# Patient Record
Sex: FEMALE | Race: WHITE | Employment: UNEMPLOYED | URBAN - METROPOLITAN AREA
[De-identification: names, ages, dates, MRNs, and addresses within clinical notes are randomized per-mention and may not be internally consistent; named-entity substitution may affect disease eponyms.]

---

## 2018-06-28 PROBLEM — N31.9 NEUROGENIC BLADDER: Status: ACTIVE | Noted: 2018-06-28

## 2018-06-28 PROBLEM — K31.84 GASTROPARESIS: Status: ACTIVE | Noted: 2018-06-28

## 2018-06-28 PROBLEM — Z87.440 HISTORY OF UTI: Status: ACTIVE | Noted: 2018-06-28

## 2018-06-28 PROBLEM — Q05.4 SPINA BIFIDA WITH HYDROCEPHALUS (HCC): Status: ACTIVE | Noted: 2018-06-28

## 2018-07-03 PROBLEM — Z78.9 SELF-CATHETERIZES URINARY BLADDER: Status: ACTIVE | Noted: 2018-07-03

## 2018-07-03 PROBLEM — N39.41 URGE INCONTINENCE: Status: ACTIVE | Noted: 2018-07-03

## 2018-07-03 PROBLEM — N39.0 RECURRENT UTI: Status: ACTIVE | Noted: 2018-07-03

## 2021-06-04 ENCOUNTER — OFFICE VISIT (OUTPATIENT)
Dept: ORTHOPEDIC SURGERY | Age: 56
End: 2021-06-04
Payer: MEDICARE

## 2021-06-04 VITALS — BODY MASS INDEX: 21.13 KG/M2 | HEIGHT: 65 IN

## 2021-06-04 DIAGNOSIS — Z96.642 HISTORY OF TOTAL LEFT HIP REPLACEMENT: ICD-10-CM

## 2021-06-04 DIAGNOSIS — M25.551 RIGHT HIP PAIN: Primary | ICD-10-CM

## 2021-06-04 DIAGNOSIS — M81.0 AGE-RELATED OSTEOPOROSIS WITHOUT CURRENT PATHOLOGICAL FRACTURE: ICD-10-CM

## 2021-06-04 PROCEDURE — 99214 OFFICE O/P EST MOD 30 MIN: CPT | Performed by: ORTHOPAEDIC SURGERY

## 2021-06-04 PROCEDURE — 3017F COLORECTAL CA SCREEN DOC REV: CPT | Performed by: ORTHOPAEDIC SURGERY

## 2021-06-04 PROCEDURE — 73502 X-RAY EXAM HIP UNI 2-3 VIEWS: CPT | Performed by: ORTHOPAEDIC SURGERY

## 2021-06-04 PROCEDURE — G8420 CALC BMI NORM PARAMETERS: HCPCS | Performed by: ORTHOPAEDIC SURGERY

## 2021-06-04 PROCEDURE — G8432 DEP SCR NOT DOC, RNG: HCPCS | Performed by: ORTHOPAEDIC SURGERY

## 2021-06-04 PROCEDURE — G8427 DOCREV CUR MEDS BY ELIG CLIN: HCPCS | Performed by: ORTHOPAEDIC SURGERY

## 2021-06-04 NOTE — PROGRESS NOTES
Patient: Wale Bobo                MRN: [id number]       SSN: [id number]  YOB: 1965        AGE: 54 y.o. SEX: female  Body mass index is 21.13 kg/m². PCP: Yvon Acosta MD  21    Ami Vasquez presents today with right-sided low back and right hip pain they have been  many specialists including Dr. Jenifer Marie and they are frustrated because of having trouble pinpointing the location of her pain that is aching she has spina bifida and stroke on the left side does a fair bit of time sitting and has had a bone scan which really did not show much had an MRI of the hip which did not show too much in an MRI of the back was thought of is nonsurgical and they report some cracking and crepitus at from time to time no fevers or chills no weight issues and. Left hip rotates normally she is had a hip replacement the right hip she has relatively free and easy range of motion but with internal rotation she gets pain that is in the abductor area the bursal area of the trochanter is nontender skin is normal the low back is only mildly tender again at over the ileocrest and its in the area of the abductors.     And the feet are noncontributory today    We did do x-rays 2021 AP pelvis AP lateral right hip shows just minimal degenerative changes the left hip is incompletely visualized which shows that she is had a greater trochanteric fracture and heterotopic ossification and quite significant osteoporosis as well    Bone scan did show a lot the only other thing I can think of is a labral tear but with atypical pain and symptoms we can do an MRI arthrogram of the hip there was a discussion regarding surgery I do not have a good surgical indication for her I do not doubt that she is having pain and I would recommend some physical therapy for her for her and therefore there especially with the pain in the abductor area they are going to see me back in a few weeks after the MRI arthrogram but is a pleasure to share in her care and she is a little bit difficult to diagnose just from the nature of the pain in the location thank you    REVIEW OF SYSTEMS:      CON: negative  EYE: negative   ENT: negative  RESP: negative  GI:    negative   :  negative  MSK: Positive  A twelve point review of systems was completed, positives noted and all other systems were reviewed and are negative          Past Medical History:   Diagnosis Date    Arthritis     Cerebral [facility] occlusion [facility] infarction [address] [address]     Constipation     Frequent UTI     Ill-defined condition     hydrocephalus    Ill-defined condition     tremors of right hand    Pituitary abnormality (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 No pertinent family history. Current Outpatient Medications   Medication Sig Dispense Refill    trospium (SANCTURA) 20 mg tablet       nitrofurantoin, macrocrystal-monohydrate, (MACROBID) 100 mg capsule TAKE ONE CAPSULE BY MOUTH EVERY NIGHT 30 Cap 5    omeprazole (PRILOSEC) 40 mg capsule       traZODone (DESYREL) 50 mg tablet       diclofenac (VOLTAREN) 1 % gel       acetaminophen (TYLENOL) 325 mg tablet Take 500 mg by mouth every four (4) hours as needed for Pain.  lamoTRIgine (LAMICTAL) 150 mg tablet Take 150 mg by mouth two (2) times a day. Indications: DEPRESSION ASSOCIATED WITH BIPOLAR DISORDER      bromocriptine (PARLODEL) 2.5 mg tablet Take 2.5 mg by mouth daily (after dinner).  gabapentin (NEURONTIN) 300 mg capsule Take 300 mg by mouth daily.  gabapentin (NEURONTIN) 600 mg tablet Take 1,800 mg by mouth nightly.  citalopram (CELEXA) 20 mg tablet Take 20 mg by mouth daily (with dinner).  cholecalciferol (VITAMIN D3) 1,000 unit tablet Take 1,000 Units by mouth daily.       cyanocobalamin (VITAMIN B-12) 1,000 mcg tablet Take 1,000 mcg by mouth daily.  ascorbic acid, vitamin C, (VITAMIN C) 500 mg tablet Take 500 mg by mouth daily.  ALPRAZolam (XANAX) 0.5 mg tablet  (Patient not taking: Reported on 6/4/2021)         Allergies   Allergen Reactions    Bupropion Hcl Seizures and Itching    Iodinated Contrast Media Unknown (comments)     As a child    Tramadol     Vicodin [Hydrocodone-Acetaminophen] Other (comments)       Past Surgical History:   Procedure Laterality Date    HX DILATION AND CURETTAGE      HX HIP REPLACEMENT      left     HX LAP CHOLECYSTECTOMY      HX ORTHOPAEDIC Left 2007    hip replacement    HX ORTHOPAEDIC Left 2011    hip revision    VASCULAR SURGERY PROCEDURE UNLIST      multiple shut surgerys       Social History     Socioeconomic History    Marital status:      Spouse name: Not on file    Number of children: Not on file    Years of education: Not on file    Highest education level: Not on file   Occupational History    Not on file   Tobacco Use    Smoking status: Never Smoker    Smokeless tobacco: Never Used   Substance and Sexual Activity    Alcohol use: No    Drug use: No    Sexual activity: Not on file   Other Topics Concern    Not on file   Social History Narrative    Not on file     Social Determinants of Health     Financial Resource Strain:     Difficulty of Paying Living Expenses:    Food Insecurity:     Worried About Running Out of Food in the Last Year:     Ran Out of Food in the Last Year:    Transportation Needs:     Lack of Transportation (Medical):      Lack of Transportation (Non-Medical):    Physical Activity:     Days of Exercise per Week:     Minutes of Exercise per Session:    Stress:     Feeling of Stress :    Social Connections:     Frequency of Communication with Friends and Family:     Frequency of Social Gatherings with Friends and Family:     Attends Hinduism Services:     Active Member of Clubs or Organizations:     Attends Club or Organization Meetings:     Marital Status:    Intimate Partner Violence:     Fear of Current or Ex-Partner:     Emotionally Abused:     Physically Abused:     Sexually Abused:        Visit Vitals  Ht 5' 5\" (1.651 m)   BMI 21.13 kg/m²         PHYSICAL EXAMINATION:  GENERAL: Alert and oriented x3, in no acute distress, well-developed, well-nourished, afebrile. HEART: No JVD. EYES: No scleral icterus   NECK: No significant lymphadenopathy   LUNGS: No respiratory compromise or indrawing  ABDOMEN: Soft, non-tender, non-distended. Electronically signed by:  Candice Enciso MD

## 2021-07-16 ENCOUNTER — OFFICE VISIT (OUTPATIENT)
Dept: ORTHOPEDIC SURGERY | Age: 56
End: 2021-07-16
Payer: MEDICARE

## 2021-07-16 VITALS — TEMPERATURE: 97.8 F | HEART RATE: 81 BPM | OXYGEN SATURATION: 97 %

## 2021-07-16 DIAGNOSIS — M70.61 GREATER TROCHANTERIC BURSITIS OF RIGHT HIP: ICD-10-CM

## 2021-07-16 DIAGNOSIS — M25.551 RIGHT HIP PAIN: Primary | ICD-10-CM

## 2021-07-16 PROCEDURE — G8427 DOCREV CUR MEDS BY ELIG CLIN: HCPCS | Performed by: ORTHOPAEDIC SURGERY

## 2021-07-16 PROCEDURE — 20610 DRAIN/INJ JOINT/BURSA W/O US: CPT | Performed by: ORTHOPAEDIC SURGERY

## 2021-07-16 PROCEDURE — G8432 DEP SCR NOT DOC, RNG: HCPCS | Performed by: ORTHOPAEDIC SURGERY

## 2021-07-16 PROCEDURE — 99214 OFFICE O/P EST MOD 30 MIN: CPT | Performed by: ORTHOPAEDIC SURGERY

## 2021-07-16 PROCEDURE — 3017F COLORECTAL CA SCREEN DOC REV: CPT | Performed by: ORTHOPAEDIC SURGERY

## 2021-07-16 PROCEDURE — G8420 CALC BMI NORM PARAMETERS: HCPCS | Performed by: ORTHOPAEDIC SURGERY

## 2021-07-16 RX ORDER — BETAMETHASONE SODIUM PHOSPHATE AND BETAMETHASONE ACETATE 3; 3 MG/ML; MG/ML
6 INJECTION, SUSPENSION INTRA-ARTICULAR; INTRALESIONAL; INTRAMUSCULAR; SOFT TISSUE ONCE
Status: COMPLETED | OUTPATIENT
Start: 2021-07-16 | End: 2021-07-16

## 2021-07-16 RX ADMIN — BETAMETHASONE SODIUM PHOSPHATE AND BETAMETHASONE ACETATE 6 MG: 3; 3 INJECTION, SUSPENSION INTRA-ARTICULAR; INTRALESIONAL; INTRAMUSCULAR; SOFT TISSUE at 16:20

## 2021-07-16 NOTE — PROGRESS NOTES
Patient: Nisha Balbuena                MRN: [id number]       SSN: [id number]  YOB: 1965        AGE: 54 y.o. SEX: female  There is no height or weight on file to calculate BMI.     PCP: Diandra Leach MD  07/16/21    Returns in follow-up with regards to right-sided hip pain I did order an MRI arthrogram because she was having groin discomfort not diagnosed on bone scan or plain MRI she canceled it and she had some questions about it she still is having some groin discomfort and night I cannot put a needle into the groin space in the office the exam today shows that she is got quite severe bursitis of the right hip her significant other would like her to have an injection and she is agreeable she had one on the left side of remotely    Examination today flexion adduction internal rotation recreate reproduces some groin discomfort although the motion is fairly reasonable and without a lot of discomfort and she is tender over the trochanter significantly    Previous x-rays do show mild arthritis involving the hip and the MRI does not really show a lot on plain technetium bone scan did not show much either    I recommend MRI arthrogram hopefully it is negative and under aseptic additions with timeout the right trochanteric bursa injected as per protocol thank you    REVIEW OF SYSTEMS:      CON: negative  EYE: negative   ENT: negative  RESP: negative  GI:    negative   :  negative  MSK: Positive  A twelve point review of systems was completed, positives noted and all other systems were reviewed and are negative          Past Medical History:   Diagnosis Date    Arthritis     [facility]erebral [facility] occlusion [facility] infarction [facility] [address]     Constipation     Frequent UTI     Ill-defined condition     hydrocephalus    Ill-defined condition     tremors of right hand    Pituitary abnormality ([address]     Pituitary mass ([address]     benign    Psychiatric disorder     depression    Right hip pain  Self-catheterizes urinary bladder     Stroke [facility]) 1972    left side paralysis       History reviewed. No pertinent family history. Current Outpatient Medications   Medication Sig Dispense Refill    trospium (SANCTURA) 20 mg tablet       nitrofurantoin, macrocrystal-monohydrate, (MACROBID) 100 mg capsule TAKE ONE CAPSULE BY MOUTH EVERY NIGHT 30 Cap 5    omeprazole (PRILOSEC) 40 mg capsule       traZODone (DESYREL) 50 mg tablet       diclofenac (VOLTAREN) 1 % gel       acetaminophen (TYLENOL) 325 mg tablet Take 500 mg by mouth every four (4) hours as needed for Pain.  lamoTRIgine (LAMICTAL) 150 mg tablet Take 150 mg by mouth two (2) times a day. Indications: DEPRESSION ASSOCIATED WITH BIPOLAR DISORDER      bromocriptine (PARLODEL) 2.5 mg tablet Take 2.5 mg by mouth daily (after dinner).  gabapentin (NEURONTIN) 300 mg capsule Take 300 mg by mouth daily.  gabapentin (NEURONTIN) 600 mg tablet Take 1,800 mg by mouth nightly.  citalopram (CELEXA) 20 mg tablet Take 20 mg by mouth daily (with dinner).  cholecalciferol (VITAMIN D3) 1,000 unit tablet Take 1,000 Units by mouth daily.  cyanocobalamin (VITAMIN B-12) 1,000 mcg tablet Take 1,000 mcg by mouth daily.  ascorbic acid, vitamin C, (VITAMIN C) 500 mg tablet Take 500 mg by mouth daily.       ALPRAZolam (XANAX) 0.5 mg tablet  (Patient not taking: Reported on 6/4/2021)         Allergies   Allergen Reactions    Bupropion Hcl Seizures and Itching    Iodinated Contrast Media Unknown (comments)     As a child    Tramadol     Vicodin [Hydrocodone-Acetaminophen] Other (comments)       Past Surgical History:   Procedure Laterality Date    HX DILATION AND CURETTAGE      HX HIP REPLACEMENT      left     HX LAP CHOLECYSTECTOMY      HX ORTHOPAEDIC Left 2007    hip replacement    HX ORTHOPAEDIC Left 2011    hip revision    VASCULAR SURGERY PROCEDURE UNLIST      multiple shut surgerys       Social History     Socioeconomic History    Marital status:      Spouse name: Not on file    Number of children: Not on file    Years of education: Not on file    Highest education level: Not on file   Occupational History    Not on file   Tobacco Use    Smoking status: Never Smoker    Smokeless tobacco: Never Used   Substance and Sexual Activity    Alcohol use: No    Drug use: No    Sexual activity: Not on file   Other Topics Concern    Not on file   Social History Narrative    Not on file     Social Determinants of Health     Financial Resource Strain:     Difficulty of Paying Living Expenses:    Food Insecurity:     Worried About Running Out of Food in the Last Year:     [address] in the Last Year:    Transportation Needs:     Lack of Transportation (Medical):  Lack of Transportation (Non-Medical):    Physical Activity:     Days of Exercise per Week:     Minutes of Exercise per Session:    Stress:     Feeling of Stress :    Social Connections:     Frequency of Communication with Friends and Family:     Frequency of Social Gatherings with Friends and Family:     Attends Yazidism Services:     Active Member of Clubs or Organizations:     Attends Club or Organization Meetings:     Marital Status:    Intimate Partner Violence:     Fear of Current or Ex-Partner:     Emotionally Abused:     Physically Abused:     Sexually Abused:        Visit Vitals  Pulse 81   Temp 97.8 °F (36.6 °C) (Skin)   SpO2 97%         PHYSICAL EXAMINATION:  GENERAL: Alert and oriented x3, in no acute distress, well-developed, well-nourished, afebrile. HEART: No JVD. EYES: No scleral icterus   NECK: No significant lymphadenopathy   LUNGS: No respiratory compromise or indrawing  ABDOMEN: Soft, non-tender, non-distended. Electronically signed by: MD JOEL Castillo, Naomie Street M.D., have reviewed the history, physical, and have updated the allergic reactions for Lala An.     TIME OUT performed immediately prior to the start of procedure:  Akash Higgins M.D., have performed the following reviews on Kristin Dimas prior to the start of the procedure:    - Patient was identified by name and date of birth  - Agreement on procedure being performed was verified  - Risks and benefits explained to the patient  - Patient was positioned for comfort  - Consent was signed and verified  - Patient was advised regarding risks of bruising, bleeding, infection and pain    Time: 4:15 PM     Body Part: intra-bursal injection of right hip. Medication and Dose: 1mL Celestone preparation, i.e. 6 mg, and 3 mL 1% lidocaine    Date of Procedure: 07/16/21    PROCEDURE PERFORMED BY : Larry Marrero M.D., [facility])    Provider Assisted by: Faustina Goodman    Patient assisted by: self    Patient tolerated procedure well with no complications

## 2021-08-31 DIAGNOSIS — M25.551 RIGHT HIP PAIN: ICD-10-CM

## 2021-09-03 ENCOUNTER — TELEPHONE (OUTPATIENT)
Dept: ORTHOPEDIC SURGERY | Age: 56
End: 2021-09-03

## 2021-09-03 NOTE — TELEPHONE ENCOUNTER
Patient called to schedule her MRI follow up appointment - MRI ws done at [facility] 2 weeks ago. She says they did not provide her a disc and results are not showing in system. Are we able to see these results and schedule her follow up? Please advise PAC.

## 2021-09-07 DIAGNOSIS — M25.551 RIGHT HIP PAIN: ICD-10-CM

## 2021-09-07 PROCEDURE — 27093 INJECTION FOR HIP X-RAY: CPT | Performed by: ORTHOPAEDIC SURGERY

## 2021-09-10 ENCOUNTER — OFFICE VISIT (OUTPATIENT)
Dept: ORTHOPEDIC SURGERY | Age: 56
End: 2021-09-10
Payer: MEDICARE

## 2021-09-10 VITALS
TEMPERATURE: 98 F | OXYGEN SATURATION: 98 % | HEIGHT: 65 IN | BODY MASS INDEX: 23.32 KG/M2 | HEART RATE: 83 BPM | WEIGHT: 140 LBS

## 2021-09-10 DIAGNOSIS — Z96.642 HISTORY OF TOTAL LEFT HIP REPLACEMENT: Primary | ICD-10-CM

## 2021-09-10 DIAGNOSIS — M70.61 GREATER TROCHANTERIC BURSITIS OF RIGHT HIP: ICD-10-CM

## 2021-09-10 DIAGNOSIS — M25.551 RIGHT HIP PAIN: ICD-10-CM

## 2021-09-10 PROCEDURE — 99214 OFFICE O/P EST MOD 30 MIN: CPT | Performed by: PHYSICIAN ASSISTANT

## 2021-09-10 PROCEDURE — G8432 DEP SCR NOT DOC, RNG: HCPCS | Performed by: PHYSICIAN ASSISTANT

## 2021-09-10 PROCEDURE — 3017F COLORECTAL CA SCREEN DOC REV: CPT | Performed by: PHYSICIAN ASSISTANT

## 2021-09-10 PROCEDURE — G8420 CALC BMI NORM PARAMETERS: HCPCS | Performed by: PHYSICIAN ASSISTANT

## 2021-09-10 PROCEDURE — G8427 DOCREV CUR MEDS BY ELIG CLIN: HCPCS | Performed by: PHYSICIAN ASSISTANT

## 2021-09-10 NOTE — PROGRESS NOTES
[address] [place] [address]  [phone]           Patient: Vanessa Duran                MRN: [id number]       SSN: [id number]  YOB: 1965        AGE: 54 y.o. SEX: female  Body mass index is 23.3 kg/m². PCP: Yanira Ramos MD  09/10/21      This office note has been dictated. REVIEW OF SYSTEMS:  Constitutional: Negative for fever, chills, weight loss and malaise/fatigue. HENT: Negative. Eyes: Negative. Respiratory: Negative. Cardiovascular: Negative. Gastrointestinal: No bowel incontinence or constipation. Genitourinary: No bladder incontinence or saddle anesthesia. Skin: Negative. Neurological: Negative. Endo/Heme/Allergies: Negative. Psychiatric/Behavioral: Negative. Musculoskeletal: As per HPI above. Past Medical History:   Diagnosis Date    Arthritis     Cerebral artery occlusion with cerebral infarction ([address]     Constipation     Frequent UTI     Ill-defined condition     hydrocephalus    Ill-defined condition     tremors of right hand    Pituitary abnormality (HCC)     Pituitary mass (HCC)     benign    Psychiatric disorder     depression    Right hip pain     Self-catheterizes urinary bladder     Stroke ([address] 1972    left side paralysis         Current Outpatient Medications:     trospium (SANCTURA) 20 mg tablet, , Disp: , Rfl:     nitrofurantoin, macrocrystal-monohydrate, (MACROBID) 100 mg capsule, TAKE ONE CAPSULE BY MOUTH EVERY NIGHT, Disp: 30 Cap, Rfl: 5    omeprazole (PRILOSEC) 40 mg capsule, , Disp: , Rfl:     traZODone (DESYREL) 50 mg tablet, , Disp: , Rfl:     diclofenac (VOLTAREN) 1 % gel, , Disp: , Rfl:     acetaminophen (TYLENOL) 325 mg tablet, Take 500 mg by mouth every four (4) hours as needed for Pain., Disp: , Rfl:     lamoTRIgine (LAMICTAL) 150 mg tablet, Take 150 mg by mouth two (2) times a day.  Indications: DEPRESSION ASSOCIATED WITH BIPOLAR DISORDER, Disp: , Rfl:     bromocriptine (PARLODEL) 2.5 mg tablet, Take 2.5 mg by mouth daily (after dinner). , Disp: , Rfl:     gabapentin (NEURONTIN) 300 mg capsule, Take 300 mg by mouth daily. , Disp: , Rfl:     gabapentin (NEURONTIN) 600 mg tablet, Take 1,800 mg by mouth nightly., Disp: , Rfl:     citalopram (CELEXA) 20 mg tablet, Take 20 mg by mouth daily (with dinner). , Disp: , Rfl:     cholecalciferol (VITAMIN D3) 1,000 unit tablet, Take 1,000 Units by mouth daily. , Disp: , Rfl:     cyanocobalamin (VITAMIN B-12) 1,000 mcg tablet, Take 1,000 mcg by mouth daily. , Disp: , Rfl:     ascorbic acid, vitamin C, (VITAMIN C) 500 mg tablet, Take 500 mg by mouth daily. , Disp: , Rfl:     ALPRAZolam (XANAX) 0.5 mg tablet, , Disp: , Rfl:     Allergies   Allergen Reactions    Bupropion Hcl Seizures and Itching    Iodinated Contrast Media Unknown (comments)     As a child    Tramadol     Vicodin [Hydrocodone-Acetaminophen] Other (comments)       Social History     Socioeconomic History    Marital status:      Spouse name: Not on file    Number of children: Not on file    Years of education: Not on file    Highest education level: Not on file   Occupational History    Not on file   Tobacco Use    Smoking status: Never Smoker    Smokeless tobacco: Never Used   Substance and Sexual Activity    Alcohol use: No    Drug use: No    Sexual activity: Not on file   Other Topics Concern    Not on file   Social History Narrative    Not on file     Social Determinants of Health     Financial Resource Strain:     Difficulty of Paying Living Expenses:    Food Insecurity:     Worried About Running Out of Food in the Last Year:     Ran Out of Food in the Last Year:    Transportation Needs:     Lack of Transportation (Medical):      Lack of Transportation (Non-Medical):    Physical Activity:     Days of Exercise per Week:     Minutes of Exercise per Session:    Stress:     Feeling of Stress :    Social Connections:     Frequency of Communication with Friends and Family:     Frequency of Social Gatherings with Friends and Family:     Attends Hindu Services:     Active Member of Clubs or Organizations:     Attends Club or Organization Meetings:     Marital Status:    Intimate Partner Violence:     Fear of Current or Ex-Partner:     Emotionally Abused:     Physically Abused:     Sexually Abused:        Past Surgical History:   Procedure Laterality Date    HX DILATION AND CURETTAGE      HX HIP REPLACEMENT      left     HX LAP CHOLECYSTECTOMY      HX ORTHOPAEDIC Left 2007    hip replacement    HX ORTHOPAEDIC Left 2011    hip revision    VASCULAR SURGERY PROCEDURE UNLIST      multiple shut surgerys         Patient seen evaluate today for her right hip. She is seen by Dr. Shamar Garcia at which point she had a cortisone injection for bursitis which did not seem to help her much. She was sent for an arthrogram MRI of her hip to rule out any labral pathology. She has today for review. She does continue to have laterally based discomfort of her right hip. Denies any groin pain or thigh pain. She has no radiating pain down the lower extremity. Patient denies recent fevers, chills, chest pain, SOB, or injuries. No recent systemic changes noted. A 12-point review of systems is performed today. Pertinent positives are noted. All other systems reviewed and otherwise are negative. Physical exam: General: Alert and oriented x3, nad.  well-developed, well nourished. normal affect, AF. NC/AT, EOMI, neck supple, trachea midline, no JVD present. Breathing is non-labored. Examination of lower extremities reveals pretty well-maintained range of motion of the hips. Does have a little laterally based discomfort with rotation. She does have discomfort with patient the trochanter bursa. Negative straight leg raise. Negative calf tenderness. Negative Homans. No signs of DVT present.     Review of MRI right hip:  No evidence for displaced labral tear. There is fluid adjacent to the IT tract to the level of the right greater trochanteric region. Assessment: Right hip trochanter bursitis, IT band syndrome    Plan: At this point, we discussed treatment options. She declines a repeat cortisone injection today. She will continue on her Motrin over-the-counter as directed. We will get her into physical therapy for IT band stretching, iontophoresis, ultrasound. We will see her back in about 6 weeks time for evaluation and consider repeat injection for the hip at that point.             JR Jonathan DECKER, PA-C, ATC

## 2021-09-28 ENCOUNTER — HOSPITAL ENCOUNTER (OUTPATIENT)
Dept: PHYSICAL THERAPY | Age: 56
Discharge: HOME | End: 2021-09-28
Attending: PHYSICIAN ASSISTANT
Payer: MEDICARE

## 2021-09-28 DIAGNOSIS — M70.61 GREATER TROCHANTERIC BURSITIS OF RIGHT HIP: ICD-10-CM

## 2021-09-28 PROCEDURE — 97163 PT EVAL HIGH COMPLEX 45 MIN: CPT

## 2021-09-28 PROCEDURE — 97530 THERAPEUTIC ACTIVITIES: CPT

## 2021-09-28 NOTE — PROGRESS NOTES
PT DAILY TREATMENT NOTE/HIP [id number]    Patient Name: Pamela Fulton  Date:2021  : 1965  [x]  Patient  Verified  Payor: Lida Concepcion / Plan: [address]  HMO / Product Type: Managed Care Medicare /    In time:4:45  Out time:5:20  Total Treatment Time (min): 35  Visit #: 1 of 16    Medicare/BCBS Only   Total Timed Codes (min):  10 1:1 Treatment Time:  35     Treatment Area: Greater trochanteric bursitis of right hip [M70.61]    SUBJECTIVE  Pain Level (0-10 scale): 9  [x]constant []intermittent []improving []worsening []no change since onset    Any medication changes, allergies to medications, adverse drug reactions, diagnosis change, or new procedure performed?: [x] No    [] Yes (see summary sheet for update)  Subjective functional status/changes:     PLOF: walking with SPC around home with using manual wc at times and transport chair out of the house, only falling every once in a while due to LOB not everyday with right LE giving out, cross stitch, TV crossword puzzles  Limitations to PLOF: unable to walk due to pain in right hip   Mechanism of Injury: 2021 with insidious onset, sitting in chair and felt bone cracking with pain since   Current symptoms/Complaints: MRI showing bursitis in right hip. Falling daily with going transfers to and from chair versus couple times a month prior. Uses SPC during the day for walking and WC at night with use of transfer chair when out.  helping with transfers and to complete all other daily activires with mod I. Pain in right lateral hip describes as achy. Deniues radicaulr symptoms. Pain increases with standing, walking to 10/10. Pain decreases to 7/10 with rest and ibuprofen. Pt reporting right LE does give out at times. Prior to hip hurting was able to walk around home with [facility].  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 \"    OBJECTIVE/EXAMINATION      25 min [x]Eval                  []Re-Eval        10 min Therapeutic Activity:  []  See flow sheet : pt education on exam findings POC, sitting positioning with increasing WB on left side, leaning forward with STS   Rationale: increase ROM and increase strength  to improve the patients ability to perform daily activities with decreased pain and symptom levels       With   [] TE   [] TA   [] neuro   [] other: Patient Education: [x] Review HEP    [] Progressed/Changed HEP based on:   [] positioning   [] body mechanics   [] transfers   [] heat/ice application    [] other:      Other Objective/Functional Measures:  Gait:  [] Normal    [] Abnormal    [] Antalgic    [] NWB    Device:    Describe: pt arrived in clinic with transport chair with flexor tone noted in UE with wearing bilateral AFO         ROM/Strength        Strength (1-5)                    PROM    AROM  Hip Left Right Left Right Left Right   Flexion 2 3   *   Extension         Abduction Seated 3 5   NT 22*   Adduction Seated 3 5       ER     NT 10*   IR     NT 20*   Knee Left Right Left Right Left Right   Extension 5 5       Flexion Unable  4+       Ankle      Left Right    DF NT  NT       PF NT NT                                         Palpation  [] Min  [x] Mod  [] Severe    Location: right greater trochanter  [] Min  [] Mod  [] Severe    Location:  [] Min  [] Mod  [] Severe    Location:    Optional Tests  MARIA DEL ROSARIO             [] Neg    [x] Pos  FADDIR   [] Neg    [x] Pos  Vincent Test:  [] Neg    [] Pos  Scouring Test:             [] Neg    [] Pos  Trendelenberg:  [] Neg    [] Pos [] Left    [] Right  OberTest:   [] Neg    [] Pos  Ely's Test:  [] Neg    [] Pos  Piriformis Test:  [] Neg [] Pos    Functional Leg Length (cm):   Right:  Left:  Discrepancy:    Other tests/ comments:  5x sit to stand 25.5sec without UE assist however CGA from 22in with cues to increased anterior weight shift, poor WB on left LE and use of table to assist  Unable to lay right hip on table in supine  Increased WB on right hip in sitting with right hip hike noted  Spastic left UE and LE with unable to assess left LE ROM and strength due to decreased coordination   present during entire evaluation      Pain Level (0-10 scale) post treatment: 9    ASSESSMENT/Changes in Function: Pt is a 49yo female presenting to therapy with c/c right lateral hip pain with insidious onset starting in Jan 2021. Pt with hx of spina bifida, CVA with left UE and LE affected with flexor tone noted, and left RENATO with revision 2011. Pt entered clinic in transport chair with  who was present during eval. Prior to right hip pain pt was able to walk around home during day with [facility] with using manual wc with fatigue and able to walk in grocery stores with holding onto cart and only falling couple times in month time span. Pt now unable to walk or stand due to right hip pain with reporting daily falls due to right LE giving way with SPT with needing increased assistance from  for transfers. Pt had MRI completed showing right hip bursitis. Pt describing pain as achy in right lateral hip with denying radicular symptoms at this time. Pain increases to 10/10 with walking, standing.  Pain decreases to 7/10 with ibuprofen and rest. Pt demonstrates decreased right LE strength with inability to fully assess left LE due to tone, decreased right hip ROM with unable to lay flat on table with right hip hike noted in supine and sitting, max A for left LE on/off mat, min A for SPT mat<> transport chair, poor sit to stand technique with increased posterior lean, decreased WB left LE and using table for LE to push off with completing 5x sit to stand from 22in with CGA without UE assist in 25.5sec, positive MARIA DEL ROSARIO, FADIR on right, and TTP over right greater trochanter. Signs and symptoms consistent with right hip bursitis possibly due to poor lumbo pelvic mechanics and increased right LE reliance with transfers and ADLs. . Pt would benefit from skilled PT services to address the above impairments to allow pt to complete ADLs with increased ease. Patient will continue to benefit from skilled PT services to modify and progress therapeutic interventions, address functional mobility deficits, address ROM deficits, address strength deficits, analyze and cue movement patterns, analyze and modify body mechanics/ergonomics, assess and modify postural abnormalities, address imbalance/dizziness and instruct in home and community integration to attain remaining goals. []  See Plan of Care  []  See progress note/recertification  []  See Discharge Summary         Progress towards goals / Updated goals:  Short Term Goals: STG- To be accomplished in 4 week(s):  1. Pt will be independent with HEP to encourage prophylaxis. Eval: HEP dispensed     2. Pt will be able to stand for > 10 minutes at home with pain </=5/10 in right hip to demonstrate improved loading on right LE  Eval: currently unable to stand due to right hip pain    Long Term Goals: LTG- To be accomplished in 8 week(s):  1. Pt will report 1 or no falls per week to indicate improved safety with SPT transfers and return to PLOF  Eval: falling everyday with SPT    2. Pt will be able to complete 5x sit to stand from 17in in < 20 seconds with demonstrating safe technique to indicate improved functional LE strength to decrease burden with transfers at home  Eval:25.5sec from 22in without UE assist, cues to lean forward, decreased WB on left LE, pushing off on back of table    3.   Pt will be able to walk 50ft with SPC with pain </=2/10 in right hip to be able to ambulate short distances in home with increased ease  Eval:unable to walk due to pain in right hip, currenlty using manual wc or transport chair to get around      4. Pt FOTO score will increase by >/= 18 points to show improvement in subjective function.   Eval:28  Current: will address at visit 5  *FOTO score is an established functional score where 100 = no disability*      PLAN  []  Upgrade activities as tolerated     [x]  Continue plan of care  [x]  Update interventions per flow sheet       []  Discharge due to:_  []  Other:_      Philip Eid 9/28/2021  4:40 PM

## 2021-09-29 NOTE — PROGRESS NOTES
[facility] at the [address], [place], [address] [facility]  Phone: [phone]      Fax:  [phone]             Plan of Care/ Statement of Necessity for Physical Therapy Services      Patient name: Hua Stevenson Start of Care: 21   Referral source: Kelli Lundborg : 1965    Medical Diagnosis: Greater trochanteric bursitis of right hip [M70.61]   Onset Date:2021    Treatment Diagnosis: right hip pain    Prior Hospitalization: see medical history Provider#: [id number]   Medications: Verified on Patient summary List    Comorbidities:  CVA 7 years ago with affecting left side, spinia bifidia, depression, left RENATO  with revision , multiple shunt sx   Prior Level of Function: walking with SPC around home with using manual wc at times and transport chair out of the house, only falling every once in a while not everyday, mod I with ADLs,  not having to assist with transfers      The Plan of Care and following information is based on the information from the initial evaluation. Assessment/ key information: Pt is a 51yo female presenting to therapy with c/c right lateral hip pain with insidious onset starting in 2021. Pt with hx of spina bifida, CVA with left UE and LE affected with flexor tone noted, and left RENATO with revision . Pt entered clinic in transport chair with  who was present during eval. Prior to right hip pain pt was able to walk around home during day with [facility] with using manual wc with fatigue and able to walk in grocery stores with holding onto cart and only falling couple times in month time span. Pt now unable to walk or stand due to right hip pain with reporting daily falls due to right LE giving way with SPT with needing increased assistance from  for transfers. Pt had MRI completed showing right hip bursitis.  Pt describing pain as achy in right lateral hip with denying radicular symptoms at this time. Pain increases to 10/10 with walking, standing. Pain decreases to 7/10 with ibuprofen and rest. Pt demonstrates decreased right LE strength with inability to fully assess left LE due to tone, decreased right hip ROM with unable to lay flat on table with right hip hike noted in supine and sitting, max A for left LE on/off mat, min A for SPT mat<> transport chair, poor sit to stand technique with increased posterior lean, decreased WB left LE and using table for LE to push off with completing 5x sit to stand from 22in with CGA without UE assist in 25.5sec, positive MARIA DEL ROSARIO, FADIR on right, and TTP over right greater trochanter. Signs and symptoms consistent with right hip bursitis possibly due to poor lumbo pelvic mechanics and increased right LE reliance with transfers and ADLs. . Pt would benefit from skilled PT services to address the above impairments to allow pt to complete ADLs with increased ease.         Evaluation Complexity History HIGH Complexity :3+ comorbidities / personal factors will impact the outcome/ POC ; Examination HIGH Complexity : 4+ Standardized tests and measures addressing body structure, function, activity limitation and / or participation in recreation  ;Presentation HIGH Complexity : Unstable and unpredictable characteristics  ; Clinical Decision Making MEDIUM Complexity : FOTO score of 26-74 FOTO score = an established functional score where 100 = no disability  Overall Complexity Rating: HIGH   Problem List: pain affecting function, decrease ROM, decrease strength, impaired gait/ balance, decrease ADL/ functional abilitiies, decrease activity tolerance, decrease flexibility/ joint mobility and decrease transfer abilities   Treatment Plan may include any combination of the following: Therapeutic exercise, Therapeutic activities, Neuromuscular re-education, Physical agent/modality, Gait/balance training, Manual therapy, Patient education, Self Care training, Functional mobility training, Home safety training and Stair training  Patient / Family readiness to learn indicated by: asking questions, trying to perform skills and interest  Persons(s) to be included in education: patient (P) and family support person (FSP);list   Barriers to Learning/Limitations: None  Patient Goal (s): be able to walk without falling\"  Patient Self Reported Health Status: fair  Rehabilitation Potential: good    Short Term Goals: STG- To be accomplished in 4 week(s):  1. Pt will be independent with HEP to encourage prophylaxis. Eval: HEP dispensed      2. Pt will be able to stand for > 10 minutes at home with pain </=5/10 in right hip to demonstrate improved loading on right LE  Eval: currently unable to stand due to right hip pain     Long Term Goals: LTG- To be accomplished in 8 week(s):  1. Pt will report 1 or no falls per week to indicate improved safety with SPT transfers and return to PLOF  Eval: falling everyday with SPT     2.  Pt will be able to complete 5x sit to stand from 17in in < 20 seconds with demonstrating safe technique to indicate improved functional LE strength to decrease burden with transfers at home  Eval:25.5sec from 22in without UE assist, cues to lean forward, decreased WB on left LE, pushing off on back of table     3. Pt will be able to walk 50ft with SPC with pain </=2/10 in right hip to be able to ambulate short distances in home with increased ease  Eval:unable to walk due to pain in right hip, currenlty using manual wc or transport chair to get around       4. Pt FOTO score will increase by >/= 18 points to show improvement in subjective function. Eval:28  Current: will address at visit 5  *FOTO score is an established functional score where 100 = no disability*      Frequency / Duration: Patient to be seen 2 times per week for 8 weeks.     Patient/ Caregiver education and instruction: Diagnosis, prognosis, self care, activity modification and exercises   [x]  Plan of care has been reviewed with PTA    Certification Period: 9/28/21 - 12/27/21  Roberto [facility] 9/29/2021 2:55 PM  _____________________________________________________________________  I certify that the above Therapy Services are being furnished while the patient is under my care. I agree with the treatment plan and certify that this therapy is necessary.     Physician's Signature:____________Date:_________TIME:________                                      Rogelio Mosley PA-C    ** Signature, Date and Time must be completed for valid certification **    Please sign and return to [facility] at the [place] [address], [place], [address] [facility]       Phone: [phone]      Fax:  [phone]

## 2021-10-05 ENCOUNTER — HOSPITAL ENCOUNTER (OUTPATIENT)
Dept: PHYSICAL THERAPY | Age: 56
Discharge: HOME | End: 2021-10-05
Attending: PHYSICIAN ASSISTANT
Payer: MEDICARE

## 2021-10-05 PROCEDURE — 97530 THERAPEUTIC ACTIVITIES: CPT

## 2021-10-05 PROCEDURE — 97110 THERAPEUTIC EXERCISES: CPT

## 2021-10-05 NOTE — PROGRESS NOTES
PT DAILY TREATMENT NOTE    Patient Name: Pamela Fulton  Date:10/5/2021  : 1965  [x]  Patient  Verified  Payor: Lida Concepcion / Plan: [facility] HMO / Product Type: Managed Care Medicare /    In time:5:30  Out time:6:10  Total Treatment Time (min): 40  Total Timed Codes (min): 40  1:1 Treatment Time (MC/BCBS only): 40   Visit #: 2 of 16    Treatment Dx: No admission diagnoses are documented for this encounter. SUBJECTIVE  Pain Level (0-10 scale): 10  Any medication changes, allergies to medications, adverse drug reactions, diagnosis change, or new procedure performed?: [x] No    [] Yes (see summary sheet for update)  Subjective functional status/changes:   [] No changes reported  \"It's a 10 today. \" Pt reporting only 1 fall since eval when sitting on toilet and sliding forward.  reporting able to stand up better in hotel due to carpet versus hardwood floors.      OBJECTIVE    30 min Therapeutic Exercise:  [x] See flow sheet :   Rationale: increase ROM and increase strength to improve the patients ability to perform daily activities with decreased pain and symptom levels       10 min Therapeutic Activity:  [x]  See flow sheet :   Rationale: increase ROM, increase strength, improve coordination and increase proprioception  to improve the patients ability to perform daily activities with decreased pain and symptom levels       With   [] TE   [] TA   [] neuro   [] other: Patient Education: [x] Review HEP    [] Progressed/Changed HEP based on:   [] positioning   [] body mechanics   [] transfers   [] heat/ice application    [] other:      Other Objective/Functional Measures:   CGA with sit to stands with cues to lean forward, manual assist at left LE to increase WB  Right pelvis unable to lay on table in supine  CGA to min A for SPT wc<>Mat  Max A left LE on/off mat    Pain Level (0-10 scale) post treatment: 6    ASSESSMENT/Changes in Function: Pt arrived 15min late to session. Pt accompanied by  during session. Pt tolerated session well with reporting decreased right hip pain post session. Good response to hip shift and trunk rotation in sitting and supine. CGA to min A still needed with transfers however much improvement with cues to lean forward with sit to stands. Pt education to increased some WB through left LE in standing due to tone to help off load right LE. Patient will continue to benefit from skilled PT services to modify and progress therapeutic interventions, address functional mobility deficits, address ROM deficits, address strength deficits, analyze and cue movement patterns, analyze and modify body mechanics/ergonomics, assess and modify postural abnormalities and instruct in home and community integration to attain remaining goals. [x]  See Plan of Care  []  See progress note/recertification  []  See Discharge Summary         Progress towards goals / Updated goals:  Short Term Goals: STG- To be accomplished in 4 week(s):  1.  Pt will be independent with HEP to encourage prophylaxis. Eval: HEP dispensed   Current: compliance, updated today progressing 10/5/21     2.  Pt will be able to stand for > 10 minutes at home with pain </=5/10 in right hip to demonstrate improved loading on right LE  Eval: currently unable to stand due to right hip pain     Long Term Goals: LTG- To be accomplished in 8 week(s):  1.  Pt will report 1 or no falls per week to indicate improved safety with SPT transfers and return to PLOF  Eval: falling everyday with SPT  Current: 1 fall since eval progressing 10/5/21     2.  Pt will be able to complete 5x sit to stand from 17in in < 20 seconds with demonstrating safe technique to indicate improved functional LE strength to decrease burden with transfers at home  Eval:25.5sec from 22in without UE assist, cues to lean forward, decreased WB on left LE, pushing off on back of table     3.  Pt will be able to walk 50ft with [facility] with pain </=2/10 in right hip to be able to ambulate short distances in home with increased ease  Eval:unable to walk due to pain in right hip, currenlty using manual wc or transport chair to get around       4.  Pt FOTO score will increase by >/= 18 points to show improvement in subjective function.   Eval:28  Current: will address at visit 5  *FOTO score is an established functional score where 100 = no disability*    PLAN  []  Upgrade activities as tolerated     [x]  Continue plan of care  []  Update interventions per flow sheet       []  Discharge due to:_  []  Other:_      Conchita Calle 10/5/2021  5:26 PM    Future Appointments   Date Time Provider [place]   10/7/2021  2:15 PM Giacomo Dawkins PT [facility]   10/12/2021  5:15 PM Conchita Calle [facility]   10/18/2021  3:00 PM See Mcgee PT, DPT [facility]   10/20/2021  3:45 PM Giacomo Dawkins PT [facility]   10/26/2021  5:15 PM Conchita Calle [facility]   11/2/2021  4:30 PM See Mcgee PT, DPT [facility]   11/4/2021  3:00 PM Casey Osullivan PA-C [facility]   11/4/2021  4:30 PM See Mcgee PT, DPT [facility]

## 2021-10-07 ENCOUNTER — HOSPITAL ENCOUNTER (OUTPATIENT)
Dept: PHYSICAL THERAPY | Age: 56
Discharge: HOME | End: 2021-10-07
Attending: PHYSICIAN ASSISTANT
Payer: MEDICARE

## 2021-10-07 PROCEDURE — 97110 THERAPEUTIC EXERCISES: CPT

## 2021-10-07 PROCEDURE — 97112 NEUROMUSCULAR REEDUCATION: CPT

## 2021-10-07 PROCEDURE — 97530 THERAPEUTIC ACTIVITIES: CPT

## 2021-10-07 NOTE — PROGRESS NOTES
PT DAILY TREATMENT NOTE    Patient Name: Nam Hatfield  Date:10/7/2021  : 1965  [x]  Patient  Verified  Payor: Feli Kumar / Plan: [place] / Product Type: Medicare /    In time:220  Out time:302  Total Treatment Time (min): 42  Total Timed Codes (min): 42  1:1 Treatment Time (MC/BCBS only): 42   Visit #: 3 of 16    Treatment Dx: Trochanteric bursitis, right hip [M70.61]    SUBJECTIVE  Pain Level (0-10 scale): 8 right hip  Any medication changes, allergies to medications, adverse drug reactions, diagnosis change, or new procedure performed?: [x] No    [] Yes (see summary sheet for update)  Subjective functional status/changes:   [] No changes reported  Reports falling yesterday however no pain due to fall.     OBJECTIVE          12 min Therapeutic Exercise:  [x] See flow sheet :ROM in supine both hips, LTR   Rationale: increase ROM, increase strength and improve coordination to improve the patients ability to perform ADL    15 min Therapeutic Activity:  [x]  See flow sheet :worked on supine and seated scooting for increased ease with all transfers, requires manual assist/facilitation, seated scooting lateral and fwd, STS transfers with min assist,    Rationale: increase strength, improve coordination and motor control  to improve the patients ability to perform transfers with more ease     15 min Neuromuscular Re-education:  [x]  See flow sheet :facilitation of alternating trunk rotation in seated position, left scapula/shoulder PNF in S/L with facilitation of contralateral hip re/protraction, WS activities in standing   Rationale: increase ROM, increase strength, improve coordination, improve balance and increase proprioception  to improve the patients ability to perform ADL              With   [x] TE   [] TA   [] neuro   [] other: Patient Education: [x] Review HEP    [] Progressed/Changed HEP based on:   [] positioning   [] body mechanics   [] transfers   [] heat/ice application    [] other: Other Objective/Functional Measures:   -patient unable to initiate PPT, marked ES tension/tone  -challenged with scooting activities  -marked retraction of left shoulder and hip in seated position at beginning of session  -discussed possible benefit from platform walker for increased stability with ambulation, patient will bring in walker she has been using previously     Pain Level (0-10 scale) post treatment: 7    ASSESSMENT/Changes in Function: patient motivated and reports reduction in pain with more symmetrical standing and extended right hip, improvement in sitting and standing symmetry/alignment post session    Patient will continue to benefit from skilled PT services to address ROM deficits, address strength deficits, analyze and address soft tissue restrictions, analyze and cue movement patterns and assess and modify postural abnormalities to attain remaining goals. [x]  See Plan of Care  []  See progress note/recertification  []  See Discharge Summary         Progress towards goals / Updated goals:  Short Term Goals: STG- To be accomplished in 4 week(s):  1.  Pt will be independent with HEP to encourage prophylaxis. Eval: HEP dispensed   Current: compliance, updated today progressing 10/5/21     2.  Pt will be able to stand for > 10 minutes at home with pain </=5/10 in right hip to demonstrate improved loading on right LE  Eval: currently unable to stand due to right hip pain  Current status 10/7/21: improved standing tolerance and lateral WS with reports of reduced pain to 5/10,progressing     Long Term Goals: LTG- To be accomplished in 8 week(s):  1.  Pt will report 1 or no falls per week to indicate improved safety with SPT transfers and return to PLOF  Eval: falling everyday with SPT  Current: 1 fall since eval progressing 10/5/21  Current status 10/7/21: one fall on 10/6/21, no reports of pain due to fall     2.  Pt will be able to complete 5x sit to stand from 17in in < 20 seconds with demonstrating safe technique to indicate improved functional LE strength to decrease burden with transfers at home  Eval:25.5sec from 22in without UE assist, cues to lean forward, decreased WB on left LE, pushing off on back of table     3.  Pt will be able to walk 50ft with SPC with pain </=2/10 in right hip to be able to ambulate short distances in home with increased ease  Eval:unable to walk due to pain in right hip, currenlty using manual wc or transport chair to get around       4.  Pt FOTO score will increase by >/= 18 points to show improvement in subjective function.   Eval:28  Current: will address at visit 5  *FOTO score is an established functional score where 100 = no disability*       PLAN  []  Upgrade activities as tolerated     [x]  Continue plan of care  []  Update interventions per flow sheet       []  Discharge due to:_  []  Other:_      Karen Nicole, PT 10/7/2021  2:33 PM    Future Appointments   Date Time Provider Marty Jj   10/12/2021  5:15 PM Royal Sujey Calle [facility]   10/18/2021  3:00 PM Kelin Johns PT, DPT [facility] THE [facility]   10/20/2021  3:45 PM Dean Singh PT [facility]PT[facility]   10/26/2021  5:15 PM Royal Sujey Calle [facility]PT[facility]   11/2/2021  4:30 PM Kelin Johns PT, DPT [facility] THE [facility]   11/4/2021  3:00 PM Tammy Dangelo PA-C [facility]   11/4/2021  4:30 PM Kelin Johns PT, DPT [facility] THE [facility]

## 2021-10-12 ENCOUNTER — HOSPITAL ENCOUNTER (OUTPATIENT)
Dept: PHYSICAL THERAPY | Age: 56
Discharge: HOME | End: 2021-10-12
Attending: PHYSICIAN ASSISTANT
Payer: MEDICARE

## 2021-10-12 PROCEDURE — 97530 THERAPEUTIC ACTIVITIES: CPT

## 2021-10-12 PROCEDURE — 97112 NEUROMUSCULAR REEDUCATION: CPT

## 2021-10-12 PROCEDURE — 97116 GAIT TRAINING THERAPY: CPT

## 2021-10-12 NOTE — PROGRESS NOTES
PT DAILY TREATMENT NOTE    Patient Name: Lisa Palomares  Date:10/12/2021  : 1965  [x]  Patient  Verified  Payor: Parveen Foote / Plan: [address] [facility] HMO / Product Type: Managed Care Medicare /    In time:5:15  Out time:6:03  Total Treatment Time (min): 48  Total Timed Codes (min): 48  1:1 Treatment Time (MC/BCBS only): 48   Visit #: 4 of 16    Treatment Dx: Trochanteric bursitis, right hip [M70.61]    SUBJECTIVE  Pain Level (0-10 scale): 8  Any medication changes, allergies to medications, adverse drug reactions, diagnosis change, or new procedure performed?: [x] No    [] Yes (see summary sheet for update)  Subjective functional status/changes:   [] No changes reported  Pt reporting no falls since last visit however an almost fall in the bathroom with turning. OBJECTIVE    20 min Therapeutic Activity:  [x]  See flow sheet :   Rationale: increase strength, improve coordination and increase proprioception  to improve the patients ability to perform daily activities with decreased pain and symptom levels     20 min Neuromuscular Re-education:  [x]  See flow sheet :   Rationale: increase strength, improve coordination, improve balance and increase proprioception  to improve the patients ability to perform daily activities with decreased pain and symptom levels    8 min Gait Training: In parallel bars with holding on with right UE with cues to increase anterior lean and step with left LE after moving right LE forward   Rationale: To improve ambulation safety and efficiency in order to improve patient's ability to safely ambulate at home for self care.            With   [] TE   [] TA   [] neuro   [] other: Patient Education: [x] Review HEP    [] Progressed/Changed HEP based on:   [] positioning   [] body mechanics   [] transfers   [] heat/ice application    [] other:      Other Objective/Functional Measures:   Min A with walking in parallel bars  Min A with sit to stand transfers  Max A left LE onto table  Decreased right hip hike noted in supine      Pain Level (0-10 scale) post treatment: 9    ASSESSMENT/Changes in Function: Pt tolerated session well with reporting some increase pain post session after walking in parallel bars with min A. Consistent verbal cues with coordination for alternating activities. Pt reporting exercises at home are helping to decrease hip pain. Improved symmetrical weight bearing with standing with sit to stands with tactile cues to increase left LE WB. Patient will continue to benefit from skilled PT services to modify and progress therapeutic interventions, address functional mobility deficits, address ROM deficits, address strength deficits, analyze and cue movement patterns, analyze and modify body mechanics/ergonomics, assess and modify postural abnormalities, address imbalance/dizziness and instruct in home and community integration to attain remaining goals. [x]  See Plan of Care  []  See progress note/recertification  []  See Discharge Summary         Progress towards goals / Updated goals:  Short Term Goals: STG- To be accomplished in 4 week(s):  1.  Pt will be independent with HEP to encourage prophylaxis. Eval: HEP dispensed   Current: compliance, updated today progressing 10/5/21     2.  Pt will be able to stand for > 10 minutes at home with pain </=5/10 in right hip to demonstrate improved loading on right LE  Eval: currently unable to stand due to right hip pain  Current status 10/7/21: improved standing tolerance and lateral WS with reports of reduced pain to 5/10,progressing     Long Term Goals: LTG- To be accomplished in 8 week(s):  1.  Pt will report 1 or no falls per week to indicate improved safety with SPT transfers and return to PLOF  Eval: falling everyday with SPT  Current: 1 fall since eval progressing 10/5/21  Current status 10/7/21: one fall on 10/6/21, no reports of pain due to fall     2.  Pt will be able to complete 5x sit to stand from 17in in < 20 seconds with demonstrating safe technique to indicate improved functional LE strength to decrease burden with transfers at home  Eval:25.5sec from 22in without UE assist, cues to lean forward, decreased WB on left LE, pushing off on back of table  Current: continued cues to shift weight forward and increased WB on left LE progressing 10/12/21     3.  Pt will be able to walk 50ft with SPC with pain </=2/10 in right hip to be able to ambulate short distances in home with increased ease  Eval:unable to walk due to pain in right hip, currenlty using manual wc or transport chair to get around    Current:      4.  Pt FOTO score will increase by >/= 18 points to show improvement in subjective function.   Eval:28  Current: will address at visit 5  *FOTO score is an established functional score where 100 = no disability*       PLAN  []  Upgrade activities as tolerated     [x]  Continue plan of care  []  Update interventions per flow sheet       []  Discharge due to:_  []  Other:_      Luis Patel 10/12/2021  1:29 PM    Future Appointments   Date Time Provider Marty Jj   10/12/2021  5:15 PM Jimi Calle [facility]   10/18/2021  3:00 PM Rani Aranda PT, DPT [facility]   10/20/2021  3:45 PM Jevon Valverde PT [facility]PT[facility]   10/26/2021  5:15 PM Jimi Calle [facility]   11/2/2021  4:30 PM Rani Aranda PT, DPT [facility]   11/4/2021  3:00 PM Karlos Jorgensen PA-C [facility]   11/4/2021  4:30 PM Rani Aranda PT, DPT [facility] THE [facility]

## 2021-10-14 ENCOUNTER — TELEPHONE (OUTPATIENT)
Dept: ORTHOPEDIC SURGERY | Age: 56
End: 2021-10-14

## 2021-10-14 NOTE — TELEPHONE ENCOUNTER
Radha from [address] [place] [address] in [place] (Leticia Pichardo \[facility] [place] [facility]\"") called in requesting the patient's continued plan of care signed and faxed over to their office in order for the patient to continue therapy.     Contact is [phone]

## 2021-10-18 ENCOUNTER — HOSPITAL ENCOUNTER (OUTPATIENT)
Dept: PHYSICAL THERAPY | Age: 56
Discharge: HOME | End: 2021-10-18
Attending: PHYSICIAN ASSISTANT
Payer: MEDICARE

## 2021-10-18 PROCEDURE — 97112 NEUROMUSCULAR REEDUCATION: CPT

## 2021-10-18 PROCEDURE — 97116 GAIT TRAINING THERAPY: CPT

## 2021-10-18 PROCEDURE — 97110 THERAPEUTIC EXERCISES: CPT

## 2021-10-18 PROCEDURE — 97530 THERAPEUTIC ACTIVITIES: CPT

## 2021-10-18 NOTE — PROGRESS NOTES
PT DAILY TREATMENT NOTE    Patient Name: Eladio Durant  Date:10/18/2021  : 1965  [x]  Patient  Verified  Payor: Teresa Edge / Plan: [address] [facility] HMO / Product Type: Managed Care Medicare /    In time:3:00  Out time:4:10  Total Treatment Time (min): 70  Total Timed Codes (min): 70  1:1 Treatment Time (MC/BCBS only): 60   Visit #: 5 of 16    Treatment Dx: Trochanteric bursitis, right hip [M70.61]    SUBJECTIVE  Pain Level (0-10 scale): 8  Any medication changes, allergies to medications, adverse drug reactions, diagnosis change, or new procedure performed?: [x] No    [] Yes (see summary sheet for update)  Subjective functional status/changes:   [] No changes reported  \"good\"  reporting walking about 20 steps at home and no falls. OBJECTIVE        25 min Therapeutic Exercise:  [x] See flow sheet :   Rationale: increase ROM and increase strength to improve the patients ability to perform daily activities with decreased pain and symptom levels    10 min Therapeutic Activity:  [x]  See flow sheet :   Rationale: increase strength, improve coordination and increase proprioception  to improve the patients ability to perform daily activities with decreased pain and symptom levels     20 min Neuromuscular Re-education:  [x]  See flow sheet :   Rationale: increase strength, improve coordination, improve balance and increase proprioception  to improve the patients ability to perform daily activities with decreased pain and symptom levels    15 min Gait Trainin laps in parallel bars with right UE support,    Rationale: To improve ambulation safety and efficiency in order to improve patient's ability to safely ambulate at home for self care.      With   [] TE   [] TA   [] neuro   [] other: Patient Education: [x] Review HEP    [] Progressed/Changed HEP based on:   [] positioning   [] body mechanics   [] transfers   [] heat/ice application    [] other:      Other Objective/Functional Measures:   FOTO 40   Improved coordination and WB through left LE with walking in parallel bars  Decreased left step length with walking  LE push off table with sit to stands      Pain Level (0-10 scale) post treatment: 6    ASSESSMENT/Changes in Function: Pt tolerated session well with reporting decreased pain post session in right hip. Improved WB through left LE noted in standing and walking in parallel bars today. Continued difficulty with anterior weight shift and left LE WB with sit to stands needing min A to CGA. Less right lateral lean noted in wc and in supine today. Cues needed to increase left step length with gait. Patient will continue to benefit from skilled PT services to modify and progress therapeutic interventions, address functional mobility deficits, address ROM deficits, address strength deficits, analyze and cue movement patterns, analyze and modify body mechanics/ergonomics, assess and modify postural abnormalities, address imbalance/dizziness and instruct in home and community integration to attain remaining goals. [x]  See Plan of Care  []  See progress note/recertification  []  See Discharge Summary         Progress towards goals / Updated goals:  Short Term Goals: STG- To be accomplished in 4 week(s):  1.  Pt will be independent with HEP to encourage prophylaxis. Eval: HEP dispensed   Current: compliance, updated today progressing 10/5/21     2.  Pt will be able to stand for > 10 minutes at home with pain </=5/10 in right hip to demonstrate improved loading on right LE  Eval: currently unable to stand due to right hip pain  Current status 10/7/21: improved standing tolerance and lateral WS with reports of reduced pain to 5/10,progressing     Long Term Goals: LTG- To be accomplished in 8 week(s):  1.  Pt will report 1 or no falls per week to indicate improved safety with SPT transfers and return to PLOF  Eval: falling everyday with SPT  Current: 1 fall since eval progressing 10/5/21  Current status 10/7/21: one fall on 10/6/21, no reports of pain due to fall     2.  Pt will be able to complete 5x sit to stand from 17in in < 20 seconds with demonstrating safe technique to indicate improved functional LE strength to decrease burden with transfers at home  Eval:25.5sec from 22in without UE assist, cues to lean forward, decreased WB on left LE, pushing off on back of table  Current:24.09 sec  From 22in, 23.51 sec progressing 10/18/21     3.  Pt will be able to walk 50ft with SPC with pain </=2/10 in right hip to be able to ambulate short distances in home with increased ease  Eval:unable to walk due to pain in right hip, currenlty using manual wc or transport chair to get around    Current:      4.  Pt FOTO score will increase by >/= 18 points to show improvement in subjective function.   Eval:28  Current: 40 progressing 10/18/21  *FOTO score is an established functional score where 100 = no disability*       PLAN  []  Upgrade activities as tolerated     [x]  Continue plan of care  []  Update interventions per flow sheet       []  Discharge due to:_  []  Other:_      Linda Andre 10/18/2021  1:31 PM    Future Appointments   Date Time Provider Marty Jj   10/18/2021  3:00 PM Shruthi Calle[facility]   10/20/2021  3:45 PM ELMO Louis [facility]   10/26/2021  5:15 PM Shruthi CalleHPTBW THE [facility]   11/2/2021  4:30 PM Reynaldo Keith, PT, DPT MI[facility]PTBW THE [facility]   11/4/2021  3:00 PM Lio Mansfield PA-C [facility]   11/4/2021  4:30 PM Reynaldo Keith PT, DPT [facility]

## 2021-10-20 ENCOUNTER — HOSPITAL ENCOUNTER (OUTPATIENT)
Dept: PHYSICAL THERAPY | Age: 56
Discharge: HOME | End: 2021-10-20
Attending: PHYSICIAN ASSISTANT
Payer: MEDICARE

## 2021-10-20 PROCEDURE — 97110 THERAPEUTIC EXERCISES: CPT

## 2021-10-20 PROCEDURE — 97112 NEUROMUSCULAR REEDUCATION: CPT

## 2021-10-20 PROCEDURE — 97116 GAIT TRAINING THERAPY: CPT

## 2021-10-20 NOTE — PROGRESS NOTES
PT DAILY TREATMENT NOTE    Patient Name: Amanda Stevens  Date:10/20/2021  : 1965  [x]  Patient  Verified  Payor: Daniela Durga / Plan: [address] HMO / Product Type: Managed Care Medicare /    In time:350  Out time:438  Total Treatment Time (min): 48  Total Timed Codes (min): 48  1:1 Treatment Time (MC/BCBS only): 48   Visit #: 6 of 16    Treatment Dx: Trochanteric bursitis, right hip [M70.61]    SUBJECTIVE  Pain Level (0-10 scale): 8/10 left hip  Any medication changes, allergies to medications, adverse drug reactions, diagnosis change, or new procedure performed?: [x] No    [] Yes (see summary sheet for update)  Subjective functional status/changes:   [] No changes reported  Reports no falls in 10 days however left hip pain still fluctuating, mostly 8/10  Reports scooting from one side of the bed to the other without thinking    OBJECTIVE      10 min Therapeutic Exercise:  [x] See flow sheet :   Rationale: increase ROM, increase strength and improve coordination to improve the patients ability to perform ADL       23 min Neuromuscular Re-education:  [x]  See flow sheet :facilitation of upper/low trunk separation, hip shift fwd/retro in seated and S/L, inhibition of left arm synergy/assisted ROM   Rationale: increase ROM, increase strength, improve coordination, improve balance and increase proprioception  to improve the patients ability to perform ADL with improved alignment      15 min Gait Training:  10 feet fwd/retro x 3 each with parallel bars on level surfaces with CGA level of assistance, trial of platform walker for 5 steps however patient unable to grasp handle with left hand causing difficulty to control walker   Rationale: To improve ambulation safety and efficiency in order to improve patient's ability to safely ambulate at home for self care.              With   [] TE   [] TA   [] neuro   [] other: Patient Education: [x] Review HEP    [] Progressed/Changed HEP based on:   [] positioning   [] body mechanics   [] transfers   [] heat/ice application    [] other:      Other Objective/Functional Measures:   -patient able to walk in parallel bars with CGA and VC to improve lateralization to left  -challenged with retro walking due to glut weakness however able to perform     Pain Level (0-10 scale) post treatment: 9    ASSESSMENT/Changes in Function: at times patient reports no pain in hips during seated shift however reports increased pain to 9/10 at end of session    Patient will continue to benefit from skilled PT services to address ROM deficits, address strength deficits, analyze and address soft tissue restrictions and analyze and cue movement patterns to attain remaining goals. [x]  See Plan of Care  []  See progress note/recertification  []  See Discharge Summary         Progress towards goals / Updated goals:  Short Term Goals: STG- To be accomplished in 4 week(s):  1.  Pt will be independent with HEP to encourage prophylaxis. Eval: HEP dispensed   Current: compliance, updated today progressing 10/5/21     2.  Pt will be able to stand for > 10 minutes at home with pain </=5/10 in right hip to demonstrate improved loading on right LE  Eval: currently unable to stand due to right hip pain  Current status 10/7/21: improved standing tolerance and lateral WS with reports of reduced pain to 5/10,progressing     Long Term Goals: LTG- To be accomplished in 8 week(s):  1.  Pt will report 1 or no falls per week to indicate improved safety with SPT transfers and return to PLOF  Eval: falling everyday with SPT  Current: 1 fall since eval progressing 10/5/21  Current status 10/7/21: one fall on 10/6/21, no reports of pain due to fall  Current status 10/20/21: patient reports no falls in 10 days, progressing     2.  Pt will be able to complete 5x sit to stand from 17in in < 20 seconds with demonstrating safe technique to indicate improved functional LE strength to decrease burden with transfers at home  Eval:25.5sec from 22in without UE assist, cues to lean forward, decreased WB on left LE, pushing off on back of table  Current:24.09 sec  From 22in, 23.51 sec progressing 10/18/21     3.  Pt will be able to walk 50ft with SPC with pain </=2/10 in right hip to be able to ambulate short distances in home with increased ease  Eval:unable to walk due to pain in right hip, currenlty using manual wc or transport chair to get around    Current:patient demonstrates ability to walk in parallel bars with CGA with improved WS to left, progressing      4.  Pt FOTO score will increase by >/= 18 points to show improvement in subjective function.   Eval:28  Current: 40 progressing 10/18/21  *FOTO score is an established functional score where 100 = no disability*       PLAN  []  Upgrade activities as tolerated     [x]  Continue plan of care  []  Update interventions per flow sheet       []  Discharge due to:_  []  Other:_      Morenita Casanova, PT 10/20/2021  3:53 PM    Future Appointments   Date Time Provider Marty Jj   10/26/2021  5:15 PM Amelia Calle [facility]PT[facility]   11/1/2021  4:30 PM Yasmine Phillips PT [facility]   11/3/2021  8:30 AM Sarah Calixto, PT, DPT [facility]   11/4/2021  3:00 PM Annamaria Avila PA-C [facility]

## 2021-10-26 ENCOUNTER — APPOINTMENT (OUTPATIENT)
Dept: PHYSICAL THERAPY | Age: 56
End: 2021-10-26
Attending: PHYSICIAN ASSISTANT
Payer: MEDICARE

## 2021-10-28 ENCOUNTER — HOSPITAL ENCOUNTER (OUTPATIENT)
Dept: PHYSICAL THERAPY | Age: 56
Discharge: HOME | End: 2021-10-28
Attending: PHYSICIAN ASSISTANT
Payer: MEDICARE

## 2021-10-28 PROCEDURE — 97530 THERAPEUTIC ACTIVITIES: CPT

## 2021-10-28 PROCEDURE — 97110 THERAPEUTIC EXERCISES: CPT

## 2021-10-28 NOTE — PROGRESS NOTES
PT DAILY TREATMENT NOTE    Patient Name: Mariam Tariq  Date:10/28/2021  : 1965  [x]  Patient  Verified  Payor: Nando Gilbert / Plan: [address] [facility] HMO / Product Type: Managed Care Medicare /    In time:1005  Out time:1050  Total Treatment Time (min): 45  Total Timed Codes (min): 45  1:1 Treatment Time (MC/BCBS only): 45   Visit #: 7 of 16    Treatment Dx: Trochanteric bursitis, right hip [M70.61]    SUBJECTIVE  Pain Level (0-10 scale): 10 left hip  Any medication changes, allergies to medications, adverse drug reactions, diagnosis change, or new procedure performed?: [x] No    [] Yes (see summary sheet for update)  Subjective functional status/changes:   [] No changes reported  No recent falls.  Overall improvement in hip pain but still fluctuating up to 10/10    OBJECTIVE      20 min Therapeutic Exercise:  [x] See flow sheet :worked on left arm /elbow/wrist Ext   Rationale: increase ROM, increase strength and improve coordination to improve the patients ability to perform ADL    25 min Therapeutic Activity:  [x]  See flow sheet :initiated lateral stepping activities in parallel bars, WS, fwd/retro walking, postural symmetry   Rationale: increase ROM, increase strength, improve coordination, improve balance, increase proprioception and walking pattern  to improve the patients ability to negotiate living space at home with more ease             With   [] TE   [x] TA   [] neuro   [] other: Patient Education: [x] Review HEP    [] Progressed/Changed HEP based on:   [] positioning   [] body mechanics   [] transfers   [] heat/ice application    [] other:      Other Objective/Functional Measures:  -continues with marked lateralization to right however improved sitting alignment after performing exercises   -difficulty releasing left arm due to dominant tone     Pain Level (0-10 scale) post treatment: 6    ASSESSMENT/Changes in Function: improvement in WS towards left side and ability to abduct left leg in order to perform side steps, tolerated walking for 30 foot distance without CGA with improved alignment and WS to left at end of session    Patient will continue to benefit from skilled PT services to address ROM deficits, address strength deficits, analyze and address soft tissue restrictions, analyze and cue movement patterns and assess and modify postural abnormalities to attain remaining goals. [x]  See Plan of Care  []  See progress note/recertification  []  See Discharge Summary         Progress towards goals / Updated goals:  Short Term Goals: STG- To be accomplished in 4 week(s):  1.  Pt will be independent with HEP to encourage prophylaxis. Eval: HEP dispensed   Current: compliance, updated today progressing 10/5/21     2.  Pt will be able to stand for > 10 minutes at home with pain </=5/10 in right hip to demonstrate improved loading on right LE  Eval: currently unable to stand due to right hip pain  Current status 10/7/21: improved standing tolerance and lateral WS with reports of reduced pain to 5/10,progressing     Long Term Goals: LTG- To be accomplished in 8 week(s):  1.  Pt will report 1 or no falls per week to indicate improved safety with SPT transfers and return to PLOF  Eval: falling everyday with SPT  Current: 1 fall since eval progressing 10/5/21  Current status 10/7/21: one fall on 10/6/21, no reports of pain due to fall  Current status 10/28/21: patient reports no falls in 18 days, progressing     2.  Pt will be able to complete 5x sit to stand from 17in in < 20 seconds with demonstrating safe technique to indicate improved functional LE strength to decrease burden with transfers at home  Eval:25.5sec from 22in without UE assist, cues to lean forward, decreased WB on left LE, pushing off on back of table  Current:24.09 sec  From 22in, 23.51 sec progressing 10/18/21     3.  Pt will be able to walk 50ft with SPC with pain </=2/10 in right hip to be able to ambulate short distances in home with increased ease  Eval:unable to walk due to pain in right hip, currenlty using manual wc or transport chair to get around    Current:patient demonstrates ability to walk in parallel bars with CGA with improved WS to left, progressing   Current status 10/28/21: patient demonstrates ability to walk fwd/bkw and perform side steps in parallel bars, walks with SC for 30 feet with CGA at end of session, pain left hip 6/10, progressing     4.  Pt FOTO score will increase by >/= 18 points to show improvement in subjective function.   Eval:28  Current: 40 progressing 10/18/21  *FOTO score is an established functional score where 100 = no disability*       PLAN  []  Upgrade activities as tolerated     [x]  Continue plan of care  []  Update interventions per flow sheet       []  Discharge due to:_  []  Other:_      Blake Manzano PT 10/28/2021  10:16 AM    Future Appointments   Date Time Provider [place] Jj   11/1/2021  4:30 PM Radha Frankel PT [facility]   11/3/2021  8:30 AM Verner Bunkers, PT, DPT [facility]   12/10/2021  8:30 AM Go Joyner PA-C [facility]

## 2021-11-01 ENCOUNTER — HOSPITAL ENCOUNTER (OUTPATIENT)
Dept: PHYSICAL THERAPY | Age: 56
Discharge: HOME | End: 2021-11-01
Attending: PHYSICIAN ASSISTANT
Payer: MEDICARE

## 2021-11-01 PROCEDURE — 97110 THERAPEUTIC EXERCISES: CPT

## 2021-11-01 PROCEDURE — 97112 NEUROMUSCULAR REEDUCATION: CPT

## 2021-11-01 NOTE — PROGRESS NOTES
PT DAILY TREATMENT NOTE    Patient Name: Cristo Gomez  Date:2021  : 1965  [x]  Patient  Verified  Payor: Tavo Alt / Plan: [facility] / Product Type: Medicare /    In time:305  Out time:346  Total Treatment Time (min): 41  Total Timed Codes (min): 41  1:1 Treatment Time (MC/BCBS only): 41   Visit #: 8 of 16    Treatment Dx:  Trochanteric bursitis, right hip [M70.61]    SUBJECTIVE  Pain Level (0-10 scale): 6 left hip  Any medication changes, allergies to medications, adverse drug reactions, diagnosis change, or new procedure performed?: [x] No    [] Yes (see summary sheet for update)  Subjective functional status/changes:   [] No changes reported  Reports walking on gravel with her  over the weekend without falling which she has never been able to do before    OBJECTIVE        15 min Therapeutic Exercise:  [x] See flow sheet :   Rationale: increase ROM, increase strength and improve coordination to improve the patients ability to perform ADL       26 min Neuromuscular Re-education:  [x]  See flow sheet :focus on gait, static and dynamic balance in sitting on DD and standing   Rationale: increase ROM, increase strength, improve coordination, improve balance and increase proprioception  to improve the patients ability to maintain posture independently with ADL            With   [x] TE   [] TA   [] neuro   [] other: Patient Education: [x] Review HEP    [] Progressed/Changed HEP based on:   [] positioning   [] body mechanics   [] transfers   [] heat/ice application    [] other:      Other Objective/Functional Measures: initially patient requiring assist to maintain sitting position on DD however able to hole position independently at end of session, still challenged with lateralization to left during gait, improved endurance and walking tolernace     Pain Level (0-10 scale) post treatment: 4    ASSESSMENT/Changes in Function: patient tolerating routine well with occasional rest periods, reduction in left hip pain towards end of session, improved sitting and standing alignment, challenged with unsupported standing balance with NBOS    Patient will continue to benefit from skilled PT services to address ROM deficits, address strength deficits, analyze and address soft tissue restrictions, assess and modify postural abnormalities and imbalance to attain remaining goals. [x]  See Plan of Care  []  See progress note/recertification  []  See Discharge Summary         Progress towards goals / Updated goals:  Short Term Goals: STG- To be accomplished in 4 week(s):  1.  Pt will be independent with HEP to encourage prophylaxis. Eval: HEP dispensed   Current: compliance, updated today progressing 10/5/21     2.  Pt will be able to stand for > 10 minutes at home with pain </=5/10 in right hip to demonstrate improved loading on right LE  Eval: currently unable to stand due to right hip pain  Current status 10/7/21: improved standing tolerance and lateral WS with reports of reduced pain to 5/10,progressing     Long Term Goals: LTG- To be accomplished in 8 week(s):  1.  Pt will report 1 or no falls per week to indicate improved safety with SPT transfers and return to PLOF  Eval: falling everyday with SPT  Current: 1 fall since eval progressing 10/5/21  Current status 10/7/21: one fall on 10/6/21, no reports of pain due to fall  Current status 10/28/21: patient reports no falls in 18 days, progressing     2.  Pt will be able to complete 5x sit to stand from 17in in < 20 seconds with demonstrating safe technique to indicate improved functional LE strength to decrease burden with transfers at home  Eval:25.5sec from 22in without UE assist, cues to lean forward, decreased WB on left LE, pushing off on back of table  Current:24.09 sec  From 22in, 23.51 sec progressing 10/18/21     3.  Pt will be able to walk 50ft with SPC with pain </=2/10 in right hip to be able to ambulate short distances in home with increased ease  Eval:unable to walk due to pain in right hip, currenlty using manual wc or transport chair to get around    Current:patient demonstrates ability to walk in parallel bars with CGA with improved WS to left, progressing   Current status 11/1/21: patient demonstrates ability to walk fwd/bkw and perform side steps in parallel bars, walks with SC for 50 feet with CGA at end of session, pain left hip 4/10, progressing     4.  Pt FOTO score will increase by >/= 18 points to show improvement in subjective function.   Eval:28  Current: 40 progressing 10/18/21  *FOTO score is an established functional score where 100 = no disability*       PLAN  []  Upgrade activities as tolerated     [x]  Continue plan of care  []  Update interventions per flow sheet       []  Discharge due to:_  []  Other:_      Alonso Baird, PT 11/1/2021  3:50 PM    Future Appointments   Date Time Provider Marty Jj   11/3/2021  8:30 AM Oksana Vazquez, PT, DPT [facility]   12/10/2021  8:30 AM Rogelio Mosley PA-C [facility]

## 2021-11-02 ENCOUNTER — APPOINTMENT (OUTPATIENT)
Dept: PHYSICAL THERAPY | Age: 56
End: 2021-11-02
Attending: PHYSICIAN ASSISTANT
Payer: MEDICARE

## 2021-11-03 ENCOUNTER — HOSPITAL ENCOUNTER (OUTPATIENT)
Dept: PHYSICAL THERAPY | Age: 56
Discharge: HOME | End: 2021-11-03
Attending: PHYSICIAN ASSISTANT
Payer: MEDICARE

## 2021-11-03 PROCEDURE — 97530 THERAPEUTIC ACTIVITIES: CPT

## 2021-11-03 PROCEDURE — 97112 NEUROMUSCULAR REEDUCATION: CPT

## 2021-11-03 PROCEDURE — 97116 GAIT TRAINING THERAPY: CPT

## 2021-11-03 PROCEDURE — 97110 THERAPEUTIC EXERCISES: CPT

## 2021-11-03 NOTE — PROGRESS NOTES
PT DAILY TREATMENT NOTE    Patient Name: Jung Soler  Date:11/3/2021  : 1965  [x]  Patient  Verified  Payor: Sirena Maldonado / Plan: [address] [facility] HMO / Product Type: Managed Care Medicare /    In time:8:36  Out time:9:30   Total Treatment Time (min): 54  Total Timed Codes (min): 54  1:1 Treatment Time (MC/BCBS only): 54   Visit #: 9 of 16    Treatment Dx: Trochanteric bursitis, right hip [M70.61]    SUBJECTIVE  Pain Level (0-10 scale): 10  Any medication changes, allergies to medications, adverse drug reactions, diagnosis change, or new procedure performed?: [x] No    [] Yes (see summary sheet for update)  Subjective functional status/changes:   [] No changes reported  \"It;s hurting today but overall getting better. \"    OBJECTIVE        16 min Therapeutic Exercise:  [x] See flow sheet :   Rationale: increase ROM and increase strength to improve the patients ability to perform daily activities with decreased pain and symptom levels    15 min Therapeutic Activity:  [x]  See flow sheet :   Rationale: increase strength, improve coordination and increase proprioception  to improve the patients ability to perform daily activities with decreased pain and symptom levels     15 min Neuromuscular Re-education:  [x]  See flow sheet :   Rationale: increase strength, improve coordination, improve balance and increase proprioception  to improve the patients ability to perform daily activities with decreased pain and symptom levels      8 min Gait Training:  Step taps in bars to improve weight shifting for steps with gait, 100ft with SPC with focus on increasing WB on left LE   Rationale: To improve ambulation safety and efficiency in order to improve patient's ability to safely ambulate at home for self care.      With   [] TE   [] TA   [] neuro   [] other: Patient Education: [x] Review HEP    [] Progressed/Changed HEP based on:   [] positioning   [] body mechanics   [] transfers   [] heat/ice application    [] other:      Other Objective/Functional Measures:   100ft with SPC with improving WB through left LE  Cues to increase anterior weight shift with sit to stands, min A needed to decreased reliance on table to stand up     Pain Level (0-10 scale) post treatment: 5    ASSESSMENT/Changes in Function: Pt tolerated session well with reporting decreased right pain post session even after walking 100ft. Noticing improvement with increase WB on left LE with walking however challenged with alternating step taps today. Overall improved alignment noted in sitting and supine with less right lateral lean. Pt continues to need cues to increase anterior lean with sit to stands to decrease reliance on table to stand up. Patient will continue to benefit from skilled PT services to modify and progress therapeutic interventions, address functional mobility deficits, address strength deficits, analyze and cue movement patterns, analyze and modify body mechanics/ergonomics, assess and modify postural abnormalities, address imbalance/dizziness and instruct in home and community integration to attain remaining goals. [x]  See Plan of Care  []  See progress note/recertification  []  See Discharge Summary         Progress towards goals / Updated goals:  Short Term Goals: STG- To be accomplished in 4 week(s):  1.  Pt will be independent with HEP to encourage prophylaxis. Eval: HEP dispensed   Current: compliance, updated today progressing 10/5/21     2.  Pt will be able to stand for > 10 minutes at home with pain </=5/10 in right hip to demonstrate improved loading on right LE  Eval: currently unable to stand due to right hip pain  Current 8/10 max pain with standing > 10min progressing 11/3/21     Long Term Goals: LTG- To be accomplished in 8 week(s):  1.  Pt will report 1 or no falls per week to indicate improved safety with SPT transfers and return to PLOF  Eval: falling everyday with SPT  Current: 1 fall since eval progressing 10/5/21  Current status 10/7/21: one fall on 10/6/21, no reports of pain due to fall  Current status 10/28/21: patient reports no falls in 18 days, progressing     2.  Pt will be able to complete 5x sit to stand from 17in in < 20 seconds with demonstrating safe technique to indicate improved functional LE strength to decrease burden with transfers at home  Eval:25.5sec from 22in without UE assist, cues to lean forward, decreased WB on left LE, pushing off on back of table  Current:24.09 sec  From 22in, 23.51 sec progressing 10/18/21  Current: 18.5sec from 22in, 25 sec from 18in with pushing off back of table progressing 11/3/21     3.  Pt will be able to walk 50ft with SPC with pain </=2/10 in right hip to be able to ambulate short distances in home with increased ease  Eval:unable to walk due to pain in right hip, currenlty using manual wc or transport chair to get around    Current:patient demonstrates ability to walk in parallel bars with CGA with improved WS to left, progressing   Current status 11/1/21: patient demonstrates ability to walk fwd/bkw and perform side steps in parallel bars, walks with SC for 50 feet with CGA at end of session, pain left hip 4/10, progressing     4.  Pt FOTO score will increase by >/= 18 points to show improvement in subjective function.   Eval:28  Current: 40 progressing 10/18/21  *FOTO score is an established functional score where 100 = no disability*          PLAN  []  Upgrade activities as tolerated     [x]  Continue plan of care  []  Update interventions per flow sheet       []  Discharge due to:_  []  Other:_      Juju WarrenHarshil 11/3/2021  7:49 AM    Future Appointments   Date Time Provider Marty Jj   11/3/2021  8:30 AM Za Calle [facility]   12/10/2021  8:30 AM Rolando Greene PA-C [facility]

## 2021-11-04 ENCOUNTER — APPOINTMENT (OUTPATIENT)
Dept: PHYSICAL THERAPY | Age: 56
End: 2021-11-04
Attending: PHYSICIAN ASSISTANT
Payer: MEDICARE

## 2021-11-04 NOTE — PROGRESS NOTES
[facility] at the [address], [place], [address] [facility]  Phone: [phone]      Fax:  [phone]    Progress Note  Patient name: Lisa Palomares Start of Care: 21   Referral source: Dima Alarcon : 1965               Medical Diagnosis: Greater trochanteric bursitis of right hip [M70.61]    Onset Date:2021               Treatment Diagnosis: right hip pain    Prior Hospitalization: see medical history Provider#: [id number]   Medications: Verified on Patient summary List    Comorbidities:  CVA 7 years ago with affecting left side, spinia bifidia, depression, left RENATO  with revision , multiple shunt sx   Prior Level of Function: walking with SPC around home with using manual wc at times and transport chair out of the house, only falling every once in a while not everyday, mod I with ADLs,  not having to assist with transfers                                Visits from Start of Care: 7    Missed Visits: 0    Progress towards goals / Updated goals:  Short Term Goals: STG- To be accomplished in 4 week(s):  1.  Pt will be independent with HEP to encourage prophylaxis. Eval: HEP dispensed   Current: compliance, updated today progressing 10/5/21     2.  Pt will be able to stand for > 10 minutes at home with pain </=5/10 in right hip to demonstrate improved loading on right LE  Eval: currently unable to stand due to right hip pain  Current status 10/7/21: improved standing tolerance and lateral WS with reports of reduced pain to 5/10,progressing     Long Term Goals: LTG- To be accomplished in 8 week(s):  1.  Pt will report 1 or no falls per week to indicate improved safety with SPT transfers and return to PLOF  Eval: falling everyday with SPT  Current: 1 fall since eval progressing 10/5/21  Current status 10/7/21: one fall on 10/6/21, no reports of pain due to fall  Current status 10/28/21: patient reports no falls in 18 days, progressing     2.  Pt will be able to complete 5x sit to stand from 17in in < 20 seconds with demonstrating safe technique to indicate improved functional LE strength to decrease burden with transfers at home  Eval:25.5sec from 22in without UE assist, cues to lean forward, decreased WB on left LE, pushing off on back of table  Current:24.09 sec  From 22in, 23.51 sec progressing 10/18/21     3.  Pt will be able to walk 50ft with SPC with pain </=2/10 in right hip to be able to ambulate short distances in home with increased ease  Eval:unable to walk due to pain in right hip, currenlty using manual wc or transport chair to get around    Current:patient demonstrates ability to walk in parallel bars with CGA with improved WS to left, progressing   Current status 10/28/21: patient demonstrates ability to walk fwd/bkw and perform side steps in parallel bars, walks with SC for 30 feet with CGA at end of session, pain left hip 6/10, progressing     4.  Pt FOTO score will increase by >/= 18 points to show improvement in subjective function. Eval:28  Current: 40 progressing 10/18/21  *FOTO score is an established functional score where 100 = no disability*       Key Functional Changes: improved stability and tolerance to gait, no recent falls  Updated Goals: to be achieved in 4 weeks:   Remaining goals as above  ASSESSMENT/RECOMMENDATIONS:Mrs. Jesica Mccollum has attended a total of 7 visits including evaluation with focus on postural alignment, gait/balance training, therapeutic ex and instruction in HEP. She has shown nice improvement in postural /pevic/hip alignment in sitting and standing position, gait pattern and walking tolerance. She continues with fluctuating levels of pain mostly in her left hip, strength and balance deficit. I recommend to continue with current treatment to address remaining deficits and goals.     [x]Continue therapy per initial plan/protocol at a frequency of  2 x per week for 8 weeks/ 9 remaining visits    Thank you for this referral.   Abdirashid Heredia, PT 11/4/2021 4:12 PM

## 2021-11-18 ENCOUNTER — HOSPITAL ENCOUNTER (OUTPATIENT)
Dept: PHYSICAL THERAPY | Age: 56
Discharge: HOME | End: 2021-11-18
Attending: PHYSICIAN ASSISTANT
Payer: MEDICARE

## 2021-11-18 PROCEDURE — 97110 THERAPEUTIC EXERCISES: CPT

## 2021-11-18 PROCEDURE — 97112 NEUROMUSCULAR REEDUCATION: CPT

## 2021-11-18 NOTE — PROGRESS NOTES
PT DAILY TREATMENT NOTE    Patient Name: Cristo Gomez  Date:2021  : 1965  [x]  Patient  Verified  Payor: Smitha Mari / Plan: [address] HMO / Product Type: Managed Care Medicare /    In time:830  Out time:924  Total Treatment Time (min): 54  Total Timed Codes (min): 54  1:1 Treatment Time (MC/BCBS only): 54   Visit #: 10 of 16    Treatment Dx: Trochanteric bursitis, right hip [M70.61]    SUBJECTIVE  Pain Level (0-10 scale): 9   Any medication changes, allergies to medications, adverse drug reactions, diagnosis change, or new procedure performed?: [x] No    [] Yes (see summary sheet for update)  Subjective functional status/changes:   [] No changes reported  Pain right hip.  Reports falling in bathroom last week however was able to crawl to her bedroom and get up onto bed which she hasn't been able to since 2021 as per patient    OBJECTIVE        25 min Therapeutic Exercise:  [x] See flow sheet :   Rationale: increase ROM, increase strength and improve coordination to improve the patients ability to perform ADL with more ease       29 min Neuromuscular Re-education:  [x]  See flow sheet :gait activities, standing balance with focus on increased lateralization to left   Rationale: increase ROM, increase strength, improve coordination, improve balance and increase proprioception  to improve the patients ability to perform ADL with increased use of left leg     With   [] TE   [] TA   [] neuro   [] other: Patient Education: [x] Review HEP    [] Progressed/Changed HEP based on:   [] positioning   [] body mechanics   [] transfers   [] heat/ice application    [] other:      Other Objective/Functional Measures:   -demonstrates ability to stand without external support/SBA for 30 sec in parallel bars  -mild instability with gait using SC today, CGA for safety  -challenged to keep neutral hip rotation in supine, requires assist to perform SB curl       Pain Level (0-10 scale) post treatment: 10 right hip    ASSESSMENT/Changes in Function: patient able to participate in therapy with steady right hip pain however no antalgia was observed, she continues to be challenged to initiate isolated left knee Flex in seated and standing position when initiating step back, marked LS compensation and hip hiking    Patient will continue to benefit from skilled PT services to address functional mobility deficits, address ROM deficits, address strength deficits, analyze and address soft tissue restrictions and assess and modify postural abnormalities to attain remaining goals. [x]  See Plan of Care  []  See progress note/recertification  []  See Discharge Summary           Progress towards goals / Updated goals:  Short Term Goals: STG- To be accomplished in 4 week(s):  1.  Pt will be independent with HEP to encourage prophylaxis. Eval: HEP dispensed   Current: compliance, updated today progressing 10/5/21     2.  Pt will be able to stand for > 10 minutes at home with pain </=5/10 in right hip to demonstrate improved loading on right LE  Eval: currently unable to stand due to right hip pain  Current 8/10 max pain with standing > 10min progressing 11/3/21  Current status 11/18/21: high pain levels during PT session today ranging 8-10/10     Long Term Goals: LTG- To be accomplished in 8 week(s):  1.  Pt will report 1 or no falls per week to indicate improved safety with SPT transfers and return to PLOF  Eval: falling everyday with SPT  Current: 1 fall since eval progressing 10/5/21  Current status 10/7/21: one fall on 10/6/21, no reports of pain due to fall  Current status 10/28/21: patient reports no falls in 18 days, progressing  Current status 11/18/21: patient reports one fall in bathroom last week     2.  Pt will be able to complete 5x sit to stand from 17in in < 20 seconds with demonstrating safe technique to indicate improved functional LE strength to decrease burden with transfers at home  Eval:25.5sec from 22in without UE assist, cues to lean forward, decreased WB on left LE, pushing off on back of table  Current:24.09 sec  From 22in, 23.51 sec progressing 10/18/21  Current: 18.5sec from 22in, 25 sec from 18in with pushing off back of table progressing 11/3/21     3.  Pt will be able to walk 50ft with SPC with pain </=2/10 in right hip to be able to ambulate short distances in home with increased ease  Eval:unable to walk due to pain in right hip, currenlty using manual wc or transport chair to get around    Current:patient demonstrates ability to walk in parallel bars with CGA with improved WS to left, progressing   Current status 11/1/21: patient demonstrates ability to walk fwd/bkw and perform side steps in parallel bars, walks with SC for 50 feet with CGA at end of session, pain left hip 4/10, progressing  Current status 11/18/21: patient demonstrates ability to walk 70 feet with SPC however reports 10/10 pain, progressing     4.  Pt FOTO score will increase by >/= 18 points to show improvement in subjective function.   Eval:28  Current: 40 progressing 10/18/21  *FOTO score is an established functional score where 100 = no disability*       PLAN  []  Upgrade activities as tolerated     [x]  Continue plan of care   []  Update interventions per flow sheet       []  Discharge due to:_  []  Other:_      Sumaya Lowe, PT 11/18/2021  8:38 AM    Future Appointments   Date Time Provider [place] Jj   11/23/2021  7:45 AM ELMO Crane [facility]   11/29/2021  7:45 AM ELMO Crane [facility]   12/1/2021  7:45 AM Oiv Calle [facility]   12/10/2021  8:30 AM Jocelyn Smyth PA-C [facility]

## 2021-11-23 ENCOUNTER — HOSPITAL ENCOUNTER (OUTPATIENT)
Dept: PHYSICAL THERAPY | Age: 56
Discharge: HOME | End: 2021-11-23
Attending: PHYSICIAN ASSISTANT
Payer: MEDICARE

## 2021-11-23 PROCEDURE — 97116 GAIT TRAINING THERAPY: CPT

## 2021-11-23 PROCEDURE — 97110 THERAPEUTIC EXERCISES: CPT

## 2021-11-23 PROCEDURE — 97112 NEUROMUSCULAR REEDUCATION: CPT

## 2021-11-23 NOTE — PROGRESS NOTES
PT DAILY TREATMENT NOTE    Patient Name: Eladio Durant  Date:2021  : 1965  [x]  Patient  Verified  Payor: [employer]ICARE / Plan: [facility] [address]  HMO / Product Type: Managed Care Medicare /    In time:745  Out time:835  Total Treatment Time (min): 50  Total Timed Codes (min): 50  1:1 Treatment Time (MC/BCBS only): 50   Visit #: 11 of 16    Treatment Dx: Pain in right hip [M25.551]  Other bursitis of hip, right hip [M70.71]    SUBJECTIVE  Pain Level (0-10 scale): 8 right hip  Any medication changes, allergies to medications, adverse drug reactions, diagnosis change, or new procedure performed?: [x] No    [] Yes (see summary sheet for update)  Subjective functional status/changes:   [] No changes report  \" When I am walking around my back is much more straight now\"    OBJECTIVE      20 min Therapeutic Exercise:  [x] See flow sheet :   Rationale: increase ROM, increase strength and improve coordination to improve the patients ability to perform ADL       20 min Neuromuscular Re-education:  [x]  See flow sheet :focus on lumbo-pelvic and spinal alignment    Rationale: increase ROM, increase strength, improve coordination, improve balance and increase proprioception  to improve the patients ability to perform ADL with improved symmetry and ease      10 min Gait Training: in parallel bars with focus on lateralization to left, fwd/retro and lateral stepping   Rationale: To improve ambulation safety and efficiency in order to improve patient's ability to safely ambulate at home for self care.              With   [x] TE   [] TA   [] neuro   [] other: Patient Education: [x] Review HEP    [] Progressed/Changed HEP based on:   [] positioning   [] body mechanics   [] transfers   [] heat/ice application    [] other:      Other Objective/Functional Measures:   -marked reduction in hip pain with improved pelvic/spinal/hip alignment during left hip protraction jack in S/L  -patient unable to perform left hip lift in hooklying due to strength/motor control deficit  -patient doing well with active left hip protraction in S/L using foam roller     Pain Level (0-10 scale) post treatment: 4    ASSESSMENT/Changes in Function: good tolerance to all activities, still difficulty to isolate hip/pelvic/spinal movements, marked weakness left hip Flex/Glut/hamstring however improved form with retro steps    Patient will continue to benefit from skilled PT services to address ROM deficits, address strength deficits, analyze and address soft tissue restrictions, analyze and cue movement patterns and assess and modify postural abnormalities to attain remaining goals. [x]  See Plan of Care  [x]  See progress note/recertification  []  See Discharge Summary         Progress towards goals / Updated goals:  Short Term Goals: STG- To be accomplished in 4 week(s):  1.  Pt will be independent with HEP to encourage prophylaxis. Eval: HEP dispensed   Current: 11/23/21: daily compliance, increased walking, progressing     2.  Pt will be able to stand for > 10 minutes at home with pain </=5/10 in right hip to demonstrate improved loading on right LE  Eval: currently unable to stand due to right hip pain  Current 8/10 max pain with standing > 10min progressing 11/3/21  Current status 11/23/21, ability to stand for >or= 10 min during ADL including doing dishes, pain still fluctuating on average 6/10 with standing, progressing     Long Term Goals: LTG- To be accomplished in 8 week(s):  1.  Pt will report 1 or no falls per week to indicate improved safety with SPT transfers and return to PLOF  Eval: falling everyday with SPT  Current: 1 fall since eval progressing 10/5/21  Current status 10/7/21: one fall on 10/6/21, no reports of pain due to fall  Current status 10/28/21: patient reports no falls in 18 days, progressing  Current status 11/18/21: patient reports one fall in bathroom last week, progressing overall     2.  Pt will be able to complete 5x sit to stand from 17in in < 20 seconds with demonstrating safe technique to indicate improved functional LE strength to decrease burden with transfers at home  Eval:25.5sec from 22in without UE assist, cues to lean forward, decreased WB on left LE, pushing off on back of table  Current:24.09 sec  From 22in, 23.51 sec progressing 10/18/21  Current: 18.5sec from 22in, 25 sec from 18in with pushing off back of table progressing 11/3/21     3.  Pt will be able to walk 50ft with SPC with pain </=2/10 in right hip to be able to ambulate short distances in home with increased ease  Eval:unable to walk due to pain in right hip, currenlty using manual wc or transport chair to get around    Current:patient demonstrates ability to walk in parallel bars with CGA with improved WS to left, progressing   Current status 11/1/21: patient demonstrates ability to walk fwd/bkw and perform side steps in parallel bars, walks with SC for 50 feet with CGA at end of session, pain left hip 4/10, progressing  Current status 11/23/21: patient demonstrates ability to walk 70 feet with SPC during previous visit however reports fluctuating  Pain, current pain level 4/10 with ambulation at end of session, progressing     4.  Pt FOTO score will increase by >/= 18 points to show improvement in subjective function.   Eval:28  Current: 41 progressing 11/22/21  *FOTO score is an established functional score where 100 = no disability*       PLAN  []  Upgrade activities as tolerated     [x]  Continue plan of care  []  Update interventions per flow sheet       []  Discharge due to:_  []  Other:_      Stephanie Carrillo, PT 11/23/2021  8:14 AM    Future Appointments   Date Time Provider Marty Jj   11/29/2021  7:45 AM ELMO Noble [facility]   12/1/2021  7:45 AM Vandana aClle [facility]   12/10/2021  8:30 AM Cody Danielson PA-C [facility]

## 2021-11-23 NOTE — PROGRESS NOTES
[facility] at the [address], [place], [address] [facility]  Phone: [phone]      Fax:  [phone]    Progress Note  Patient name: Emilia Rosado Maverick of Care: 21   Referral source: Ellen Castillo PA-C : 1965               Medical Diagnosis: Greater trochanteric bursitis of right hip [M70.61]    Onset Date:2021               Treatment Diagnosis: right hip pain    Prior Hospitalization: see medical history Provider#: [id number]   Medications: Verified on Patient summary List    Comorbidities: Albertina Dawood 7 years ago with affecting left side, spinia bifidia, depression, left RENATO  with revision , multiple shunt sx   Prior Level of Function: walking with SPC around home with using manual wc at times and transport chair out of the house, only falling every once in a while not everyday, mod I with ADLs,  not having to assist with transfers                                  Visits from Start of Care: 11    Missed Visits: 0      Progress towards goals / Updated goals:  Short Term Goals: STG- To be accomplished in 4 week(s):  1.  Pt will be independent with HEP to encourage prophylaxis. Eval: HEP dispensed   Current: 21: daily compliance, increased walking, progressing     2.  Pt will be able to stand for > 10 minutes at home with pain </=5/10 in right hip to demonstrate improved loading on right LE  Eval: currently unable to stand due to right hip pain  Current 8/10 max pain with standing > 10min progressing 11/3/21  Current status 21, ability to stand for >or= 10 min during ADL including doing dishes, pain still fluctuating on average 6/10 with standing, progressing     Long Term Goals: LTG- To be accomplished in 8 week(s):  1.  Pt will report 1 or no falls per week to indicate improved safety with SPT transfers and return to PLOF  Eval: falling everyday with SPT  Current: 1 fall since eval progressing 10/5/21  Current status 10/7/21: one fall on 10/6/21, no reports of pain due to fall  Current status 10/28/21: patient reports no falls in 18 days, progressing  Current status 11/18/21: patient reports one fall in bathroom last week, progressing overall     2.  Pt will be able to complete 5x sit to stand from 17in in < 20 seconds with demonstrating safe technique to indicate improved functional LE strength to decrease burden with transfers at home  Eval:25.5sec from 22in without UE assist, cues to lean forward, decreased WB on left LE, pushing off on back of table  Current:24.09 sec  From 22in, 23.51 sec progressing 10/18/21  Current: 18.5sec from 22in, 25 sec from 18in with pushing off back of table progressing 11/3/21     3.  Pt will be able to walk 50ft with SPC with pain </=2/10 in right hip to be able to ambulate short distances in home with increased ease  Eval:unable to walk due to pain in right hip, currenlty using manual wc or transport chair to get around    Current:patient demonstrates ability to walk in parallel bars with CGA with improved WS to left, progressing   Current status 11/1/21: patient demonstrates ability to walk fwd/bkw and perform side steps in parallel bars, walks with SC for 50 feet with CGA at end of session, pain left hip 4/10, progressing  Current status 11/23/21: patient demonstrates ability to walk 70 feet with SPC during previous visit however reports fluctuating  Pain, current pain level 4/10 with ambulation at end of session, progressing     4.  Pt FOTO score will increase by >/= 18 points to show improvement in subjective function. Eval:28  Current: 41 progressing 11/22/21  *FOTO score is an established functional score where 100 = no disability*       Key Functional Changes: increased ambulation with reduction in falls  Updated Goals: to be achieved in 4 weeks:   LTG 5.  Patient demonstrates ability to walk >or= 100 feet with SPC with pain <or= 5/10 for increased ease with household ambulation and limited community ambulation  Current status : patient able to walk 70 feet with fluctuating pain levels 6-10/10    ASSESSMENT/RECOMMENDATIONS:Mrs. Marilee Tse has attended 11 visits including initial evaluation with focus on therapeutic ex for strengthening, balance training, gait training, neuromuscular facilitation for improved alignment and increased left sided activity, instruction in HEP. Patient is showing nice improvement in overall function (FOTO 40/100), ambulatory balance, reduction in falls and increased ease with all transfers. She continues with deficit in balance, strength, mobility and  fluctuating pain levels up to 10/10 in her right hip likely due to postural alignment and right side overuse. I recommend continuation of current treatment to address remaining goals.    [x]Continue therapy per initial plan/protocol at a frequency of  2 x per week for 4 weeks    Thank you for this referral.   Karen Nicole, PT 11/23/2021 8:46 AM

## 2021-11-29 ENCOUNTER — APPOINTMENT (OUTPATIENT)
Dept: PHYSICAL THERAPY | Age: 56
End: 2021-11-29
Attending: PHYSICIAN ASSISTANT
Payer: MEDICARE

## 2021-12-01 ENCOUNTER — HOSPITAL ENCOUNTER (OUTPATIENT)
Dept: PHYSICAL THERAPY | Age: 56
End: 2021-12-01
Attending: PHYSICIAN ASSISTANT
Payer: MEDICARE

## 2021-12-08 ENCOUNTER — HOSPITAL ENCOUNTER (OUTPATIENT)
Dept: PHYSICAL THERAPY | Age: 56
Discharge: HOME | End: 2021-12-08
Attending: PHYSICIAN ASSISTANT
Payer: MEDICARE

## 2021-12-08 PROCEDURE — 97110 THERAPEUTIC EXERCISES: CPT

## 2021-12-08 PROCEDURE — 97112 NEUROMUSCULAR REEDUCATION: CPT

## 2021-12-08 PROCEDURE — 97530 THERAPEUTIC ACTIVITIES: CPT

## 2021-12-10 ENCOUNTER — OFFICE VISIT (OUTPATIENT)
Dept: ORTHOPEDIC SURGERY | Age: 56
End: 2021-12-10
Payer: MEDICARE

## 2021-12-10 VITALS
HEIGHT: 65 IN | BODY MASS INDEX: 23.32 KG/M2 | TEMPERATURE: 97.5 F | WEIGHT: 140 LBS | OXYGEN SATURATION: 98 % | HEART RATE: 72 BPM

## 2021-12-10 DIAGNOSIS — M70.61 GREATER TROCHANTERIC BURSITIS OF RIGHT HIP: Primary | ICD-10-CM

## 2021-12-10 DIAGNOSIS — M25.551 RIGHT HIP PAIN: ICD-10-CM

## 2021-12-10 DIAGNOSIS — Z96.642 HISTORY OF TOTAL LEFT HIP REPLACEMENT: ICD-10-CM

## 2021-12-10 PROCEDURE — G8420 CALC BMI NORM PARAMETERS: HCPCS | Performed by: PHYSICIAN ASSISTANT

## 2021-12-10 PROCEDURE — G8432 DEP SCR NOT DOC, RNG: HCPCS | Performed by: PHYSICIAN ASSISTANT

## 2021-12-10 PROCEDURE — 3017F COLORECTAL CA SCREEN DOC REV: CPT | Performed by: PHYSICIAN ASSISTANT

## 2021-12-10 PROCEDURE — G8427 DOCREV CUR MEDS BY ELIG CLIN: HCPCS | Performed by: PHYSICIAN ASSISTANT

## 2021-12-10 PROCEDURE — 99214 OFFICE O/P EST MOD 30 MIN: CPT | Performed by: PHYSICIAN ASSISTANT

## 2021-12-10 RX ORDER — LIDOCAINE 50 MG/G
1 PATCH TOPICAL EVERY 24 HOURS
Qty: 30 PATCH | Refills: 1 | Status: SHIPPED | OUTPATIENT
Start: 2021-12-10

## 2021-12-10 NOTE — PROGRESS NOTES
[address] [place] [address]  [phone]           Patient: Lidia Brown                MRN: [id number]       SSN: [id number]  YOB: 1965        AGE: 64 y.o. SEX: female  Body mass index is 23.3 kg/m². PCP: Felicity Bowman NP  12/10/21      This office note has been dictated. REVIEW OF SYSTEMS:  Constitutional: Negative for fever, chills, weight loss and malaise/fatigue. HENT: Negative. Eyes: Negative. Respiratory: Negative. Cardiovascular: Negative. Gastrointestinal: No bowel incontinence or constipation. Genitourinary: No bladder incontinence or saddle anesthesia. Skin: Negative. Neurological: Negative. Endo/Heme/Allergies: Negative. Psychiatric/Behavioral: Negative. Musculoskeletal: As per HPI above. Past Medical History:   Diagnosis Date    Arthritis     Cerebral artery occlusion with cerebral infarction ([address]     Constipation     Frequent UTI     Ill-defined condition     hydrocephalus    Ill-defined condition     tremors of right hand    Pituitary abnormality (HCC)     Pituitary mass (HCC)     benign    Psychiatric disorder     depression    Right hip pain     Self-catheterizes urinary bladder     Stroke ([address] 1972    left side paralysis         Current Outpatient Medications:     trospium (SANCTURA) 20 mg tablet, , Disp: , Rfl:     nitrofurantoin, macrocrystal-monohydrate, (MACROBID) 100 mg capsule, TAKE ONE CAPSULE BY MOUTH EVERY NIGHT, Disp: 30 Cap, Rfl: 5    omeprazole (PRILOSEC) 40 mg capsule, , Disp: , Rfl:     traZODone (DESYREL) 50 mg tablet, , Disp: , Rfl:     diclofenac (VOLTAREN) 1 % gel, , Disp: , Rfl:     acetaminophen (TYLENOL) 325 mg tablet, Take 500 mg by mouth every four (4) hours as needed for Pain., Disp: , Rfl:     lamoTRIgine (LAMICTAL) 150 mg tablet, Take 150 mg by mouth two (2) times a day.  Indications: DEPRESSION ASSOCIATED WITH BIPOLAR DISORDER, Disp: , Rfl:     bromocriptine (PARLODEL) 2.5 mg tablet, Take 2.5 mg by mouth daily (after dinner). , Disp: , Rfl:     gabapentin (NEURONTIN) 300 mg capsule, Take 300 mg by mouth daily. , Disp: , Rfl:     gabapentin (NEURONTIN) 600 mg tablet, Take 1,800 mg by mouth nightly., Disp: , Rfl:     citalopram (CELEXA) 20 mg tablet, Take 20 mg by mouth daily (with dinner). , Disp: , Rfl:     cholecalciferol (VITAMIN D3) 1,000 unit tablet, Take 1,000 Units by mouth daily. , Disp: , Rfl:     cyanocobalamin (VITAMIN B-12) 1,000 mcg tablet, Take 1,000 mcg by mouth daily. , Disp: , Rfl:     ascorbic acid, vitamin C, (VITAMIN C) 500 mg tablet, Take 500 mg by mouth daily. , Disp: , Rfl:     ALPRAZolam (XANAX) 0.5 mg tablet, , Disp: , Rfl:     Allergies   Allergen Reactions    Bupropion Hcl Seizures and Itching    Iodinated Contrast Media Unknown (comments)     As a child    Tramadol     Vicodin [Hydrocodone-Acetaminophen] Other (comments)    Wellbutrin [Bupropion] Seizures       Social History     Socioeconomic History    Marital status:      Spouse name: Not on file    Number of children: Not on file    Years of education: Not on file    Highest education level: Not on file   Occupational History    Not on file   Tobacco Use    Smoking status: Never Smoker    Smokeless tobacco: Never Used   Substance and Sexual Activity    Alcohol use: No    Drug use: No    Sexual activity: Not on file   Other Topics Concern    Not on file   Social History Narrative    Not on file     Social Determinants of Health     Financial Resource Strain:     Difficulty of Paying Living Expenses: Not on file   Food Insecurity:     Worried About Running Out of Food in the Last Year: Not on file    Felipa of Food in the Last Year: Not on file   Transportation Needs:     Lack of Transportation (Medical): Not on file    Lack of Transportation (Non-Medical):  Not on file   Physical Activity:     Days of Exercise per Week: Not on file    Minutes of Exercise per Session: Not on file   Stress:     Feeling of Stress : Not on file   Social Connections:     Frequency of Communication with Friends and Family: Not on file    Frequency of Social Gatherings with Friends and Family: Not on file    Attends Protestant Services: Not on file    Active Member of Clubs or Organizations: Not on file    Attends Club or Organization Meetings: Not on file    Marital Status: Not on file   Intimate Partner Violence:     Fear of Current or Ex-Partner: Not on file    Emotionally Abused: Not on file    Physically Abused: Not on file    Sexually Abused: Not on file   Housing Stability:     Unable to Pay for Housing in the Last Year: Not on file    Number of [place] in the Last Year: Not on file    Unstable Housing in the Last Year: Not on file       Past Surgical History:   Procedure Laterality Date    HX DILATION AND CURETTAGE      HX HIP REPLACEMENT      left     HX LAP CHOLECYSTECTOMY      HX ORTHOPAEDIC Left 2007    hip replacement    HX ORTHOPAEDIC Left 2011    hip revision    VASCULAR SURGERY PROCEDURE UNLIST      multiple shut surgerys           Patient seen evaluate today for her right hip. She does have known trochanter bursitis of the right hip. She had a cortisone injection in the past which only gave her a couple hours of relief. She has been in physical therapy which is helped her considerably. She does use Voltaren gel for the hip. She denies any radiating pain down the lower extremity. No change in bowel bladder habits. No recent injuries. No fevers or chills. Patient denies recent fevers, chills, chest pain, SOB, or injuries. No recent systemic changes noted. A 12-point review of systems is performed today. Pertinent positives are noted. All other systems reviewed and otherwise are negative. Physical exam: General: Alert and oriented x3, nad.  well-developed, well nourished.   normal affect, AF.  NC/AT, EOMI, neck supple, trachea midline, no JVD present. Breathing is non-labored. Examination of lower extremities reveals pain-free range of motion the hips. There is discomfort palpation to the bursa right side. None on the left. Negative straight leg raise. Negative calf tenderness. Negative Homans. No signs of DVT present. Assessment: Right hip trochanter bursitis    Plan: At this point, we discussed treatment options. We will put another order in for physical therapy to add ultrasound and iontophoresis along with the IT band stretching. She will continue with Voltaren gel. We will send a prescription for Lidoderm patches to the pharmacy. She is instructed on use as well as use precautions. We will see her back as needed.             JR Jonathan DECKER, FREDERICK, ATC

## 2021-12-14 ENCOUNTER — TELEPHONE (OUTPATIENT)
Dept: ORTHOPEDIC SURGERY | Age: 56
End: 2021-12-14

## 2021-12-14 ENCOUNTER — HOSPITAL ENCOUNTER (OUTPATIENT)
Dept: PHYSICAL THERAPY | Age: 56
Discharge: HOME | End: 2021-12-14
Attending: PHYSICIAN ASSISTANT
Payer: MEDICARE

## 2021-12-14 PROCEDURE — 97530 THERAPEUTIC ACTIVITIES: CPT

## 2021-12-14 PROCEDURE — 97110 THERAPEUTIC EXERCISES: CPT

## 2021-12-14 PROCEDURE — 97116 GAIT TRAINING THERAPY: CPT

## 2021-12-14 NOTE — PROGRESS NOTES
PT DAILY TREATMENT NOTE    Patient Name: Aiden Zhang  Date:2021  : 1965  [x]  Patient  Verified  Payor: BLUE CROSS MEDICARE / Plan: [facility]  HMO / Product Type: Managed Care Medicare /    In time:745  Out time:843  Total Treatment Time (min): 58  Total Timed Codes (min): 58  1:1 Treatment Time (MC/BCBS only): 62   Visit #: 13 of 19    Treatment Dx: Pain in right hip [M25.551]  Other bursitis of hip, right hip [M70.71]    SUBJECTIVE  Pain Level (0-10 scale): 7  Any medication changes, allergies to medications, adverse drug reactions, diagnosis change, or new procedure performed?: [x] No    [] Yes (see summary sheet for update)  Subjective functional status/changes:   [] No changes reported  Reports no falls over the last week. Overall feeling much better    OBJECTIVE          18 min Therapeutic Exercise:  [x] See flow sheet :   Rationale: increase ROM, increase strength and improve coordination to improve the patients ability to perform ADL    30 min Therapeutic Activity:  [x]  See flow sheet :reevaluation, review of current ex and recommendation to perform certain activities/exercises throughout the day depending on functional position eg supine ex in AM prior to getting up, seated ex at table when sitting down for meals, standing ex when doing work at [employer]. Discussed need to return to therapy in case of regression of condition and with difficulty performing HEP, reviewed safe transfers by completing turn prior to sitting down   Rationale: patient education  to improve the patients ability to manage symptoms       10 min Gait Trainin feet x2  with QC device on level surfaces with SB level of assistance with occasional CGA with imbalance   Rationale: To improve ambulation safety and efficiency in order to improve patient's ability to safely ambulate at home for self care.              With   [x] TE   [] TA   [] neuro   [] other: Patient Education: [x] Review HEP    [] Progressed/Changed HEP based on:   [] positioning   [] body mechanics   [] transfers   [] heat/ice application    [] other:      Other Objective/Functional Measures: FOTO 36  Ambulation 103 feet x2  5xSTS 16.4 sec  Patient has good understanding of HEP       Pain Level (0-10 scale) post treatment: 6    ASSESSMENT/Changes in Function: Reduction of hip pain with ambulation, continues with intermittent imbalance during ambulation and lateralization to right . Patient would benefit from future PT to address residual deficits however has currently reached a plateau and will be discharged to [facility]. Patient is aware that she can return for additional therapy as needed pending a prescription by her MD           [x]  See Discharge Summary         Progress towards goals / Updated goals:  Short Term Goals: STG- To be accomplished in 4 week(s):  1.  Pt will be independent with HEP to encourage prophylaxis. Eval: HEP dispensed   Current: 12/14/21: daily ex routine, increased walking, increased ease with all transfers, progressing     2.  Pt will be able to stand for > 10 minutes at home with pain </=5/10 in right hip to demonstrate improved loading on right LE  Eval: currently unable to stand due to right hip pain  Current:12/14/21: patient reports increased ease standing > 10 min for doing dishes, some dusting, pain continues to fluctuate up to 7/10 max, progressing      Long Term Goals: LTG- To be accomplished in 8 week(s):  1.  Pt will report 1 or no falls per week to indicate improved safety with SPT transfers and return to PLOF  Eval: falling everyday with SPT  Last PN 11/18/21: patient reports one fall in bathroom last week, progressing overall  Status on 12/14/21: no falls over the last week, MET     2.  Pt will be able to complete 5x sit to stand from 17in in < 20 seconds with demonstrating safe technique to indicate improved functional LE strength to decrease burden with transfers at home  Eval:25.5sec from 22in without UE assist, cues to lean forward, decreased WB on left LE, pushing off on back of table  Current:12/14/21: 22 in treatment table, using right hand to push off, 16.4 sec indicating improvement in functional mobility, MET      3.  Pt will be able to walk 50ft with SPC with pain </=2/10 in right hip to be able to ambulate short distances in home with increased ease  Eval:unable to walk due to pain in right hip, currenlty using manual wc or transport chair to get around    Last PN 11/23/21: patient demonstrates ability to walk 70 feet with SPC during previous visit however reports fluctuating  Pain, current pain level 4/10 with ambulation at end of session, progressing  Current:12/14/21: right hip 7/10 prior to walking, demonstrates ability to walk 103 feet with quad cane/SBA before need of a rest period  and reports reduction of pain to 5/10 with ambulation, progressing      4.  Pt FOTO score will increase by >/= 18 points to show improvement in subjective function. Eval:28  Last PN 41 progressing 11/22/21  Current: 12/14/21: FOTO 36/100, slight regression since last testing , overall progressing  *FOTO score is an established functional score where 100 = no disability*     Updated Goals: to be achieved in 4 weeks:              LTG 5.  Patient demonstrates ability to walk >or= 100 feet with SPC with pain <or= 5/10 for increased ease with household ambulation and limited community ambulation  Last PN : patient able to walk 70 feet with fluctuating pain levels 6-10/10  Current:12/14/21: right hip 7/10 prior to walking, demonstrates ability to walk 103 feet with quad cane and SBA and reports reduction of pain to 5/10 with ambulation, progressing         PLAN      [x]  Discharge due to:patient having reached multiple goals but reaching plateau in regards to right hip pain levels, patient to independently work on HEP _  []  Other:_      Brittney Barrett, PT 12/14/2021  7:50 AM    Future Appointments   Date Time Provider [facility] [place]   12/15/2021  7:45 AM Alden Homans, PT, DPT [facility]   12/20/2021  7:45 AM ELMO Garcia [facility]   12/21/2021  7:45 AM ELMO Garcia [facility]   12/29/2021  7:45 AM Alden Homans, PT, DPT [facility]

## 2021-12-14 NOTE — TELEPHONE ENCOUNTER
PA is required for Lidocaine patch    [facility] [employer] [facility]:  Mariam Tariq López:  [id number] - PA Case ID: [id number] - Rx #: [id number]

## 2021-12-14 NOTE — PROGRESS NOTES
[facility] at the [address], [facility], [address] [facility]  Phone: [phone]      Fax:  [phone]            Discharge Summary    Patient name: Emilia Bynum Bark of Care: 21   Referral source: Rachel Castillo PA-C : 1965               Medical Diagnosis: Greater trochanteric bursitis of right hip [M70.61]    Onset Date:2021               Treatment Diagnosis: right hip pain    Prior Hospitalization: see medical history Provider#: [id number]   Medications: Verified on Patient summary List    Comorbidities: [place] 7 years ago with affecting left side, spinia bifidia, depression, left RENATO  with revision , multiple shunt sx   Prior Level of Function: walking with SPC around home with using manual wc at times and transport chair out of the house, only falling every once in a while not everyday, mod I with ADLs,  not having to assist with transfers                                  Comorbidities: spina bifida, CVA  Prior Level of Function:gait dysfunction, frequent daily falls    Visits from Start of Care: 13    Missed Visits: 2    Reporting Period : 21 to 21    Progress towards goals / Updated goals:  Short Term Goals: STG- To be accomplished in 4 week(s):  1.  Pt will be independent with HEP to encourage prophylaxis. Eval: HEP dispensed   Current: 21: daily ex routine, increased walking, increased ease with all transfers, progressing     2.  Pt will be able to stand for > 10 minutes at home with pain </=5/10 in right hip to demonstrate improved loading on right LE  Eval: currently unable to stand due to right hip pain  Current:21: patient reports increased ease standing > 10 min for doing dishes, some dusting, pain continues to fluctuate up to 7/10 max, progressing      Long Term Goals: LTG- To be accomplished in 8 week(s):  1.  Pt will report 1 or no falls per week to indicate improved safety with SPT transfers and return to PLOF  Eval: falling everyday with SPT  Last PN 11/18/21: patient reports one fall in bathroom last week, progressing overall  Status on 12/14/21: no falls over the last week, MET     2.  Pt will be able to complete 5x sit to stand from 17in in < 20 seconds with demonstrating safe technique to indicate improved functional LE strength to decrease burden with transfers at home  Eval:25.5sec from 22in without UE assist, cues to lean forward, decreased WB on left LE, pushing off on back of table  Current:12/14/21: 22 in treatment table, using right hand to push off, 16.4 sec indicating improvement in functional mobility, MET      3.  Pt will be able to walk 50ft with SPC with pain </=2/10 in right hip to be able to ambulate short distances in home with increased ease  Eval:unable to walk due to pain in right hip, currenlty using manual wc or transport chair to get around    Last PN 11/23/21: patient demonstrates ability to walk 70 feet with SPC during previous visit however reports fluctuating  Pain, current pain level 4/10 with ambulation at end of session, progressing  Current:12/14/21: right hip 7/10 prior to walking, demonstrates ability to walk 103 feet with quad cane/SBA before need of a rest period  and reports reduction of pain to 5/10 with ambulation, progressing      4.  Pt FOTO score will increase by >/= 18 points to show improvement in subjective function. Eval:28  Last PN 41 progressing 11/22/21  Current: 12/14/21: FOTO 36/100, slight regression since last testing , overall progressing  *FOTO score is an established functional score where 100 = no disability*     Updated Goals: to be achieved in 4 weeks:              LTG 5.  Patient demonstrates ability to walk >or= 100 feet with SPC with pain <or= 5/10 for increased ease with household ambulation and limited community ambulation  Last PN : patient able to walk 70 feet with fluctuating pain levels 6-10/10  Current:12/14/21: right hip 7/10 prior to walking, demonstrates ability to walk 103 feet with quad cane and SBA and reports reduction of pain to 5/10 with ambulation, progressing           Assessment/ Summary of Care: Mrs. Сергей Navarro has attended a total of 13 visits including initial evaluation for general strengthening, balance, gait training and postural alignment. She has shown nice improvement in regards to general mobility (5xSTS test), walking tolerances with AD, reduction in falls and ease with transfers however continues with deficits in regards to fluctuating pain levels right hip, lateralization to right , non functional left arm and static/dynamic balance. Patient would benefit from additional therapy in the future as needed however I recommend discharge to [facility] at this time as patient has met multiple goals and is showing plateau in regards to remaining goals (FOTO, pain levels).      RECOMMENDATIONS:  [x]Discontinue therapy: [x]Patient has reached or is progressing toward set goals      []Patient is non-compliant or has abdicated      []Due to lack of appreciable progress towards set goals    Deo Rai, PT 12/14/2021 8:56 AM

## 2021-12-15 ENCOUNTER — APPOINTMENT (OUTPATIENT)
Dept: PHYSICAL THERAPY | Age: 56
End: 2021-12-15
Attending: PHYSICIAN ASSISTANT
Payer: MEDICARE

## 2021-12-15 NOTE — TELEPHONE ENCOUNTER
Pt bought 4% Lidocaine patches OTC at this time but will get the Aspercream as a back up if the patches doesn't work. Pt thankful for the call and information.

## 2021-12-20 ENCOUNTER — APPOINTMENT (OUTPATIENT)
Dept: PHYSICAL THERAPY | Age: 56
End: 2021-12-20
Attending: PHYSICIAN ASSISTANT
Payer: MEDICARE

## 2021-12-21 ENCOUNTER — APPOINTMENT (OUTPATIENT)
Dept: PHYSICAL THERAPY | Age: 56
End: 2021-12-21
Attending: PHYSICIAN ASSISTANT
Payer: MEDICARE

## 2021-12-29 ENCOUNTER — APPOINTMENT (OUTPATIENT)
Dept: PHYSICAL THERAPY | Age: 56
End: 2021-12-29
Attending: PHYSICIAN ASSISTANT
Payer: MEDICARE

## 2022-03-18 PROBLEM — Z87.440 HISTORY OF UTI: Status: ACTIVE | Noted: 2018-06-28

## 2022-03-18 PROBLEM — N39.0 RECURRENT UTI: Status: ACTIVE | Noted: 2018-07-03

## 2022-03-19 PROBLEM — N31.9 NEUROGENIC BLADDER: Status: ACTIVE | Noted: 2018-06-28

## 2022-03-19 PROBLEM — Q05.4 SPINA BIFIDA WITH HYDROCEPHALUS (HCC): Status: ACTIVE | Noted: 2018-06-28

## 2022-03-19 PROBLEM — Z78.9 SELF-CATHETERIZES URINARY BLADDER: Status: ACTIVE | Noted: 2018-07-03

## 2022-03-19 PROBLEM — N39.41 URGE INCONTINENCE: Status: ACTIVE | Noted: 2018-07-03

## 2022-03-20 PROBLEM — K31.84 GASTROPARESIS: Status: ACTIVE | Noted: 2018-06-28

## 2022-05-02 ENCOUNTER — HOSPITAL ENCOUNTER (OUTPATIENT)
Dept: PHYSICAL THERAPY | Age: 57
End: 2022-05-02

## 2022-06-01 ENCOUNTER — HOSPITAL ENCOUNTER (OUTPATIENT)
Dept: PHYSICAL THERAPY | Age: 57
Discharge: HOME | End: 2022-06-01
Payer: MEDICARE

## 2022-06-01 PROCEDURE — 97162 PT EVAL MOD COMPLEX 30 MIN: CPT

## 2022-06-01 NOTE — PROGRESS NOTES
[facility] at the [address], [place], [address] [facility]  Phone: [phone]      Fax:  [phone]             Plan of Care/ Statement of Necessity for Physical Therapy Services      Patient name: Matthew Valladares Start of Care: 2022   Referral source: Claude West MD : 1965    Medical Diagnosis: Right hip pain [M25.551]  Left hip pain [M25.552]   Onset Date: 22    Treatment Diagnosis: Bilateral hip pain, low back pain   Prior Hospitalization: see medical history Provider#: [id number]   Medications: Verified on Patient summary List    Comorbidities: Spina bifida, Stroke when she was 7, Depression, arthritis, Left RENATO in , revision    Prior Level of Function: Pt mainly wc bound but was able to walk  ft with SPC. Independent with WC. The Plan of Care and following information is based on the information from the initial evaluation. Assessment/ key information: 63 yo female who [facility] with c/o bilateral hip pain and low back pain. Past medical history is significant for spina bifida and left sided stroke. Pt reports she had this pain before that went away after physical therapy. The pain began again in 2022. Reports pain in bilateral hips (Right worse than left) and low back. Report she started falling a lot more than usual. Reports multiple falls per week. X-rays showed nothing of note, with MD speculating bursitis. Pt uses wc primarily for mobility and SPC for short distance walking. She reports she has a rollator walker but doesn't use it because it makes her fall. Pain is 7/10 at best with tylenol/ibuprophen; 10/10 at worst with standing up; pt reports they are not able to sleep through the night secondary to pain. Wakes up a few times a week. Patient with poor prolonged posturing which may contributing to her pain.  Patient demonstrates decreased ROM, decreased strength, impaired posture, impaired gait mechancis, pain and decreased functional mobility tolerance.     Patient will continue to benefit from skilled PT services to modify and progress therapeutic interventions, address functional mobility deficits, address ROM deficits, address strength deficits, analyze and address soft tissue restrictions, analyze and cue movement patterns, analyze and modify body mechanics/ergonomics, assess and modify postural abnormalities, address imbalance/dizziness and instruct in home and community integration to attain remaining goals. Evaluation Complexity History HIGH Complexity :3+ comorbidities / personal factors will impact the outcome/ POC ; Examination HIGH Complexity : 4+ Standardized tests and measures addressing body structure, function, activity limitation and / or participation in recreation  ;Presentation MEDIUM Complexity : Evolving with changing characteristics  ; Clinical Decision Making MEDIUM Complexity : FOTO score of 26-74 FOTO score = an established functional score where 100 = no disability  Overall Complexity Rating: MEDIUM  Problem List: pain affecting function, decrease ROM, decrease strength, impaired gait/ balance, decrease ADL/ functional abilitiies, decrease activity tolerance, decrease flexibility/ joint mobility and decrease transfer abilities   Treatment Plan may include any combination of the following: Therapeutic exercise, Therapeutic activities, Neuromuscular re-education, Physical agent/modality, Gait/balance training, Manual therapy, Patient education, Self Care training, Functional mobility training and Home safety training  Vasopnuematic compression justification:  Per bilateral girth measures taken and listed above the edema is considered significant and having an impact on the patient's self care and ADLs  Patient / Family readiness to learn indicated by: asking questions and trying to perform skills  Persons(s) to be included in education: family support person (FSP);list   Barriers to Learning/Limitations: yes;  physical  Patient Goal (s): lessen pain and improve balance  Patient Self Reported Health Status: fair  Rehabilitation Potential: good    Short Term Goals: To be accomplished in 4 weeks:  1. Patient will be independent and compliant with HEP to progress toward goals and restore functional mobility. Eval Status: issued at eval     2. Patient will improve FOTO score by 5 points to improve functional tolerance for exercise. Eval Status: FOTO 36  FOTO score = an established functional score where 100 = no disability     3. Patient will improve pain in bilateral hips to 5/10  to improve standing tolerance and restore prior level of function. Eval Status: 10/10 at worst     4. Pt will increase MMT of hip strength by 1/5 in order to return to goals of improved walking. Eval Status:   Hip Left (1-5) Right (1-5)   Hip Flexion 2- 3+   Hip Extension NT NT   Hip ABD 2 3-   Hip ADD 3- 4-   Hip ER NT NT   Hip IR NT NT         5. Pt will have 120 degrees of passive pain free hip flexion to aid in functional mechanics for ambulation/ADLs. Eval Status:   AROM                             PROM  Hip Left Right Left Right   Flexion 115 ! (propped) 105 ! 120 ! 110 ! ER 10 ! 20 ! 22 ! 20 ! IR 0 ! 40 ! 25 ! 40 !         Long Term Goals: To be accomplished in 6 weeks:  1. Patient will improve FOTO score by 11 points to improve functional tolerance for ADLs. Eval Status: FOTO 36  FOTO score = an established functional score where 100 = no disability     2. Pt will be able to transfer sit to supine and supine to sit with no assistance to aid in functional mechanics for bed mobility. Eval Status: Sit-Supine: Min A for left LE              Supine-Sit: min A for left LE management     3. Pt will ambulate 250 ft with SPC and no LOB in order to be a safe community ambulator. Eval Status: Pt able to ambulate 10 ft with SPC, decreased step height and length on left LE.     4. Patient will improve pain in low back to 0/10 at worst to improve ADL tolerance and restore prior level of function. Eval Status: 10/10 at worst      Frequency / Duration: Patient to be seen 2 times per week for 6 weeks. Patient/ Caregiver education and instruction: Diagnosis, prognosis, activity modification and other Sleeping position   [x]  Plan of care has been reviewed with PTA    Certification Period: 6/1/22 to 8/30/22    Faizan Bell, PT 6/1/2022 12:06 PM  _____________________________________________________________________  I certify that the above Therapy Services are being furnished while the patient is under my care. I agree with the treatment plan and certify that this therapy is necessary.     [de-identified] Signature:____________Date:_________TIME:________                                      Osman Levin MD    ** Signature, Date and Time must be completed for valid certification **    Please sign and return to [facility] at the [address], [address] [facility]       Phone: [phone]      Fax:  [phone]

## 2022-06-01 NOTE — PROGRESS NOTES
PT DAILY TREATMENT NOTE/Hip/Knee/Ankle EVAL 10-18    Patient Name: Serjio Hoffman  Date:2022  : 1965  [x]  Patient  Verified  Payor: BLUE CROSS MEDICARE / Plan: Katrina Thapa [id number] HMO / Product Type: Managed Care Medicare /    In time:8:33  Out time:9:11  Total Treatment Time (min): 34  Visit #: 1 of 12    Medicare/BCBS Only   Total Timed Codes (min):  0 1:1 Treatment Time:  34       Treatment Area: Right hip pain [M25.551]  Left hip pain [M25.552]      SUBJECTIVE  Pain Level (0-10 scale): 8/10  []constant []intermittent []improving [x]worsening []no change since onset    Any medication changes, allergies to medications, adverse drug reactions, diagnosis change, or new procedure performed?: [x] No    [] Yes (see summary sheet for update)  Subjective functional status/changes:     PLOF: Pt mainly wc bound but was able to walk  ft with SPC. Independent with WC. Limitations to PLOF: pain, weakness, poor posturing  Mechanism of Injury: Pt reports she had pain before that went away after physical therapy. The pain began again in 2022. Reports pain in bilateral hips (Right worse than left) and low back. Report she started falling a lot more than usual. Reports multiple falls per week. X-rays showed nothing of note, with MD speculating bursitis. Pt uses wc primarily for mobility and SPC for short distance walking. She reports she has a rollator walker but doesn't use it because it makes her fall. Current symptoms/Complaints: 7/10 at best with tylenol/ibuprophen; 10/10 at worst with standing up; pt reports they are not able to sleep through the night secondary to pain. Wakes up a few times a week.    Previous Treatment/Compliance: has had previous therapy for this condition prior with good results  PMHx/Surgical Hx: Spina bifida, Stroke when she was 7, Depression, arthritis, Left RENATO in , revision   Work Hx: not working  Living Situation: wc accessible  Pt Goals: Patrisha Habermann pain and improve balance\"  Barriers: [x]pain []financial []time []transportation []other  Motivation: good  Substance use: []Alcohol []Tobacco []other:   Cognition: A & O x 3        OBJECTIVE    34 min [x]Eval                  []Re-Eval             With   [] TE   [] TA   [] neuro   [] other: Patient Education: [x] Review HEP    [] Progressed/Changed HEP based on:   [] positioning   [] body mechanics   [] transfers   [] heat/ice application    [] other:        General Evaluation    Gait: Decreased lift of left LE, decreased foot clearance, heavy use of SPC, bilateral AFOs  Stairs:NT  Posture: Left lateral shift, right trunk rotation, Pt with IR left hip  Palpation/Sensation: TTP lateral hip bursae, IT band, lumbar paraspinals, glutes, piriformis. ROM:                                         AROM                           PROM  Hip Left Right Left Right   Flexion 115 ! (propped) 105 ! 120 ! 110 ! ER 10 ! 20 ! 22 ! 20 ! IR 0 ! 40 ! 25 ! 40 ! Strength (MMT):                                            Hip Left (1-5) Right (1-5)   Hip Flexion 2- 3+   Hip Extension NT NT   Hip ABD 2 3-   Hip ADD 3- 4-   Hip ER NT NT   Hip IR NT NT     Knee Left (1-5) Right (1-5)   Knee Flexion 2- 4   Knee Extension 3- 5   Ankle PF NT NT   Ankle DF NT NT   Other       Functional Mobility      Bed Mobility:         Rolling: Increased time needed, CGA for safety       Sit-Supine: Min A for left LE   Supine-Sit: min A for left LE management      Transfers:       Sit-Stand: Mod I      Gait: see above    Special Tests:    Hip Left Right   Vincent Test     Obers     [place] + +   [place] + +            Other Tests / Comments: Pain worse when moving into IR       Pain Level (0-10 scale) post treatment: 9/10    ASSESSMENT/Changes in Function: 65 yo female who presents to [facility] with c/o bilateral hip pain and low back pain. Past medical history is significant for spina bifida and left sided stroke.  Pt reports she had this pain before that went away after physical therapy. The pain began again in February 2022. Reports pain in bilateral hips (Right worse than left) and low back. Report she started falling a lot more than usual. Reports multiple falls per week. X-rays showed nothing of note, with MD speculating bursitis. Pt uses wc primarily for mobility and SPC for short distance walking. She reports she has a rollator walker but doesn't use it because it makes her fall. Pain is 7/10 at best with tylenol/ibuprophen; 10/10 at worst with standing up; pt reports they are not able to sleep through the night secondary to pain. Wakes up a few times a week. Patient with poor prolonged posturing which may contributing to her pain. Patient demonstrates decreased ROM, decreased strength, impaired posture, impaired gait mechancis, pain and decreased functional mobility tolerance. Patient will continue to benefit from skilled PT services to modify and progress therapeutic interventions, address functional mobility deficits, address ROM deficits, address strength deficits, analyze and address soft tissue restrictions, analyze and cue movement patterns, analyze and modify body mechanics/ergonomics, assess and modify postural abnormalities, address imbalance/dizziness and instruct in home and community integration to attain remaining goals. [x]  See Plan of Care  []  See progress note/recertification  []  See Discharge Summary         Progress towards goals / Updated goals:  Short Term Goals: To be accomplished in 4 weeks:  1. Patient will be independent and compliant with HEP to progress toward goals and restore functional mobility. Eval Status: issued at eval    2. Patient will improve FOTO score by 5 points to improve functional tolerance for exercise. Eval Status: FOTO 36  FOTO score = an established functional score where 100 = no disability    3.  Patient will improve pain in bilateral hips to 5/10  to improve standing tolerance and restore prior level of function. Eval Status: 10/10 at worst    4. Pt will increase MMT of hip strength by 1/5 in order to return to goals of improved walking. Eval Status:   Hip Left (1-5) Right (1-5)   Hip Flexion 2- 3+   Hip Extension NT NT   Hip ABD 2 3-   Hip ADD 3- 4-   Hip ER NT NT   Hip IR NT NT       5. Pt will have 120 degrees of passive pain free hip flexion to aid in functional mechanics for ambulation/ADLs. Eval Status:   AROM                           PROM  Hip Left Right Left Right   Flexion 115 ! (propped) 105 ! 120 ! 110 ! ER 10 ! 20 ! 22 ! 20 ! IR 0 ! 40 ! 25 ! 40 ! Long Term Goals: To be accomplished in 6 weeks:  1. Patient will improve FOTO score by 11 points to improve functional tolerance for ADLs. Eval Status: FOTO 36  FOTO score = an established functional score where 100 = no disability    2. Pt will be able to transfer sit to supine and supine to sit with no assistance to aid in functional mechanics for bed mobility. Eval Status: Sit-Supine: Min A for left LE   Supine-Sit: min A for left LE management    3. Pt will ambulate 250 ft with SPC and no LOB in order to be a safe community ambulator. Eval Status: Pt able to ambulate 10 ft with SPC, decreased step height and length on left LE. 4.   Patient will improve pain in low back to 0/10 at worst to improve ADL tolerance and restore prior level of function.   Eval Status: 10/10 at worst    PLAN  [x]  Upgrade activities as tolerated     [x]  Continue plan of care  [x]  Update interventions per flow sheet       []  Discharge due to:_  []  Other:_      Mervin Kerr PT 6/1/2022  8:26 AM

## 2022-06-09 ENCOUNTER — HOSPITAL ENCOUNTER (OUTPATIENT)
Dept: PHYSICAL THERAPY | Age: 57
Discharge: HOME | End: 2022-06-09
Payer: MEDICARE

## 2022-06-09 PROCEDURE — 97530 THERAPEUTIC ACTIVITIES: CPT

## 2022-06-09 PROCEDURE — 97140 MANUAL THERAPY 1/> REGIONS: CPT

## 2022-06-09 NOTE — PROGRESS NOTES
PT DAILY TREATMENT NOTE    Patient Name: Melissa Mehta  Date:2022  : 1965  [x]  Patient  Verified  Payor: [place] [place] / Plan: Katrina Thapa [id number] HMO / Product Type: Managed Care Medicare /    In time:7:52  Out time:8:35  Total Treatment Time (min): 43  Total Timed Codes (min): 43  1:1 Treatment Time (MC/BCBS only): 43   Visit #: 2 of 12    Treatment Dx: Right hip pain [M25.551]  Left hip pain [M25.552]    SUBJECTIVE  Pain Level (0-10 scale): 6  Any medication changes, allergies to medications, adverse drug reactions, diagnosis change, or new procedure performed?: [x] No    [] Yes (see summary sheet for update)  Subjective functional status/changes:   [] No changes reported  Pt reports she has been trying to lay on her stomach but lying on her right side is just too painful. OBJECTIVE    23 min Therapeutic Activity:  [x]  See flow sheet :   Rationale: increase ROM, increase strength, improve coordination and functional mobility  to improve the patients ability to tolerate full therapy session without significant fatigue. 20 min Manual Therapy:  left hip flexion, IR, and ER stretch/mobilization, right pelvic depression with active distraction reach,  seated right trunk rotation mobilization with movement. Rationale: decrease pain, increase ROM and increase postural awareness to improve the patients ability to tolerate therapy session. The manual therapy interventions were performed at a separate and distinct time from the therapeutic activities interventions. With   [] TE   [] TA   [] neuro   [] other: Patient Education: [x] Review HEP    [] Progressed/Changed HEP based on:   [] positioning   [] body mechanics   [] transfers   [] heat/ice application    [] other:      Other Objective/Functional Measures: Shortened right obliques, posterior left pelvis.       Pain Level (0-10 scale) post treatment: 6    ASSESSMENT/Changes in Function: Pt with fair tolerance to today's session. Pt reported significant fatigue at the end of the session. Pt reported slight decrease in pain with right hip depression/trunk elongation exercise (lady in glasses). Pt with good tolerance to addition of seated LAQ in order to improve hamstring flexibility and lumbar sacral mobility. Pt is making slow progress towards their goals. Pt will continue to benefit from skilled therapeutic intervention at this time to address th remaining deficits as discussed in goals below. Patient will continue to benefit from skilled PT services to modify and progress therapeutic interventions, address functional mobility deficits, address ROM deficits, address strength deficits, analyze and address soft tissue restrictions, analyze and cue movement patterns, analyze and modify body mechanics/ergonomics, assess and modify postural abnormalities, address imbalance/dizziness and instruct in home and community integration to attain remaining goals. [x]  See Plan of Care  []  See progress note/recertification  []  See Discharge Summary         Progress towards goals / Updated goals:  Short Term Goals: To be accomplished in 4 weeks:  1. Patient will be independent and compliant with HEP to progress toward goals and restore functional mobility. Eval Status: issued at [facility]  Current: 6/9/22 Reviewed and addition of LAQ, glute set     2. Patient will improve FOTO score by 5 points to improve functional tolerance for exercise. Eval Status: FOTO 36  FOTO score = an established functional score where 100 = no disability     3. Patient will improve pain in bilateral hips to 5/10  to improve standing tolerance and restore prior level of function. Eval Status: 10/10 at worst     4. Pt will increase MMT of hip strength by 1/5 in order to return to goals of improved walking.   Eval Status:   Hip Left (1-5) Right (1-5)   Hip Flexion 2- 3+   Hip Extension NT NT   Hip ABD 2 3-   Hip ADD 3- 4-   Hip ER NT NT   Hip IR NT NT       5. Pt will have 120 degrees of passive pain free hip flexion to aid in functional mechanics for ambulation/ADLs. Eval Status:   AROM                             PROM  Hip Left Right Left Right   Flexion 115 ! (propped) 105 ! 120 ! 110 ! ER 10 ! 20 ! 22 ! 20 !   IR 0 ! 40 ! 25 ! 40 !         Long Term Goals: To be accomplished in 6 weeks:  1. Patient will improve FOTO score by 11 points to improve functional tolerance for ADLs. Eval Status: FOTO 36  FOTO score = an established functional score where 100 = no disability     2.   Pt will be able to transfer sit to supine and supine to sit with no assistance to aid in functional mechanics for bed mobility. Eval Status: Sit-Supine: Min A for left LE              Supine-Sit: min A for left LE management  Current: 6/9/22 min A for rolling and transfers     3.   Pt will ambulate 250 ft with SPC and no LOB in order to be a safe community ambulator. Eval Status: Pt able to ambulate 10 ft with SPC, decreased step height and length on left LE.     4.   Patient will improve pain in low back to 0/10 at worst to improve ADL tolerance and restore prior level of function.   Eval Status: 10/10 at worst        PLAN  []  Upgrade activities as tolerated     [x]  Continue plan of care  []  Update interventions per flow sheet       []  Discharge due to:_  []  Other:_      Kortney Westbrook PT 6/9/2022  7:34 AM    Future Appointments   Date Time Provider Marty Jj   6/9/2022  7:45 AM [facility]   6/15/2022  8:30 AM [facility]   6/21/2022  7:45 AM [facility]l Ronnie, [facility]   6/23/2022  7:45 AM Kathy Ascencio, PT [facility]   6/29/2022  8:30 AM Welfgold Sonu, PT [facility]   6/30/2022  7:45 AM Welfgold Ascencio, PT [facility]   7/5/2022  7:45 AM Derryl Ronnie, PT [facility]   7/7/2022  7:45 AM Kathy Ascencio PT [facility]   7/12/2022  7:45 AM Francisco J Trujillo, PT [facility]   7/14/2022  7:45 AM Kathy Ascencio, PT [facility]   7/26/2022  7:45 AM ELMO Craft [facility]   7/28/2022  5:15 PM ELMO Craft [facility]

## 2022-06-15 ENCOUNTER — HOSPITAL ENCOUNTER (OUTPATIENT)
Dept: PHYSICAL THERAPY | Age: 57
Discharge: HOME | End: 2022-06-15
Payer: MEDICARE

## 2022-06-15 PROCEDURE — 97112 NEUROMUSCULAR REEDUCATION: CPT

## 2022-06-15 PROCEDURE — 97530 THERAPEUTIC ACTIVITIES: CPT

## 2022-06-15 PROCEDURE — 97110 THERAPEUTIC EXERCISES: CPT

## 2022-06-15 NOTE — PROGRESS NOTES
PT DAILY TREATMENT NOTE    Patient Name: Harmeet Allred  Date:6/15/2022  : 1965  [x]  Patient  Verified  Payor: BLUE CROSS MEDICARE / Plan: Katrina Thapa 8141 HMO / Product Type: Managed Care Medicare /    In time:8:32  Out time:9:15  Total Treatment Time (min): 43  Total Timed Codes (min): 43  1:1 Treatment Time (MC/BCBS only): 37   Visit #: 3 of 12    Treatment Dx: Right hip pain [M25.551]  Left hip pain [M25.552]    SUBJECTIVE  Pain Level (0-10 scale): 8  Any medication changes, allergies to medications, adverse drug reactions, diagnosis change, or new procedure performed?: [x] No    [] Yes (see summary sheet for update)  Subjective functional status/changes:   [] No changes reported  Reports minimal compliance with HEP    OBJECTIVE    10 min Therapeutic Exercise:  [x] See flow sheet :   Rationale: increase ROM and increase strength to improve the patients ability to improve functional mobility and independence    10 min Therapeutic Activity:  [x]  See flow sheet :   Rationale: increase ROM, increase strength and improve coordination  to improve the patients ability to improve performance of ADLs     23 min Neuromuscular Re-education:  [x]  See flow sheet :   Rationale: increase ROM, increase strength, improve coordination and increase proprioception  to improve the patients ability to maintain proper spinal alignment during functional activities. With   [] TE   [] TA   [] neuro   [] other: Patient Education: [x] Review HEP    [] Progressed/Changed HEP based on:   [] positioning   [] body mechanics   [] transfers   [] heat/ice application    [] other:      Pain Level (0-10 scale) post treatment: 7    ASSESSMENT/Changes in Function: Pt with fair tolerance to today's session fatigueing easily. Pt reported decreased symptoms during performance of bent knee fall outs on right side.  Pt with good tolerance to addition of sit to  order to improve weight bearing through left leg. Pt is making slow progress towards their goals. Pt will continue to benefit from skilled therapeutic intervention at this time to address th remaining deficits as discussed in goals below. Patient will continue to benefit from skilled PT services to modify and progress therapeutic interventions, address functional mobility deficits, address ROM deficits, address strength deficits, analyze and address soft tissue restrictions, analyze and cue movement patterns, analyze and modify body mechanics/ergonomics, assess and modify postural abnormalities and address imbalance/dizziness to attain remaining goals. [x]  See Plan of Care  []  See progress note/recertification  []  See Discharge Summary         Progress towards goals / Updated goals:  Short Term Goals: To be accomplished in 4 weeks:  1. Patient will be independent and compliant with HEP to progress toward goals and restore functional mobility. Eval Status: issued at eval  Current: 6/15/22 Pt reports minimal compliance with HEP.     2. Patient will improve FOTO score by 5 points to improve functional tolerance for exercise. Eval Status: FOTO 36  FOTO score = an established functional score where 100 = no disability     3. Patient will improve pain in bilateral hips to 5/10  to improve standing tolerance and restore prior level of function. Eval Status: 10/10 at worst     4. Pt will increase MMT of hip strength by 1/5 in order to return to goals of improved walking. Eval Status:   Hip Left (1-5) Right (1-5)   Hip Flexion 2- 3+   Hip Extension NT NT   Hip ABD 2 3-   Hip ADD 3- 4-   Hip ER NT NT   Hip IR NT NT         5. Pt will have 120 degrees of passive pain free hip flexion to aid in functional mechanics for ambulation/ADLs. Eval Status:   AROM                             PROM  Hip Left Right Left Right   Flexion 115 ! (propped) 105 ! 120 ! 110 !    ER 10 ! 20 ! 22 ! 20 !   IR 0 ! 40 ! 25 ! 40 !         Long Term Goals: To be accomplished in 6 weeks:  1. Patient will improve FOTO score by 11 points to improve functional tolerance for ADLs. Eval Status: FOTO 36  FOTO score = an established functional score where 100 = no disability     2.   Pt will be able to transfer sit to supine and supine to sit with no assistance to aid in functional mechanics for bed mobility. Eval Status: Sit-Supine: Min A for left LE              Supine-Sit: min A for left LE management  Current: 6/10/22 pt min A for sit to supine, independent with supine to sit.     3.   Pt will ambulate 250 ft with SPC and no LOB in order to be a safe community ambulator. Eval Status: Pt able to ambulate 10 ft with SPC, decreased step height and length on left LE.     4.   Patient will improve pain in low back to 0/10 at worst to improve ADL tolerance and restore prior level of function.   Eval Status: 10/10 at worst    PLAN  []  Upgrade activities as tolerated     [x]  Continue plan of care  []  Update interventions per flow sheet       []  Discharge due to:_  []  Other:_      Mervin Kerr PT 6/15/2022  8:19 AM    Future Appointments   Date Time Provider Marty Jj   6/15/2022  8:30 AM Claudy Lopes PT [facility]   6/21/2022  7:45 AM Sheng Odell PT [facility]   6/23/2022  7:45 AM Claudy Lopes PT [facility]   6/29/2022  8:30 AM Claudy Lopes PT [facility]   6/30/2022  7:45 AM Claudy Lopes PT [facility]   7/5/2022  7:45 AM Sheng Odell PT [facility]   7/7/2022  7:45 AM Claudy Lopes PT [facility]   7/12/2022  7:45 AM Sheng Odell PT [facility]   7/14/2022  7:45 AM Claudy Lopes PT [facility]   7/26/2022  7:45 AM ELMO Villalba [facility]   7/28/2022  5:15 PM ELMO Villalba [facility]

## 2022-06-21 ENCOUNTER — HOSPITAL ENCOUNTER (OUTPATIENT)
Dept: PHYSICAL THERAPY | Age: 57
Discharge: HOME | End: 2022-06-21
Payer: MEDICARE

## 2022-06-21 PROCEDURE — 97116 GAIT TRAINING THERAPY: CPT

## 2022-06-21 PROCEDURE — 97112 NEUROMUSCULAR REEDUCATION: CPT

## 2022-06-21 PROCEDURE — 97110 THERAPEUTIC EXERCISES: CPT

## 2022-06-21 NOTE — PROGRESS NOTES
PT DAILY TREATMENT NOTE    Patient Name: Adam Benjamin  Date:2022  : 1965  [x]  Patient  Verified  Payor: BLUE CROSS MEDICARE / Plan: Parvbethanie Elier [id number] HMO / Product Type: Managed Care Medicare /    In time:751  Out time:833  Total Treatment Time (min): 42  Total Timed Codes (min): 42  1:1 Treatment Time (MC/BCBS only): 42   Visit #: 4 of 12    Treatment Dx: Right hip pain [M25.551]  Left hip pain [M25.552]    SUBJECTIVE  Pain Level (0-10 scale): 8 both hips  Any medication changes, allergies to medications, adverse drug reactions, diagnosis change, or new procedure performed?: [x] No    [] Yes (see summary sheet for update)  Subjective functional status/changes:   [] No changes reported  Reports deterioration in strength and function since d/c from therapy in . Increased hip pain. Fall last weekend causing bruising left lateral thigh /hip  \"My balance is really bad this morning\"    OBJECTIVE      15 min Therapeutic Exercise:  [x] See flow sheet :   Rationale: increase ROM, increase strength and improve coordination to improve the patients ability to perform ADL       17 min Neuromuscular Re-education:  [x]  See flow sheet :worked on sitting alignment and hip shift   Rationale: increase ROM, increase strength and improve coordination  to improve the patients ability to perform ADL      10 min Gait Trainin feet x4 with in parallel bars on level surfaces with min level of assistance and using right arm, also ambulation from parallel bars to treatment table with QC  (40 ft) with CGA   Rationale: To improve ambulation safety and efficiency in order to improve patient's ability to safely ambulate at home for self care.              With   [x] TE   [] TA   [] neuro   [] other: Patient Education: [x] Review HEP    [] Progressed/Changed HEP based on:   [] positioning   [] body mechanics   [] transfers   [] heat/ice application    [] other:      Other Objective/Functional Measures: Patient 10 min late  Difficulty stabilizing left knee during WS in parallel bars  Holding WBOS standing for 10 sec max without external support  Needs assist to perform left knee Flex with slider, able to extend without assist (seated)  Marked lateralization to left in seated position     Pain Level (0-10 scale) post treatment: 7    ASSESSMENT/Changes in Function: Reports hips feeling better after doing PT session, good tolerance to ambulation    Patient will continue to benefit from skilled PT services to address ROM deficits, address strength deficits, analyze and address soft tissue restrictions, analyze and cue movement patterns, assess and modify postural abnormalities and imbalance to attain remaining goals. [x]  See Plan of Care  []  See progress note/recertification  []  See Discharge Summary         Progress towards goals / Updated goals:  Short Term Goals: To be accomplished in 4 weeks:  1. Patient will be independent and compliant with HEP to progress toward goals and restore functional mobility. Eval Status: issued at eval  Current: 6/21/22 reports daily compliance, progressing     2. Patient will improve FOTO score by 5 points to improve functional tolerance for exercise. Eval Status: FOTO 36  FOTO score = an established functional score where 100 = no disability     3. Patient will improve pain in bilateral hips to 5/10  to improve standing tolerance and restore prior level of function. Eval Status: 10/10 at worst  Current status 6/21/22: at beginning of session 8/10, post session 7/10, progressing     4. Pt will increase MMT of hip strength by 1/5 in order to return to goals of improved walking. Eval Status:   Hip Left (1-5) Right (1-5)   Hip Flexion 2- 3+   Hip Extension NT NT   Hip ABD 2 3-   Hip ADD 3- 4-   Hip ER NT NT   Hip IR NT NT         5. Pt will have 120 degrees of passive pain free hip flexion to aid in functional mechanics for ambulation/ADLs.   Eval Status:   AROM                             PROM  Hip Left Right Left Right   Flexion 115 ! (propped) 105 ! 120 ! 110 ! ER 10 ! 20 ! 22 ! 20 !   IR 0 ! 40 ! 25 ! 40 !         Long Term Goals: To be accomplished in 6 weeks:  1. Patient will improve FOTO score by 11 points to improve functional tolerance for ADLs. Eval Status: FOTO 36  FOTO score = an established functional score where 100 = no disability     2.   Pt will be able to transfer sit to supine and supine to sit with no assistance to aid in functional mechanics for bed mobility. Eval Status: Sit-Supine: Min A for left LE              Supine-Sit: min A for left LE management  Current: 6/10/22 pt min A for sit to supine, independent with supine to sit.     3.   Pt will ambulate 250 ft with SPC and no LOB in order to be a safe community ambulator. Eval Status: Pt able to ambulate 10 ft with SPC, decreased step height and length on left LE. Current status 6/21/22: demonstrates ambulation in parallel bars for total of 32 feet and with SC/CGA for 40 feet, progressing     4.   Patient will improve pain in low back to 0/10 at worst to improve ADL tolerance and restore prior level of function.   Eval Status: 10/10 at worst           PLAN  []  Upgrade activities as tolerated     [x]  Continue plan of care  []  Update interventions per flow sheet       []  Discharge due to:_  []  Other:_      Jerlene Kocher, PT 6/21/2022  7:53 AM    Future Appointments   Date Time Provider Marty Jj   6/23/2022  7:45 AM Carol Cocking, [address] [facility]   6/29/2022  8:30 AM Carol Cocking, PT [facility]   6/30/2022  7:45 AM Carol Cocking, PT [facility]   7/5/2022  7:45 AM Horace Fraction, PT [facility]   7/7/2022  7:45 AM Carol Cocking, PT [facility]   7/12/2022  7:45 AM Horace Fraction, PT [facility]   7/14/2022  7:45 AM Carol Cocking, PT [facility]   7/26/2022  7:45 AM Horace Fraction, PT [facility]   7/28/2022  5:15 PM Horace Fraction, PT [facility]

## 2022-06-23 ENCOUNTER — HOSPITAL ENCOUNTER (OUTPATIENT)
Dept: PHYSICAL THERAPY | Age: 57
Discharge: HOME | End: 2022-06-23
Payer: MEDICARE

## 2022-06-23 PROCEDURE — 97530 THERAPEUTIC ACTIVITIES: CPT

## 2022-06-23 PROCEDURE — 97116 GAIT TRAINING THERAPY: CPT

## 2022-06-23 PROCEDURE — 97110 THERAPEUTIC EXERCISES: CPT

## 2022-06-23 NOTE — PROGRESS NOTES
PT DAILY TREATMENT NOTE    Patient Name: Jennifer Arizmendi  Date:2022  : 1965  [x]  Patient  Verified  Payor: BLUE CROSS MEDICARE / Plan: Katrina Thapa 8141 HMO / Product Type: Managed Care Medicare /    In time:7:46  Out time:8:33  Total Treatment Time (min): 47  Total Timed Codes (min): 47  1:1 Treatment Time (MC/BCBS only): 52   Visit #: 5 of 12    Treatment Dx: Right hip pain [M25.551]  Left hip pain [M25.552]    SUBJECTIVE  Pain Level (0-10 scale): 8  Any medication changes, allergies to medications, adverse drug reactions, diagnosis change, or new procedure performed?: [x] No    [] Yes (see summary sheet for update)  Subjective functional status/changes:   [] No changes reported  Pt reports no new falls but is still sore from last fall. OBJECTIVE     20 min Therapeutic Exercise:  [x] See flow sheet :   Rationale: increase ROM and increase strength to improve the patients ability to return to improve functional independence    17 min Therapeutic Activity:  [x]  See flow sheet :   Rationale: increase ROM, increase strength, improve coordination and improve balance  to improve the patients ability to improve functional independence    10 min Gait Training:  // bars 15 ft x 4 with single UE, Backwards 15 ft x 2 with min A for balance   Rationale: To improve ambulation safety and efficiency in order to improve patient's ability to safely ambulate at home for self care. With   [] TE   [] TA   [] neuro   [] other: Patient Education: [x] Review HEP    [] Progressed/Changed HEP based on:   [] positioning   [] body mechanics   [] transfers   [] heat/ice application    [] other:         Pain Level (0-10 scale) post treatment: 7    ASSESSMENT/Changes in Function: Pt with fair tolerance to today's session. Pt reported fatigue during performance of retro walking in parallel bars requring min A.  Pt with good tolerance to addition of step taps in order to improve hip and knee flexion (AA on left). Pt is making good progress towards their goals. Pt will continue to benefit from skilled therapeutic intervention at this time to address th remaining deficits as discussed in goals below. Patient will continue to benefit from skilled PT services to modify and progress therapeutic interventions, address functional mobility deficits, address ROM deficits, address strength deficits, analyze and address soft tissue restrictions, analyze and cue movement patterns, analyze and modify body mechanics/ergonomics, assess and modify postural abnormalities and address imbalance/dizziness to attain remaining goals. [x]  See Plan of Care  []  See progress note/recertification  []  See Discharge Summary         Progress towards goals / Updated goals:  Short Term Goals: To be accomplished in 4 weeks:  1. Patient will be independent and compliant with HEP to progress toward goals and restore functional mobility. Eval Status: issued at eval  Current: 6/21/22 reports daily compliance, progressing     2. Patient will improve FOTO score by 5 points to improve functional tolerance for exercise. Eval Status: FOTO 36   Current 6/23/22: 53 - MET  FOTO score = an established functional score where 100 = no disability     3. Patient will improve pain in bilateral hips to 5/10  to improve standing tolerance and restore prior level of function. Eval Status: 10/10 at worst  Current status 6/21/22: at beginning of session 8/10, post session 7/10, progressing     4. Pt will increase MMT of hip strength by 1/5 in order to return to goals of improved walking. Eval Status:   Hip Left (1-5) Right (1-5)   Hip Flexion 2- 3+   Hip Extension NT NT   Hip ABD 2 3-   Hip ADD 3- 4-   Hip ER NT NT   Hip IR NT NT         5. Pt will have 120 degrees of passive pain free hip flexion to aid in functional mechanics for ambulation/ADLs.   Eval Status:   AROM                             PROM  Hip Left Right Left Right   Flexion 115 ! (propped) 105 ! 120 ! 110 ! ER 10 ! 20 ! 22 ! 20 !   IR 0 ! 40 ! 25 ! 40 !         Long Term Goals: To be accomplished in 6 weeks:  1. Patient will improve FOTO score by 11 points to improve functional tolerance for ADLs. Eval Status: FOTO 36   Current 6/23/22: 53 - MET  FOTO score = an established functional score where 100 = no disability     2.   Pt will be able to transfer sit to supine and supine to sit with no assistance to aid in functional mechanics for bed mobility. Eval Status: Sit-Supine: Min A for left LE              Supine-Sit: min A for left LE management  Current: 6/10/22 pt min A for sit to supine, independent with supine to sit.     3.   Pt will ambulate 250 ft with SPC and no LOB in order to be a safe community ambulator. Eval Status: Pt able to ambulate 10 ft with SPC, decreased step height and length on left LE. Current status 6/21/22: demonstrates ambulation in parallel bars for total of 32 feet and with SC/CGA for 40 feet, progressing     4.   Patient will improve pain in low back to 0/10 at worst to improve ADL tolerance and restore prior level of function.   Eval Status: 10/10 at worst       PLAN  []  Upgrade activities as tolerated     [x]  Continue plan of care  []  Update interventions per flow sheet       []  Discharge due to:_  []  Other:_      Martinez Ortiz, PT 6/23/2022  7:42 AM    Future Appointments   Date Time Provider Marty Jj   6/23/2022  7:45 AM Arnoldo Pickett, PT [facility]   6/29/2022  8:30 AM Arnoldo Pickett, PT [facility]   7/5/2022  7:45 AM Mikey Christ, PT [facility]   7/7/2022  7:45 AM Arnoldo Pickett, PT [facility]   7/12/2022  7:45 AM Imlay Christ, PT [facility]   7/14/2022  7:45 AM Arnoldo Pickett, PT [facility]   7/26/2022  7:45 AM Mikey Christ, PT [facility]   7/28/2022  5:15 PM ELMO Rizo [facility]

## 2022-06-29 ENCOUNTER — HOSPITAL ENCOUNTER (OUTPATIENT)
Dept: PHYSICAL THERAPY | Age: 57
Discharge: HOME | End: 2022-06-29
Payer: MEDICARE

## 2022-06-29 PROCEDURE — 97116 GAIT TRAINING THERAPY: CPT

## 2022-06-29 PROCEDURE — 97110 THERAPEUTIC EXERCISES: CPT

## 2022-06-29 PROCEDURE — 97530 THERAPEUTIC ACTIVITIES: CPT

## 2022-06-29 PROCEDURE — 97112 NEUROMUSCULAR REEDUCATION: CPT

## 2022-06-29 NOTE — PROGRESS NOTES
PT DAILY TREATMENT NOTE    Patient Name: Mariam Tariq  Date:2022  : 1965  [x]  Patient  Verified  Payor: BLUE CROSS MEDICARE / Plan: Katrina Thapa 8141 HMO / Product Type: Managed Care Medicare /    In time:8:31  Out time:9:25  Total Treatment Time (min): 54  Total Timed Codes (min): 54  1:1 Treatment Time (MC/BCBS only): 50   Visit #: 6 of 12    Treatment Dx: Right hip pain [M25.551]  Left hip pain [M25.552]    SUBJECTIVE  Pain Level (0-10 scale): 9  Any medication changes, allergies to medications, adverse drug reactions, diagnosis change, or new procedure performed?: [x] No    [] Yes (see summary sheet for update)  Subjective functional status/changes:   [] No changes reported  Pt reports she didn't sleep very well and has a lot of pain today. OBJECTIVE    17 min Therapeutic Exercise:  [x] See flow sheet :   Rationale: increase ROM, increase strength and improve coordination to improve the patients ability to improve functional mobility and independence. 16 min Therapeutic Activity:  [x]  See flow sheet :   Rationale: increase ROM, increase strength and improve coordination  to improve the patients ability to improve functional mobility and independence. 13 min Neuromuscular Re-education:  [x]  See flow sheet :   Rationale: increase ROM, increase strength, improve coordination and improve balance  to improve the patients ability to reduce fall risk. 8 min Gait Trainin ft x 2 with SPC   Rationale: To improve ambulation safety and efficiency in order to improve patient's ability to safely ambulate at home for self care.            With   [] TE   [] TA   [] neuro   [] other: Patient Education: [x] Review HEP  Reminded of proper sleeping position  [] Progressed/Changed HEP based on:   [] positioning   [] body mechanics   [] transfers   [] heat/ice application    [] other:      Other Objective/Functional Measures: see below     Pain Level (0-10 scale) post treatment: 8    ASSESSMENT/Changes in Function: Pt with decent tolerance to today's session despite high pain levels. Pt able to walk 45 ft x 2 this date with fair tolerance. Pt reported stretching during performance of sidelying oblique elongation exercise. Pt with good tolerance to addition of nustep in order to improve activity tolerance and LE strength, able to tolerate for 2 minutes. Pt is making good progress towards their goals. Pt will continue to benefit from skilled therapeutic intervention at this time to address th remaining deficits as discussed in goals below. Patient will continue to benefit from skilled PT services to modify and progress therapeutic interventions, address functional mobility deficits, address ROM deficits, address strength deficits, analyze and address soft tissue restrictions, analyze and cue movement patterns, analyze and modify body mechanics/ergonomics, assess and modify postural abnormalities and address imbalance/dizziness to attain remaining goals. [x]  See Plan of Care  []  See progress note/recertification  []  See Discharge Summary         Progress towards goals / Updated goals:  Short Term Goals: To be accomplished in 4 weeks:  1. Patient will be independent and compliant with HEP to progress toward goals and restore functional mobility. Eval Status: issued at eval  Current: 6/29/22 reports daily compliance, progressing     2. Patient will improve FOTO score by 5 points to improve functional tolerance for exercise. Eval Status: FOTO 36   Current 6/23/22: 53 - MET  FOTO score = an established functional score where 100 = no disability     3. Patient will improve pain in bilateral hips to 5/10  to improve standing tolerance and restore prior level of function. Eval Status: 10/10 at worst  Current status 6/29/22: Pt has major fluctuations in pain but consistently ranges between 6-10 dependent on the day. - no significant change     4.  Pt will increase MMT of hip strength by 1/5 in order to return to goals of improved walking. Eval Status:   Hip Left (1-5) Right (1-5)   Hip Flexion 2- 3+   Hip ABD 2 3-   Hip ADD 3- 4-    Current 6/29/22: - prgrogressing  Hip Left (1-5) Right (1-5)   Hip Flexion 2 4-   Hip ABD 2 3+   Hip ADD 3- 4      5. Pt will have 120 degrees of passive pain free hip flexion to aid in functional mechanics for ambulation/ADLs. Eval Status:   AROM                             PROM  Hip Left Right Left Right   Flexion 115 ! (propped) 105 ! 120 ! 110 ! ER 10 ! 20 ! 22 ! 20 !   IR 0 ! 40 ! 25 ! 40 !    Current 6/29/22: Flexion: Right: 108 Left PROM: 105 Left ER: 12  PROM IR: 30 Right: ER: 28 degrees IR: 35 degrees (pain in all directions) - no significant change     Long Term Goals: To be accomplished in 6 weeks:  1. Patient will improve FOTO score by 11 points to improve functional tolerance for ADLs. Eval Status: FOTO 36              Current 6/23/22: 53 - MET  FOTO score = an established functional score where 100 = no disability     2.   Pt will be able to transfer sit to supine and supine to sit with no assistance to aid in functional mechanics for bed mobility. Eval Status: Sit-Supine: Min A for left LE              Supine-Sit: min A for left LE management  Current: 6/29/22 pt independent for sit to supine, independent with supine to sit. - MET     3.   Pt will ambulate 250 ft with SPC and no LOB in order to be a safe community ambulator. Eval Status: Pt able to ambulate 10 ft with SPC, decreased step height and length on left LE. Current status 6/29/22: Pt able to ambulate 45 ft x 2 with SPC and wheelchair follow, seated rest break in btw. - progressing     4.   Patient will improve pain in low back to 0/10 at worst to improve ADL tolerance and restore prior level of function.   Eval Status: 10/10 at worst  Current 6/29/22: Same as Evaluation (see above goal)    PLAN  []  Upgrade activities as tolerated     [x]  Continue plan of care  []  Update interventions per flow sheet       []  Discharge due to:_  []  Other:_      Bethanie Benson, PT 6/29/2022  8:23 AM    Future Appointments   Date Time Provider Marty Jj   6/29/2022  8:30 AM Paula Budds, PT [facility]   7/5/2022  7:45 AM Fredie Mantle, PT [facility]   7/7/2022  7:45 AM Paula Budds, PT [facility]   7/12/2022  7:45 AM Fredie Mantle, PT [facility]   7/14/2022  7:45 AM Paula Budds, PT [facility]   7/26/2022  7:45 AM Fredie Mantle, PT [facility]   7/28/2022  5:15 PM Fredie Mantle, PT [facility]

## 2022-06-29 NOTE — PROGRESS NOTES
[facility] at the [address], [place], [address] [facility]  Phone: [phone]      Fax:  [phone]    Progress Note    Patient name: Alfreda Cancino Start of Care: 2022   Referral source: Jessica West MD : 1965               Medical Diagnosis: Right hip pain [M25.551]  Left hip pain [M25.552]    Onset Date: 22               Treatment Diagnosis: Bilateral hip pain, low back pain   Prior Hospitalization: see medical history Provider#: [id number]   Medications: Verified on Patient summary List    Comorbidities: Spina bifida, Stroke when she was 7, Depression, arthritis, Left RENATO in , revision    Prior Level of Function: Pt mainly wc bound but was able to walk  ft with SPC. Independent with WC. Visits from Start of Care: 6    Missed Visits: 0    Progress Towards Goals: Short Term Goals: To be accomplished in 4 weeks:  1. Patient will be independent and compliant with HEP to progress toward goals and restore functional mobility. Eval Status: issued at eval  Current: 22 reports daily compliance, progressing     2. Patient will improve FOTO score by 5 points to improve functional tolerance for exercise. Eval Status: FOTO 36   Current 22: 53 - MET  FOTO score = an established functional score where 100 = no disability     3. Patient will improve pain in bilateral hips to 5/10  to improve standing tolerance and restore prior level of function. Eval Status: 10/10 at worst  Current status 22: Pt has major fluctuations in pain but consistently ranges between 6-10 dependent on the day. - no significant change     4. Pt will increase MMT of hip strength by 1/5 in order to return to goals of improved walking.   Eval Status:   Hip Left (1-5) Right (1-5)   Hip Flexion 2- 3+   Hip ABD 2 3-   Hip ADD 3- 4-    Current 22: - prgrogressing  Hip Left (1-5) Right (1-5)   Hip Flexion 2 4-   Hip ABD 2 3+   Hip ADD 3- 4    5. Pt will have 120 degrees of passive pain free hip flexion to aid in functional mechanics for ambulation/ADLs. Eval Status:   AROM                             PROM  Hip Left Right Left Right   Flexion 115 ! (propped) 105 ! 120 ! 110 ! ER 10 ! 20 ! 22 ! 20 !   IR 0 ! 40 ! 25 ! 40 !    Current 6/29/22: Flexion: Right: 108 Left PROM: 105 Left ER: 12  PROM IR: 30 Right: ER: 28 degrees IR: 35 degrees (pain in all directions) - no significant change     Long Term Goals: To be accomplished in 6 weeks:  1. Patient will improve FOTO score by 11 points to improve functional tolerance for ADLs. Eval Status: FOTO 36              Current 6/23/22: 53 - MET  FOTO score = an established functional score where 100 = no disability     2.   Pt will be able to transfer sit to supine and supine to sit with no assistance to aid in functional mechanics for bed mobility. Eval Status: Sit-Supine: Min A for left LE              Supine-Sit: min A for left LE management  Current: 6/29/22 pt independent for sit to supine, independent with supine to sit. - MET     3.   Pt will ambulate 100 ft with SPC and no LOB in order to be a safe household ambulator. (Modified)  Eval Status: Pt able to ambulate 10 ft with SPC, decreased step height and length on left LE. Current status 6/29/22: Pt able to ambulate 45 ft x 2 with SPC and wheelchair follow, seated rest break in btw. - progressing     4.   Patient will improve pain in low back to 0/10 at worst to improve ADL tolerance and restore prior level of function. Eval Status: 10/10 at worst  Current 6/29/22: Same as Evaluation (see above goal)    Key Functional Changes: 62 yo female who [facility] to [facility] with c/o bilateral hip pain and low back pain. Past medical history is significant for spina bifida and left sided stroke. Pt reports she had this pain before that went away after physical therapy.  Francesco Pillion began again in February 2022. Reports pain in bilateral hips (Right worse than left) and low back. Report she started falling a lot more than usual. Pt has been seen for 6 visits and is making slow progress. Pt has only had 1 major fall since start of therapy. Pt able to walk 45 ft x 2 with SPC this date with fair tolerance. Pt continues to have reduced activity tolerance and difficulty performing transfers. Pt has improve mobility and LE strength as seen in the goals above. Pt is making good progress towards their goals.   Patient will continue to benefit from skilled PT services to modify and progress therapeutic interventions, address functional mobility deficits, address ROM deficits, address strength deficits, analyze and address soft tissue restrictions, analyze and cue movement patterns, analyze and modify body mechanics/ergonomics, assess and modify postural abnormalities and address imbalance/dizziness to attain remaining goals. Updated Goals: to be achieved in 6 weeks:     ASSESSMENT/RECOMMENDATIONS:  []Continue therapy per initial plan/protocol at a frequency of  2 x per week for 6 weeks  []Continue therapy with the following recommended changes:_____________________      _____________________________________________________________________  []Discontinue therapy progressing towards or have reached established goals  []Discontinue therapy due to lack of appreciable progress towards goals  []Discontinue therapy due to lack of attendance or compliance  []Await Physician's recommendations/decisions regarding therapy  []Other:________________________________________________________________    Thank you for this referral.   Vishal Odom, PT 6/29/2022 12:49 PM  NOTE TO PHYSICIAN:  PLEASE COMPLETE THE ORDERS BELOW AND   FAX TO [facility]: [phone]  If you are unable to process this request in 24 hours please contact our office: [phone]        []  I have read the above report and request that my patient continue as recommended.   []  I have read the above report and request that my patient continue therapy with the following changes/special instructions:________________________________________  []I have read the above report and request that my patient be discharged from therapy.     Physician's Signature:____________Date:_________TIME:________                                      Joe Cole MD      ** Signature, Date and Time must be completed for valid certification **

## 2022-06-30 ENCOUNTER — APPOINTMENT (OUTPATIENT)
Dept: PHYSICAL THERAPY | Age: 57
End: 2022-06-30
Payer: MEDICARE

## 2022-07-05 ENCOUNTER — HOSPITAL ENCOUNTER (OUTPATIENT)
Dept: PHYSICAL THERAPY | Age: 57
Discharge: HOME | End: 2022-07-05
Payer: MEDICARE

## 2022-07-05 PROCEDURE — 97116 GAIT TRAINING THERAPY: CPT

## 2022-07-05 PROCEDURE — 97112 NEUROMUSCULAR REEDUCATION: CPT

## 2022-07-05 PROCEDURE — 97110 THERAPEUTIC EXERCISES: CPT

## 2022-07-05 NOTE — PROGRESS NOTES
PT DAILY TREATMENT NOTE    Patient Name: Kristen Fill  Date:2022  : 1965  [x]  Patient  Verified  Payor: BLUE CROSS MEDICARE / Plan: Katrina Thapa 8141 HMO / Product Type: Managed Care Medicare /    In time:748  Out time:835  Total Treatment Time (min): 47  Total Timed Codes (min): 47  1:1 Treatment Time (MC/BCBS only): 52   Visit #: 7 of 12    Treatment Dx: Right hip pain [M25.551]  Left hip pain [M25.552]    SUBJECTIVE  Pain Level (0-10 scale): 8  Any medication changes, allergies to medications, adverse drug reactions, diagnosis change, or new procedure performed?: [x] No    [] Yes (see summary sheet for update)  Subjective functional status/changes:   [] No changes reported  \"Sometimes I forget about my left side\"  Reports pain in right hip and LB    OBJECTIVE          17 min Therapeutic Exercise:  [x] See flow sheet :   Rationale: increase ROM, increase strength and improve coordination to improve the patients ability to perform ADL with more ease       20 min Neuromuscular Re-education:  [x]  See flow sheet :trunk symmetry, use of left side   Rationale: improve coordination, improve balance and increase proprioception  to improve the patients ability to perform ADL with improved alignment and use of left side      10 min Gait Trainin feet with quad cane  on level surfaces with CGA level of assistance with one rest period, focus on lateralization to left and increased WB through left leg   Rationale: To improve ambulation safety and efficiency in order to improve patient's ability to safely ambulate at home for self care.            With   [x] TE   [] TA   [] neuro   [] other: Patient Education: [x] Review HEP    [] Progressed/Changed HEP based on:   [] positioning   [] body mechanics   [] transfers   [] heat/ice application    [] other:      Other Objective/Functional Measures:   Patient challenged with bridging and SB curls, needs min assist with left leg  -performed supine to right S/L rolling with manual assist/resist for facilitation of left arm reach and trunk rotation to improve transfers  -significant difficulty to lift legs onto treatment table during sit to supine transfer       Pain Level (0-10 scale) post treatment: 5    ASSESSMENT/Changes in Function: patient challenged with lateralization to left to optimize left WB however able to  parallel bars without assist for > 30 sec, improved awareness of PPT performed jack in seated position, needs frequent VC to reduce LS overcompensation    Patient will continue to benefit from skilled PT services to address ROM deficits, address strength deficits, analyze and address soft tissue restrictions and analyze and cue movement patterns to attain remaining goals. [x]  See Plan of Care  []  See progress note/recertification  []  See Discharge Summary         Progress Towards Goals: Short Term Goals: To be accomplished in 4 weeks:  1. Patient will be independent and compliant with HEP to progress toward goals and restore functional mobility. Eval Status: issued at eval  Current: 7/5/22 reports daily compliance, updated HEP     2. Patient will improve FOTO score by 5 points to improve functional tolerance for exercise. Eval Status: FOTO 36   Current 6/23/22: 53 - MET  FOTO score = an established functional score where 100 = no disability     3. Patient will improve pain in bilateral hips to 5/10  to improve standing tolerance and restore prior level of function. Eval Status: 10/10 at worst  Current status 6/29/22: Pt has major fluctuations in pain but consistently ranges between 6-10 dependent on the day. - no significant change  Status on 7/5/22: reduction in LB/hip pain to 5/10 post session, progressing     4. Pt will increase MMT of hip strength by 1/5 in order to return to goals of improved walking.   Eval Status:   Hip Left (1-5) Right (1-5)   Hip Flexion 2- 3+   Hip ABD 2 3-   Hip ADD 3- 4-    Current 6/29/22: - prgrogressing  Hip Left (1-5) Right (1-5)   Hip Flexion 2 4-   Hip ABD 2 3+   Hip ADD 3- 4      5. Pt will have 120 degrees of passive pain free hip flexion to aid in functional mechanics for ambulation/ADLs. Eval Status:   AROM                             PROM  Hip Left Right Left Right   Flexion 115 ! (propped) 105 ! 120 ! 110 ! ER 10 ! 20 ! 22 ! 20 !   IR 0 ! 40 ! 25 ! 40 !    Current 6/29/22: Flexion: Right: 108 Left PROM: 105 Left ER: 12  PROM IR: 30 Right: ER: 28 degrees IR: 35 degrees (pain in all directions) - no significant change     Long Term Goals: To be accomplished in 6 weeks:  1. Patient will improve FOTO score by 11 points to improve functional tolerance for ADLs. Eval Status: FOTO 36              Current 6/23/22: 53 - MET  FOTO score = an established functional score where 100 = no disability     2.   Pt will be able to transfer sit to supine and supine to sit with no assistance to aid in functional mechanics for bed mobility. Eval Status: Sit-Supine: Min A for left LE              Supine-Sit: min A for left LE management  Current: 6/29/22 pt independent for sit to supine, independent with supine to sit. - MET     3.   Pt will ambulate 100 ft with SPC and no LOB in order to be a safe household ambulator. (Modified)  Eval Status: Pt able to ambulate 10 ft with SPC, decreased step height and length on left LE. Current status 7/5/22: Pt able to ambulate total of 174 ft with SPC with one seated rest break  - progressing     4.   Patient will improve pain in low back to 0/10 at worst to improve ADL tolerance and restore prior level of function.   Eval Status: 10/10 at worst  Current 7/5/22: pain at beginning of session 8/10 right LB/hip, post session 5/10, progressing        PLAN  []  Upgrade activities as tolerated     [x]  Continue plan of care  []  Update interventions per flow sheet       []  Discharge due to:_  []  Other:_      Roberto Callahan, PT 7/5/2022  7:59 AM    Future Appointments   Date Time Provider [place] Jj   7/7/2022  7:45 AM Juju Pain, PT [facility]   7/12/2022  7:45 AM Brannon Forrest, PT [facility]   7/14/2022  7:45 AM Juju Pain, PT [facility]   7/26/2022  7:45 AM Brannon Forrest, PT [facility]   7/28/2022  5:15 PM Brannon Forrest, PT [facility]

## 2022-07-07 ENCOUNTER — HOSPITAL ENCOUNTER (OUTPATIENT)
Dept: PHYSICAL THERAPY | Age: 57
Discharge: HOME | End: 2022-07-07
Payer: MEDICARE

## 2022-07-07 PROCEDURE — 97110 THERAPEUTIC EXERCISES: CPT

## 2022-07-07 PROCEDURE — 97140 MANUAL THERAPY 1/> REGIONS: CPT

## 2022-07-07 PROCEDURE — 97116 GAIT TRAINING THERAPY: CPT

## 2022-07-07 PROCEDURE — 97530 THERAPEUTIC ACTIVITIES: CPT

## 2022-07-07 NOTE — PROGRESS NOTES
PT DAILY TREATMENT NOTE    Patient Name: Tariq Salinas  Date:2022  : 1965  [x]  Patient  Verified  Payor: Mehreen Reagan / Plan: Katrina Thapa 8141 HMO / Product Type: Managed Care Medicare /    In time:7:48  Out time:8:41  Total Treatment Time (min): 53  Total Timed Codes (min): 53  1:1 Treatment Time (MC/BCBS only): 52   Visit #: 8 of 12    Treatment Dx: Right hip pain [M25.551]  Left hip pain [M25.552]    SUBJECTIVE  Pain Level (0-10 scale): 7  Any medication changes, allergies to medications, adverse drug reactions, diagnosis change, or new procedure performed?: [x] No    [] Yes (see summary sheet for update)  Subjective functional status/changes:   [] No changes reported  Reports she was a 4/10 when she woke up but then after she did her transfers it went up to 7/10    OBJECTIVE    14 min Therapeutic Exercise:  [x] See flow sheet :   Rationale: increase ROM and increase strength to improve the patients ability to improve functional independence. 21 min Therapeutic Activity:  [x]  See flow sheet :   Rationale: increase ROM, increase strength and improve coordination  to improve the patients ability to improve independence with ADLs. 10 min Manual Therapy:  PROM stretching of VEGA hips, hamstring, glutes, TFL, light STM to hamstrings   Rationale: decrease pain, increase ROM, increase tissue extensibility and decrease trigger points to improve the patients ability to tolerate functional mobility  The manual therapy interventions were performed at a separate and distinct time from the therapeutic activities interventions. 8 min Gait Training:  10 ft x 2 in // bars, 10 ft lateral walking in // bars, CGA   Rationale: To improve ambulation safety and efficiency in order to improve patient's ability to safely ambulate at home for self care.            With   [] TE   [] TA   [] neuro   [] other: Patient Education: [x] Review HEP    [] Progressed/Changed HEP based on:   [] positioning   [] body mechanics   [] transfers   [] heat/ice application    [] other:      Pain Level (0-10 scale) post treatment: 7    ASSESSMENT/Changes in Function: Pt with good tolerance to today's session. Pt reported improved stability and core strength during performance of seated reaching exercises. Pt with fair tolerance to addition of lateral walking in parallel bars in order to improve dynamic gait. Pt reported fear during performance of this exercise. Pt is making slow but good progress towards their goals. Pt showing improved core strength and standing stability. Pt will continue to benefit from skilled therapeutic intervention at this time to address the remaining deficits as discussed in goals below. Patient will continue to benefit from skilled PT services to modify and progress therapeutic interventions, address functional mobility deficits, address ROM deficits, address strength deficits, analyze and address soft tissue restrictions, analyze and cue movement patterns, analyze and modify body mechanics/ergonomics, assess and modify postural abnormalities and address imbalance/dizziness to attain remaining goals. [x]  See Plan of Care  []  See progress note/recertification  []  See Discharge Summary         Progress towards goals / Updated goals:  Short Term Goals: To be accomplished in 4 weeks:  1. Patient will be independent and compliant with HEP to progress toward goals and restore functional mobility. Eval Status: issued at eval  Current: 7/5/22 reports daily compliance, updated HEP     2. Patient will improve FOTO score by 5 points to improve functional tolerance for exercise. Eval Status: FOTO 36   Current 6/23/22: 53 - MET  FOTO score = an established functional score where 100 = no disability     3. Patient will improve pain in bilateral hips to 5/10  to improve standing tolerance and restore prior level of function.   Eval Status: 10/10 at worst  Current status 6/29/22: Pt has major fluctuations in pain but consistently ranges between 6-10 dependent on the day. - no significant change  Status on 7/5/22: reduction in LB/hip pain to 5/10 post session, progressing     4. Pt will increase MMT of hip strength by 1/5 in order to return to goals of improved walking. Eval Status:   Hip Left (1-5) Right (1-5)   Hip Flexion 2- 3+   Hip ABD 2 3-   Hip ADD 3- 4-    Current 6/29/22: - prgrogressing  Hip Left (1-5) Right (1-5)   Hip Flexion 2 4-   Hip ABD 2 3+   Hip ADD 3- 4      5. Pt will have 120 degrees of passive pain free hip flexion to aid in functional mechanics for ambulation/ADLs. Eval Status:   AROM                             PROM  Hip Left Right Left Right   Flexion 115 ! (propped) 105 ! 120 ! 110 ! ER 10 ! 20 ! 22 ! 20 !   IR 0 ! 40 ! 25 ! 40 !    Current 6/29/22: Flexion: Right: 108 Left PROM: 105 Left ER: 12  PROM IR: 30 Right: ER: 28 degrees IR: 35 degrees (pain in all directions) - no significant change     Long Term Goals: To be accomplished in 6 weeks:  1. Patient will improve FOTO score by 11 points to improve functional tolerance for ADLs. Eval Status: FOTO 36              Current 6/23/22: 53 - MET  FOTO score = an established functional score where 100 = no disability     2.   Pt will be able to transfer sit to supine and supine to sit with no assistance to aid in functional mechanics for bed mobility. Eval Status: Sit-Supine: Min A for left LE              Supine-Sit: min A for left LE management  Current: 6/29/22 pt independent for sit to supine, independent with supine to sit. - MET     3.   Pt will ambulate 100 ft with SPC and no LOB in order to be a safe household ambulator. (Modified)  Eval Status: Pt able to ambulate 10 ft with SPC, decreased step height and length on left LE.   Current status 7/5/22: Pt able to ambulate total of 174 ft with SPC with one seated rest break  - progressing     4.   Patient will improve pain in low back to 0/10 at worst to improve ADL tolerance and restore prior level of function.   Eval Status: 10/10 at worst  Current 7/5/22: pain at beginning of session 8/10 right LB/hip, post session 5/10, progressing    PLAN  []  Upgrade activities as tolerated     [x]  Continue plan of care  []  Update interventions per flow sheet       []  Discharge due to:_  []  Other:_      Jimmie Ruano, PT 7/7/2022  7:41 AM    Future Appointments   Date Time Provider [place] Jj   7/7/2022  7:45 AM Phan Number, PT [facility]   7/12/2022  7:45 AM Judye Silence, PT [facility]   7/14/2022  7:45 AM Phan Number, PT [facility]   7/26/2022  7:45 AM Judye Silence, PT [facility]   7/28/2022  5:15 PM Judye Silence, PT [facility]

## 2022-07-12 ENCOUNTER — HOSPITAL ENCOUNTER (OUTPATIENT)
Dept: PHYSICAL THERAPY | Age: 57
End: 2022-07-12
Payer: MEDICARE

## 2022-07-14 ENCOUNTER — HOSPITAL ENCOUNTER (OUTPATIENT)
Dept: PHYSICAL THERAPY | Age: 57
Discharge: HOME | End: 2022-07-14
Payer: MEDICARE

## 2022-07-14 PROCEDURE — 97530 THERAPEUTIC ACTIVITIES: CPT

## 2022-07-14 PROCEDURE — 97116 GAIT TRAINING THERAPY: CPT

## 2022-07-14 PROCEDURE — 97110 THERAPEUTIC EXERCISES: CPT

## 2022-07-14 NOTE — PROGRESS NOTES
[facility] at the [address], [place], [address] [facility]  Phone: [phone]      Fax:  [phone]    Progress Note  Patient name: Emilia Chavez of Care: 6/1/2022   Referral Rosales Dillon MD HRU: 72/9/1011               Medical Diagnosis: Right hip pain [M25.551]  Left hip pain [M25.552]    Onset Date: 2/28/22               Treatment Diagnosis: Bilateral hip pain, low back pain   Prior Hospitalization: see medical history Provider#: [id number]   Medications: Verified on Patient summary List    Comorbidities: Spina bifida, Stroke when she was 7, Depression, arthritis, Left RENATO in 2007, revision 2009   Prior Level of Function: Pt mainly wc bound but was able to walk  ft with SPC. Independent with WC. Visits from Start of Care: 9    Missed Visits: 1    Progress Towards Goals: Short Term Goals: To be accomplished in 4 weeks:  1. Patient will be independent and compliant with HEP to progress toward goals and restore functional mobility. Eval Status: issued at eval  Current: 7/14/22 reports daily compliance, updated HEP     2. Patient will improve FOTO score by 5 points to improve functional tolerance for exercise. Eval Status: FOTO 36   Current 6/23/22: 53 - MET  FOTO score = an established functional score where 100 = no disability     3. Patient will improve pain in bilateral hips to 5/10  to improve standing tolerance and restore prior level of function. Eval Status: 10/10 at worst  Current Status on 7/14/22: 7/10 at start of visit. Reports Increased pain with walking too much, 5/10 at a minimum (occasional)     4. Pt will increase MMT of hip strength by 1/5 in order to return to goals of improved walking. Eval Status:   Hip Left (1-5) Right (1-5)   Hip Flexion 2- 3+   Hip ABD 2 3-   Hip ADD 3- 4-    Current 714/22: - prgrogressing  Hip Left (1-5) Right (1-5)   Hip Flexion 2+ 4   Hip ABD 2 3+   Hip ADD 3- 4      5.  Pt will [facility] 642 degrees of passive pain free hip flexion to aid in functional mechanics for ambulation/ADLs. Eval Status:   AROM                             PROM  Hip Left Right Left Right   Flexion 115 ! (propped) 105 ! 120 ! 110 ! ER 10 ! 20 ! 22 ! 20 !   IR 0 ! 40 ! 25 ! 40 !    Current 7/14/22: Flexion: Right: 122 Left PROM: 115 (AROM 65) Left ER: 12  PROM IR: 30 Right: ER: 28 degrees IR: 35 degrees     Long Term Goals: To be accomplished in 6 weeks:  1. Patient will improve FOTO score by 11 points to improve functional tolerance for ADLs. Eval Status: FOTO 36              Current 6/23/22: 53 - MET  FOTO score = an established functional score where 100 = no disability     2.   Pt will be able to transfer sit to supine and supine to sit with no assistance to aid in functional mechanics for bed mobility. Eval Status: Sit-Supine: Min A for left LE              Supine-Sit: min A for left LE management  Current: 6/29/22 pt independent for sit to supine, independent with supine to sit. - MET     3.   Pt will ambulate 100 ft with SPC and no LOB in order to be a safe household ambulator. (Modified)  Eval Status: Pt able to ambulate 10 ft with SPC, decreased step height and length on left LE. Current status 7/14/22: Pt able to ambulate total of 105 ft with SPC and SBA-CGA, pt with progressively faster gait speed and reduced foot clearance (pt with major LOB when stopping to sit in chair resulting in a decent to the floor)     4.   Patient will improve pain in low back to 0/10 at worst to improve ADL tolerance and restore prior level of function. Eval Status: 10/10 at worst              Current Status on 7/14/22: 7/10 at start of visit. Reports Increased pain with walking too much, 5/10 at a minimum (occasional)     New Goal:              Pt will decrease 5x STS time by 5 seconds in order to improve functional strength needed for transfers.               7/14/22: 5x STS: 33 seconds      Key Functional Changes:  64 yo female who presents to [facility] with c/o bilateral hip pain and low back pain. Past medical history is significant for spina bifida and left sided stroke. Pt reports she had this pain before that went away after physical therapy. The pain began again in February 2022. Pt has attended 9 visits and is progressing slowly towards goals. Pt has made strength gains but continues to have reduced mobility and poor balance. Pt continues to ambulate short distance with SPC. Pt has had frequent falls and is an unsafe Ambulator at this time. Patient will continue to benefit from skilled PT services to modify and progress therapeutic interventions, address functional mobility deficits, address ROM deficits, address strength deficits, analyze and address soft tissue restrictions, analyze and cue movement patterns, analyze and modify body mechanics/ergonomics, assess and modify postural abnormalities and address imbalance and reduce fall risk to attain remaining goals.     Updated Goals: to be achieved in 5 weeks:   See above    ASSESSMENT/RECOMMENDATIONS:  [x]Continue therapy recommending at a frequency of  2 x per week for 5 weeks  []Continue therapy with the following recommended changes:_____________________      _____________________________________________________________________  []Discontinue therapy progressing towards or have reached established goals  []Discontinue therapy due to lack of appreciable progress towards goals  []Discontinue therapy due to lack of attendance or compliance  []Await Physician's recommendations/decisions regarding therapy  []Other:________________________________________________________________    Thank you for this referral.   Torrie Mcdaniel, PT 7/14/2022 9:05 AM  NOTE TO PHYSICIAN:  PLEASE COMPLETE THE ORDERS BELOW AND   [facility]: [phone]  If you are unable to process this request in 24 hours please contact our office: [phone]        []  I have read the above report and request that my patient continue as recommended. []  I have read the above report and request that my patient continue therapy with the following changes/special instructions:________________________________________  []I have read the above report and request that my patient be discharged from therapy.     Physician's Signature:____________Date:_________TIME:________                                      Reese Marie MD      ** Signature, Date and Time must be completed for valid certification **

## 2022-07-14 NOTE — PROGRESS NOTES
PT DAILY TREATMENT NOTE    Patient Name: Jamie Mcfarlane  Date:2022  : 1965  [x]  Patient  Verified  Payor: BLUE CROSS MEDICARE / Plan: Katrina Thapa 8141 HMO / Product Type: Managed Care Medicare /    In time:7:46  Out time:8:32  Total Treatment Time (min): 46  Total Timed Codes (min): 46  1:1 Treatment Time (MC/BCBS only): 55   Visit #: 9 of 12    Treatment Dx: Right hip pain [M25.551]  Left hip pain [M25.552]    SUBJECTIVE  Pain Level (0-10 scale): 7  Any medication changes, allergies to medications, adverse drug reactions, diagnosis change, or new procedure performed?: [x] No    [] Yes (see summary sheet for update)  Subjective functional status/changes:   [] No changes reported  I feel ok today. Same amount of pain as always    OBJECTIVE      10 min Therapeutic Exercise:  [x] See flow sheet :   Rationale: increase ROM and increase strength to improve the patients ability to improve functional independence    21 min Therapeutic Activity:  [x]  See flow sheet : transfers   Rationale: increase ROM, increase strength, improve coordination and improve balance  to improve the patients ability to improve functional independence    15 min Gait Trainin ft with SPC and SBA progressing to mod A   Rationale: To improve ambulation safety and efficiency in order to improve patient's ability to safely ambulate at home for self care. With   [x] TE   [x] TA   [] neuro   [] other: Patient Education: [x] Review HEP    [] Progressed/Changed HEP based on:   [] positioning   [] body mechanics   [x] transfers   [] heat/ice application    [] other:      Other Objective/Functional Measures:  5xSTS: 33 seconds    Pain Level (0-10 scale) post treatment: 7    ASSESSMENT/Changes in Function: Pt with fair tolerance to today's session. Session began with pt ambulating with wc follow and SPC.  Pt gait became progressively faster, cued to slow down and  feet, pt became very fatigued and was told to stop and wait while wc was locked before sitting. Pt then had an anterior loss of balance resulting in fall. Pt's decent was slowed but resulted in a minor elbow abrasion (band aid applied). A second therapist was called to perform a 2 person dependent transfer back into her wheelchair. Pt denied any injury other than elbow. Pt reported she felt extra weak and tired today possibly due to reduce sleep and increased activity over the past few days. Pt reported challenge during performance of rolling and supine to sit transfers. Pt has made strength gains but continues to have reduced mobility and poor balance. Pt is making slow progress towards their goals. Pt will continue to benefit from skilled therapeutic intervention at this time to address the remaining deficits as discussed in goals below. Patient will continue to benefit from skilled PT services to modify and progress therapeutic interventions, address functional mobility deficits, address ROM deficits, address strength deficits, analyze and address soft tissue restrictions, analyze and cue movement patterns, analyze and modify body mechanics/ergonomics, assess and modify postural abnormalities and address imbalance/dizziness to attain remaining goals. [x]  See Plan of Care  []  See progress note/recertification  []  See Discharge Summary         Progress towards goals / Updated goals:  Short Term Goals: To be accomplished in 4 weeks:  1. Patient will be independent and compliant with HEP to progress toward goals and restore functional mobility. Eval Status: issued at eval  Current: 7/14/22 reports daily compliance, updated HEP     2. Patient will improve FOTO score by 5 points to improve functional tolerance for exercise. Eval Status: FOTO 36   Current 6/23/22: 53 - MET  FOTO score = an established functional score where 100 = no disability     3.  Patient will improve pain in bilateral hips to 5/10  to improve standing tolerance and restore prior level of function. Eval Status: 10/10 at worst  Current Status on 7/14/22: 7/10 at start of visit. Reports Increased pain with walking too much, 5/10 at a minimum (occasional)     4. Pt will increase MMT of hip strength by 1/5 in order to return to goals of improved walking. Eval Status:   Hip Left (1-5) Right (1-5)   Hip Flexion 2- 3+   Hip ABD 2 3-   Hip ADD 3- 4-    Current 714/22: - prgrogressing  Hip Left (1-5) Right (1-5)   Hip Flexion 2+ 4   Hip ABD 2 3+   Hip ADD 3- 4      5. Pt will have 120 degrees of passive pain free hip flexion to aid in functional mechanics for ambulation/ADLs. Eval Status:   AROM                             PROM  Hip Left Right Left Right   Flexion 115 ! (propped) 105 ! 120 ! 110 ! ER 10 ! 20 ! 22 ! 20 !   IR 0 ! 40 ! 25 ! 40 !    Current 7/14/22: Flexion: Right: 122 Left PROM: 115 (AROM 65) Left ER: 12  PROM IR: 30 Right: ER: 28 degrees IR: 35 degrees     Long Term Goals: To be accomplished in 6 weeks:  1. Patient will improve FOTO score by 11 points to improve functional tolerance for ADLs. Eval Status: FOTO 36              Current 6/23/22: 53 - MET  FOTO score = an established functional score where 100 = no disability     2.   Pt will be able to transfer sit to supine and supine to sit with no assistance to aid in functional mechanics for bed mobility. Eval Status: Sit-Supine: Min A for left LE              Supine-Sit: min A for left LE management  Current: 6/29/22 pt independent for sit to supine, independent with supine to sit. - MET     3.   Pt will ambulate 100 ft with SPC and no LOB in order to be a safe household ambulator. (Modified)  Eval Status: Pt able to ambulate 10 ft with SPC, decreased step height and length on left LE.    Current status 7/14/22: Pt able to ambulate total of 105 ft with SPC and SBA-CGA, pt with progressively faster gait speed and reduced foot clearance (pt with major LOB when stopping to sit in chair resulting in a decent to the floor)     4.   Patient will improve pain in low back to 0/10 at worst to improve ADL tolerance and restore prior level of function. Eval Status: 10/10 at worst   Current Status on 7/14/22: 7/10 at start of visit. Reports Increased pain with walking too much, 5/10 at a minimum (occasional)    New Goal:   Pt will decrease 5x STS time by 5 seconds in order to improve functional strength needed for transfers.    7/14/22: 5x STS: 33 seconds    PLAN  []  Upgrade activities as tolerated     [x]  Continue plan of care  []  Update interventions per flow sheet       []  Discharge due to:_  []  Other:_      Carolyn Rather, PT 7/14/2022  7:39 AM    Future Appointments   Date Time Provider [place] Jj   7/14/2022  7:45 AM Teo Rivas PT [facility]   7/26/2022  7:45 AM ELMO Noble [facility]   7/28/2022  5:15 PM ELMO Noble [facility]

## 2022-07-19 ENCOUNTER — APPOINTMENT (OUTPATIENT)
Dept: PHYSICAL THERAPY | Age: 57
End: 2022-07-19
Payer: MEDICARE

## 2022-07-21 ENCOUNTER — APPOINTMENT (OUTPATIENT)
Dept: PHYSICAL THERAPY | Age: 57
End: 2022-07-21
Payer: MEDICARE

## 2022-07-26 ENCOUNTER — HOSPITAL ENCOUNTER (OUTPATIENT)
Dept: PHYSICAL THERAPY | Age: 57
Discharge: HOME | End: 2022-07-26
Payer: MEDICARE

## 2022-07-26 PROCEDURE — 97112 NEUROMUSCULAR REEDUCATION: CPT

## 2022-07-26 PROCEDURE — 97530 THERAPEUTIC ACTIVITIES: CPT

## 2022-07-26 NOTE — PROGRESS NOTES
PT DAILY TREATMENT NOTE    Patient Name: Merary Lawson  Date:2022  : 1965  [x]  Patient  Verified  Payor: BLUE CROSS MEDICARE / Plan: Ktarina Thapa 8141 HMO / Product Type: Managed Care Medicare /    In time:748  Out time:835  Total Treatment Time (min): 47  Total Timed Codes (min): 47  1:1 Treatment Time (MC/BCBS only): 52   Visit #: 10 of 10    Treatment Dx: Right hip pain [M25.551]  Left hip pain [M25.552]    SUBJECTIVE  Pain Level (0-10 scale): 7  Any medication changes, allergies to medications, adverse drug reactions, diagnosis change, or new procedure performed?: [x] No    [] Yes (see summary sheet for update)  Subjective functional status/changes:   [] No changes reported  \" I feel really unbalanced today. Almost fell this morning when I came from the bathroom but I fell onto my bed. \"  Only bruising of right elbow after recent fall and no reports of additional pain. Patient reports interest in continuation of therapy and reports improvement in overall balance, less muscle tightness and increased ease with transfers. Also increased in house walking with quad cane.        OBJECTIVE        20 min Therapeutic Activity:  [x]  See flow sheet :   Rationale: transfers, testing to establish current status and deficits     27 min Neuromuscular Re-education:  [x]  See flow sheet :balance/gait/lateralization to left   Rationale: increase ROM, increase strength, improve coordination, improve balance, and increase proprioception  to improve the patients ability to perform ADL                With   [] TE   [x] TA   [] neuro   [] other: Patient Education: [x] Review HEP    [] Progressed/Changed HEP based on:   [] positioning   [] body mechanics   [] transfers   [] heat/ice application    [] other:      Other Objective/Functional Measures:   -5xSTS 17 sec with right hand, CGA  -30 sec standing without use of hands, CGA occasionally, No LOB after initial VC to find center of balance,  -unsupported standing with head turns, occasional LOB, CGA for safety  -mostly WB on right with unsupported standing, left knee held in flexed position     Pain Level (0-10 scale) post treatment: 6    ASSESSMENT/Changes in Function: improved ability to stand without external support however has tendency to lean posterior resulting in LOB, improved ability to maintain standing with VC to lean fwd, difficulty with left stance stability, deficit in quad/HS/glut activation jack left, slight improvement in left knee Ext with VC during WS activities, left arm non functional     Patient will continue to benefit from skilled PT services to address ROM deficits, address strength deficits, analyze and address soft tissue restrictions, analyze and cue movement patterns, assess and modify postural abnormalities, and imbalance  to attain remaining goals. [x]  See Plan of Care  []  See progress note/recertification  []  See Discharge Summary         Progress Towards Goals: Short Term Goals: To be accomplished in 4 weeks:  Patient will be independent and compliant with HEP to progress toward goals and restore functional mobility. Eval Status: issued at eval  Current: 7/14/22 reports daily compliance, updated HEP     Patient will improve FOTO score by 5 points to improve functional tolerance for exercise. Eval Status: FOTO 36   Current 6/23/22: 53 - MET  FOTO score = an established functional score where 100 = no disability     Patient will improve pain in bilateral hips to 5/10  to improve standing tolerance and restore prior level of function. Eval Status: 10/10 at worst  Current Status on 7/14/22: 7/10 at start of visit. Reports Increased pain with walking too much, 5/10 at a minimum (occasional)  Status on 7/26/22:max pain 7/10, min pain 5/10 right hip, progressing     Pt will increase MMT of hip strength by 1/5 in order to return to goals of improved walking.   Eval Status:  Hip Left (1-5) Right (1-5)   Hip Flexion 2- 3+   Hip ABD 2 3-   Hip ADD 3- 4-    Current 714/22: - prgrogressing  Hip Left (1-5) Right (1-5)   Hip Flexion 2+ 4   Hip ABD 2 3+   Hip ADD 3- 4      Pt will have 120 degrees of passive pain free hip flexion to aid in functional mechanics for ambulation/ADLs. Eval Status:   AROM                             PROM  Hip Left Right Left Right   Flexion 115 ! (propped) 105 ! 120 ! 110 ! ER 10 ! 20 ! 22 ! 20 ! IR 0 ! 40 ! 25 ! 40 ! Current 7/14/22: Flexion: Right: 122 Left PROM: 115 (AROM 65) Left ER: 12  PROM IR: 30 Right: ER: 28 degrees IR: 35 degrees  Status on 7/26/22: 145 deg functional hip Flexion in seated position when leaning fwd, MET     Long Term Goals: To be accomplished in 6 weeks:  Patient will improve FOTO score by 11 points to improve functional tolerance for ADLs. Eval Status: FOTO 36              Current 6/23/22: 53 - MET  FOTO score = an established functional score where 100 = no disability     2. Pt will be able to transfer sit to supine and supine to sit with no assistance to aid in functional mechanics for bed mobility. Eval Status: Sit-Supine: Min A for left LE              Supine-Sit: min A for left LE management  Current: 6/29/22 pt independent for sit to supine, independent with supine to sit. - MET     3. Pt will ambulate 100 ft with SPC and no LOB in order to be a safe household ambulator. (Modified)  Eval Status: Pt able to ambulate 10 ft with SPC, decreased step height and length on left LE. Current status 7/14/22: Pt able to ambulate total of 105 ft with SPC and SBA-CGA, pt with progressively faster gait speed and reduced foot clearance (pt with major LOB when stopping to sit in chair resulting in a decent to the floor)  Status on 7/26/22: deficit in standing and ambulatory imbalance persists, difficulty loading and stabilizing left leg, continues to overcompensate with right leg/side, continues to be at risk of falling, progressing     4.    Patient will improve pain in low back to 0/10 at worst to improve ADL tolerance and restore prior level of function. Eval Status: 10/10 at worst              Current Status on 7/26/22: 7/10 at start of visit. Reports Increased pain with walking too much, 5/10 at a minimum (occasional), progressing     New Goal:              Pt will decrease 5x STS time by 5 seconds in order to improve functional strength needed for transfers.               7/14/22: 5x STS: 33 seconds  Current status 7/26/22:5xSTS 17 sec with use of right hand and CGA, progressing    Updated goal as of 7/26/22: Patient demonstrates ability to perform active left hip Ext /MMT >or= 3/5 indicating improved left hip/glut strength needed for better stability during left stance phase when walking  Current status: marked difficulty to perform active left hip Ext during standing and retro walking, unable to perform prone hip Ext, MMT 2/5 left glut max         PLAN  []  Upgrade activities as tolerated     [x]  Continue plan of care for additional 6 weeks  []  Update interventions per flow sheet       []  Discharge due to:_  []  Other:_      Deo Rai PT 7/26/2022  7:53 AM    Future Appointments   Date Time Provider [place] Jj   7/28/2022  5:15 PM Jayce Dhillon, PT [id number]

## 2022-07-28 ENCOUNTER — APPOINTMENT (OUTPATIENT)
Dept: PHYSICAL THERAPY | Age: 57
End: 2022-07-28
Payer: MEDICARE

## 2022-08-11 ENCOUNTER — HOSPITAL ENCOUNTER (OUTPATIENT)
Dept: PHYSICAL THERAPY | Age: 57
Discharge: HOME | End: 2022-08-11
Payer: MEDICARE

## 2022-08-11 PROCEDURE — 97112 NEUROMUSCULAR REEDUCATION: CPT

## 2022-08-11 PROCEDURE — 97530 THERAPEUTIC ACTIVITIES: CPT

## 2022-08-11 PROCEDURE — 97116 GAIT TRAINING THERAPY: CPT

## 2022-08-11 NOTE — PROGRESS NOTES
PT DAILY TREATMENT NOTE    Patient Name: Thania Joseph  Date:2022  : 1965  [x]  Patient  Verified  Payor: Caryn  / Plan: Parvbethanie Thapa 8141 HMO / Product Type: Managed Care Medicare /    In time:7:47  Out time:8:30  Total Treatment Time (min): 43  Total Timed Codes (min): 43  1:1 Treatment Time (MC/BCBS only): 43   Visit #: 11 of 19    Treatment Dx: Right hip pain [M25.551]  Left hip pain [M25.552]    SUBJECTIVE  Pain Level (0-10 scale): 7  Any medication changes, allergies to medications, adverse drug reactions, diagnosis change, or new procedure performed?: [x] No    [] Yes (see summary sheet for update)  Subjective functional status/changes:   [] No changes reported  Pt reports that the hips don't like the mornings. Reports that Cortizone injection on the 17th in the right hip. Reports that she is compliant with HEP. Reports that she fell on 22 with loosing balance.      OBJECTIVE      3  NC min Therapeutic Exercise:  [] See flow sheet :   Rationale: increase ROM, increase strength, improve coordination, improve balance, and increase proprioception to improve the patients ability to perform daily activities with decreased pain and symptom levels      15 min Therapeutic Activity:  []  See flow sheet :   Rationale: increase ROM, increase strength, improve coordination, improve balance, and increase proprioception  to improve the patients ability to perform daily activities with decreased pain and symptom levels       15 min Neuromuscular Re-education:  []  See flow sheet :   Rationale: increase ROM, increase strength, improve coordination, improve balance, and increase proprioception  to improve the patients ability to perform daily activities with decreased pain and symptom levels      10 min Gait Training:  Pt ambulated in parallel bars with CGA to min Ax1 with verbal cuing for sequencing, hand placement, foot placement; exercises and techniques to improve gait Rationale: To improve ambulation safety and efficiency in order to improve patient's ability to safely ambulate at home for self care. With   [x] TE   [x] TA   [x] neuro   [] other: Patient Education: [x] Review HEP    [] Progressed/Changed HEP based on:   [x] positioning   [x] body mechanics   [x] transfers   [] heat/ice application    [x] other: Education on transfers from stand to sit to assure she feels the w/c with the legs and to reach back for the w/c prior to sitting. Other Objective/Functional Measures: Pt enters gym in no apparent distress, wheel chair, bilateral ankle braces     Pain Level (0-10 scale) post treatment: 7    ASSESSMENT/Changes in Function: Patient tolerated therapy session well as there were no adverse reactions today. Pt required frequent cuing with sit to stand to push up from the w/c and to reach back for the w/c with sitting. Pt with a tendency to lean backwards with standing. Pt was able to transfer with quad cane. Pt does need help occasionally with placement of left LE. Pt had a hard time with SB to the left stating that was harder than the other directions. Pts left knee would occasionally give out with standing techniques. Pt had the most difficulty with side stepping. Pt is progressing with therapy as indicated by pt tolerating increase in exercise repetitions and resistance. Although showing progress patient would benefit from continuation of skilled physical therapy to address the remaining limitations.        Patient will continue to benefit from skilled PT services to modify and progress therapeutic interventions, address functional mobility deficits, address ROM deficits, address strength deficits, analyze and address soft tissue restrictions, analyze and cue movement patterns, analyze and modify body mechanics/ergonomics, assess and modify postural abnormalities, address imbalance/dizziness, and instruct in home and community integration to attain remaining goals. [x]  See Plan of Care  []  See progress note/recertification  []  See Discharge Summary         Progress towards goals / Updated goals:  Short Term Goals: To be accomplished in 4 weeks:  Patient will be independent and compliant with HEP to progress toward goals and restore functional mobility. Eval Status: issued at eval  Last PN: 7/14/22 reports daily compliance, updated HEP  Current: 8/11/22 reports compliance     Patient will improve FOTO score by 5 points to improve functional tolerance for exercise. Eval Status: FOTO 36   Current 6/23/22: 53 - MET  FOTO score = an established functional score where 100 = no disability     Patient will improve pain in bilateral hips to 5/10  to improve standing tolerance and restore prior level of function. Eval Status: 10/10 at worst  Last PN 7/14/22: 7/10 at start of visit. Reports Increased pain with walking too much, 5/10 at a minimum (occasional)  Current: 8/11/22 rates pain 7/10     Pt will increase MMT of hip strength by 1/5 in order to return to goals of improved walking. Eval Status:  Hip Left (1-5) Right (1-5)   Hip Flexion 2- 3+   Hip ABD 2 3-   Hip ADD 3- 4-   Last PN 7/14/22: - prgrogressing  Hip Left (1-5) Right (1-5)   Hip Flexion 2+ 4   Hip ABD 2 3+   Hip ADD 3- 4   Current: 8/11/22 not formally assessed     Pt will have 120 degrees of passive pain free hip flexion to aid in functional mechanics for ambulation/ADLs. Eval Status:   AROM                             PROM  Hip Left Right Left Right   Flexion 115 ! (propped) 105 ! 120 ! 110 ! ER 10 ! 20 ! 22 ! 20 ! IR 0 ! 40 ! 25 ! 40 ! Last PN 7/14/22: Flexion: Right: 122 Left PROM: 115 (AROM 65) Left ER: 12  PROM IR: 30 Right: ER: 28 degrees IR: 35 degrees  Status on 7/26/22: 145 deg functional hip Flexion in seated position when leaning fwd, MET     Long Term Goals:  To be accomplished in 6 weeks:  Patient will improve FOTO score by 11 points to improve functional tolerance for ADLs.  Eval Status: FOTO 36              Current 6/23/22: 53 - MET  FOTO score = an established functional score where 100 = no disability     2. Pt will be able to transfer sit to supine and supine to sit with no assistance to aid in functional mechanics for bed mobility. Eval Status: Sit-Supine: Min A for left LE              Supine-Sit: min A for left LE management  Current: 6/29/22 pt independent for sit to supine, independent with supine to sit. - MET     3. Pt will ambulate 100 ft with SPC and no LOB in order to be a safe household ambulator. (Modified)  Eval Status: Pt able to ambulate 10 ft with SPC, decreased step height and length on left LE. Last PN 7/14/22: Pt able to ambulate total of 105 ft with SPC and SBA-CGA, pt with progressively faster gait speed and reduced foot clearance (pt with major LOB when stopping to sit in chair resulting in a decent to the floor)  Current: 8/11/22 Pt ambulated in the parallel bars with right UE support with verbal cuing for sequencing, hand placement, foot placement; performed exercises and techniques to improve gait     4. Patient will improve pain in low back to 0/10 at worst to improve ADL tolerance and restore prior level of function. Eval Status: 10/10 at worst              Current Status on 8/11/22 rates pain 7/10     New Goal:              Pt will decrease 5x STS time by 5 seconds in order to improve functional strength needed for transfers. Last PN: 7/14/22: 5x STS: 33 seconds  Current: 8/11/22 performed sit to stands throughout therapy session and pt required Education on transfers from stand to sit to assure she feels the w/c with the legs and to reach back for the w/c prior to sitting.      Updated goal as of 7/26/22: Patient demonstrates ability to perform active left hip Ext /MMT >or= 3/5 indicating improved left hip/glut strength needed for better stability during left stance phase when walking  Current status:  7/26/22 marked difficulty to perform active left hip Ext during standing and retro walking, unable to perform prone hip Ext, MMT 2/5 left glut max  Current: 8/11/22 not formally assessed         PLAN  []  Upgrade activities as tolerated     [x]  Continue plan of care  []  Update interventions per flow sheet       []  Discharge due to:_  []  Other:_      Felipe Merida, ELMO, DPT, [facility] 8/11/2022  7:45 AM    Future Appointments   Date Time Provider [place] Jj   8/23/2022  8:30 AM ELMO Garcia [facility]   8/30/2022  8:30 AM ELMO Garcia [facility]

## 2022-08-12 ENCOUNTER — HOSPITAL ENCOUNTER (OUTPATIENT)
Dept: PHYSICAL THERAPY | Age: 57
Discharge: HOME | End: 2022-08-12
Payer: MEDICARE

## 2022-08-12 PROCEDURE — 97116 GAIT TRAINING THERAPY: CPT

## 2022-08-12 PROCEDURE — 97112 NEUROMUSCULAR REEDUCATION: CPT

## 2022-08-12 PROCEDURE — 97110 THERAPEUTIC EXERCISES: CPT

## 2022-08-12 NOTE — PROGRESS NOTES
PT DAILY TREATMENT NOTE    Patient Name: Kristen Fill  Date:2022  : 1965  [x]  Patient  Verified  Payor: BLUE CROSS MEDICARE / Plan: Katrina Thapa 8141 HMO / Product Type: Managed Care Medicare /    In time:747  Out time:835  Total Treatment Time (min): 48  Total Timed Codes (min): 48  1:1 Treatment Time (MC/BCBS only): 48   Visit #: 11 of 19    Treatment Dx: Right hip pain [M25.551]  Left hip pain [M25.552]    SUBJECTIVE  Pain Level (0-10 scale): 6  Any medication changes, allergies to medications, adverse drug reactions, diagnosis change, or new procedure performed?: [x] No    [] Yes (see summary sheet for update)  Subjective functional status/changes:   [] No changes reported  \"Feel it on the outside of the hip. \"    OBJECTIVE    8 min Therapeutic Exercise:  [x] See flow sheet :   Rationale: increase ROM and increase strength to improve the patients ability to perform daily activities with decreased pain and symptom levels     22 min Neuromuscular Re-education:  [x]  See flow sheet :   Rationale: increase strength, improve coordination, improve balance, and increase proprioception  to improve the patients ability to perform daily activities with decreased pain and symptom levels      18 min Gait Training: in parallel bars forward/retro 2 laps with CGA to min A, weight shifting with emphasis on increasing weight throught left LE and anterior trunk lean   Rationale:  To improve ambulation safety and efficiency in order to improve patient's ability to safely ambulate at home for self care    With   [] TE   [] TA   [] neuro   [] other: Patient Education: [x] Review HEP    [] Progressed/Changed HEP based on:   [] positioning   [] body mechanics   [] transfers   [] heat/ice application    [] other:      Other Objective/Functional Measures:   Consistent cues to keep trunk forward with standing activities in parallel bars  5x STS from parallel bars 23.85 sec however decreased left UE WB  Min A to CGA with amb due to balance    Pain Level (0-10 scale) post treatment: 6    ASSESSMENT/Changes in Function: Pt tolerated session well with reporting no increase in pain post session. Focused on standing weight shifting and increased gali LE use with transfers today. Pt very challenged with anterior trunk lean with standing needing consistent cues to keep right hand ahead of hips on parallel bars. Patient will continue to benefit from skilled PT services to modify and progress therapeutic interventions, address functional mobility deficits, address ROM deficits, address strength deficits, analyze and cue movement patterns, analyze and modify body mechanics/ergonomics, assess and modify postural abnormalities, address imbalance/dizziness, and instruct in home and community integration to attain remaining goals. [x]  See Plan of Care  []  See progress note/recertification  []  See Discharge Summary         Progress towards goals / Updated goals:  Short Term Goals: To be accomplished in 4 weeks:  Patient will be independent and compliant with HEP to progress toward goals and restore functional mobility. Eval Status: issued at eval  Last PN: 7/14/22 reports daily compliance, updated HEP  Current: 8/11/22 reports compliance     Patient will improve FOTO score by 5 points to improve functional tolerance for exercise. Eval Status: FOTO 36   Current 6/23/22: 53 - MET  FOTO score = an established functional score where 100 = no disability     Patient will improve pain in bilateral hips to 5/10  to improve standing tolerance and restore prior level of function. Eval Status: 10/10 at worst  Last PN 7/14/22: 7/10 at start of visit. Reports Increased pain with walking too much, 5/10 at a minimum (occasional)  Current: 8/11/22 rates pain 7/10     Pt will increase MMT of hip strength by 1/5 in order to return to goals of improved walking.   Eval Status:  Hip Left (1-5) Right (1-5)   Hip Flexion 2- 3+   Hip ABD 2 3- Hip ADD 3- 4-   Last PN 7/14/22: - prgrogressing  Hip Left (1-5) Right (1-5)   Hip Flexion 2+ 4   Hip ABD 2 3+   Hip ADD 3- 4   Current: 8/11/22 not formally assessed     Pt will have 120 degrees of passive pain free hip flexion to aid in functional mechanics for ambulation/ADLs. Eval Status:   AROM                             PROM  Hip Left Right Left Right   Flexion 115 ! (propped) 105 ! 120 ! 110 ! ER 10 ! 20 ! 22 ! 20 ! IR 0 ! 40 ! 25 ! 40 ! Last PN 7/14/22: Flexion: Right: 122 Left PROM: 115 (AROM 65) Left ER: 12  PROM IR: 30 Right: ER: 28 degrees IR: 35 degrees  Status on 7/26/22: 145 deg functional hip Flexion in seated position when leaning fwd, MET     Long Term Goals: To be accomplished in 6 weeks:  Patient will improve FOTO score by 11 points to improve functional tolerance for ADLs. Eval Status: FOTO 36              Current 6/23/22: 53 - MET  FOTO score = an established functional score where 100 = no disability     2. Pt will be able to transfer sit to supine and supine to sit with no assistance to aid in functional mechanics for bed mobility. Eval Status: Sit-Supine: Min A for left LE              Supine-Sit: min A for left LE management  Current: 6/29/22 pt independent for sit to supine, independent with supine to sit. - MET     3. Pt will ambulate 100 ft with SPC and no LOB in order to be a safe household ambulator. (Modified)  Eval Status: Pt able to ambulate 10 ft with SPC, decreased step height and length on left LE. Last PN 7/14/22: Pt able to ambulate total of 105 ft with SPC and SBA-CGA, pt with progressively faster gait speed and reduced foot clearance (pt with major LOB when stopping to sit in chair resulting in a decent to the floor)  Current: 8/11/22 Pt ambulated in the parallel bars with right UE support with verbal cuing for sequencing, hand placement, foot placement; performed exercises and techniques to improve gait     4.    Patient will improve pain in low back to 0/10 at worst to improve ADL tolerance and restore prior level of function. Eval Status: 10/10 at worst              Current Status on 8/11/22 rates pain 7/10     New Goal:              Pt will decrease 5x STS time by 5 seconds in order to improve functional strength needed for transfers.               Last PN: 7/14/22: 5x STS: 33 seconds  Current: 23.85 sec from parallel bars, decreased WB through left LE progressing 8/12/22     Updated goal as of 7/26/22: Patient demonstrates ability to perform active left hip Ext /MMT >or= 3/5 indicating improved left hip/glut strength needed for better stability during left stance phase when walking  Current status:  7/26/22 marked difficulty to perform active left hip Ext during standing and retro walking, unable to perform prone hip Ext, MMT 2/5 left glut max  Current: 8/11/22 not formally assessed            PLAN  []  Upgrade activities as tolerated     [x]  Continue plan of care  []  Update interventions per flow sheet       []  Discharge due to:_  []  Other:_      Tamia Soliman 8/12/2022  7:33 AM    Future Appointments   Date Time Provider Marty Jj   8/12/2022  7:45 AM Jay Calle [facility]   8/23/2022  7:45 AM Valentino Faster, PT [facility]   8/30/2022  7:45 AM Phill Cogan, ELMO [facility]

## 2022-08-19 ENCOUNTER — HOSPITAL ENCOUNTER (OUTPATIENT)
Dept: PHYSICAL THERAPY | Age: 57
Discharge: HOME | End: 2022-08-19
Payer: MEDICARE

## 2022-08-19 PROCEDURE — 97530 THERAPEUTIC ACTIVITIES: CPT

## 2022-08-19 PROCEDURE — 97110 THERAPEUTIC EXERCISES: CPT

## 2022-08-19 PROCEDURE — 97116 GAIT TRAINING THERAPY: CPT

## 2022-08-19 NOTE — PROGRESS NOTES
[facility] at the [address], [place], [address] [facility]  Phone: [phone]      Fax:  [phone]    Continued Plan of Care/ Re-certification for Physical Therapy Services    Patient name: Suhas Huddleston Start of Care: 22   Referral source: Nereyda Voss MD : 1965   Medical/Treatment Diagnosis: Right hip pain [M25.551]  Left hip pain [M25.552] Onset Date:22     Prior Hospitalization: see medical history Provider#: [id number]   Medications: Verified on Patient Summary List    Comorbidities:  Spina bifida, Stroke when she was 7, Depression, arthritis, Left RENATO in , revision    Prior Level of Function: Pt mainly wc bound but was able to walk  ft with SPC. Independent with WC.     Visits from Start of Care: 12    Missed Visits: 1    Reporting Period: 22 to 22    The Plan of Care and following information is based on the patient's current status:    Key functional changes: overall reduction in max right hip pain, increasing ease with bed mobility and sit to stand transfers, improved hip ROM, improved functional tolerance to ADLs with meeting FOTO goal    Problems/ barriers to goal attainment: decreased bilateral hip strength, decreased WB through left LE with transfers and gait, CGA to min A needed for balance with gait with SPC, increased time for age group for 5x STS     Problem List: pain affecting function, decrease ROM, decrease strength, impaired gait/ balance, decrease ADL/ functional abilitiies, decrease activity tolerance, decrease flexibility/ joint mobility, and decrease transfer abilities    Treatment Plan: Therapeutic exercise, Therapeutic activities, Neuromuscular re-education, Physical agent/modality, Gait/balance training, Patient education, Self Care training, Functional mobility training, Home safety training, and Stair training     Goals for this certification period to be accomplished in 6 weeks:  Short Term Goals: To be accomplished in 4 weeks:  Patient will be independent and compliant with HEP to progress toward goals and restore functional mobility. Eval Status: issued at eval  Last PN: 7/14/22 reports daily compliance, updated HEP  Current: compliance daily per pt report goal MET 8/19/22     Patient will improve FOTO score by 5 points to improve functional tolerance for exercise. Eval Status: FOTO 36   Current 6/23/22: 53 - MET  FOTO score = an established functional score where 100 = no disability     Patient will improve pain in bilateral hips to 5/10  to improve standing tolerance and restore prior level of function. Eval Status: 10/10 at worst  Last PN 7/14/22: 7/10 at start of visit. Reports Increased pain with walking too much, 5/10 at a minimum (occasional)  Current: 8/10 max pain, 6/10 at best progressing 8/19/22     Pt will increase MMT of hip strength by 1/5 in order to return to goals of improved walking. Eval Status:  Hip Left (1-5) Right (1-5)   Hip Flexion 2- 3+   Hip ABD 2 3-   Hip ADD 3- 4-   Last PN 7/14/22: - prgrogressing  Hip Left (1-5) Right (1-5)   Hip Flexion 2+ 4   Hip ABD 2 3+   Hip ADD 3- 4      Current: slow progress 8/19/22  Hip Left (1-5) Right (1-5)   Hip Flexion 2+ 3+   Hip ABD 2 3   Hip ADD 3- 4            Pt will have 120 degrees of passive pain free hip flexion to aid in functional mechanics for ambulation/ADLs. Eval Status:   AROM                             PROM  Hip Left Right Left Right   Flexion 115 ! (propped) 105 ! 120 ! 110 ! ER 10 ! 20 ! 22 ! 20 ! IR 0 ! 40 ! 25 ! 40 ! Last PN 7/14/22: Flexion: Right: 122 Left PROM: 115 (AROM 65) Left ER: 12  PROM IR: 30 Right: ER: 28 degrees IR: 35 degrees  Current:  7/26/22: 145 deg functional hip Flexion in seated position when leaning fwd, MET     Long Term Goals: To be accomplished in 6 weeks:  Patient will improve FOTO score by 11 points to improve functional tolerance for ADLs.   Eval Status: FOTO 36 Current 6/23/22: 53 - MET  FOTO score = an established functional score where 100 = no disability     2. Pt will be able to transfer sit to supine and supine to sit with no assistance to aid in functional mechanics for bed mobility. Eval Status: Sit-Supine: Min A for left LE              Supine-Sit: min A for left LE management  Current: 6/29/22 pt independent for sit to supine, independent with supine to sit. - MET     3. Pt will ambulate 100 ft with SPC and no LOB in order to be a safe household ambulator. (Modified)  Eval Status: Pt able to ambulate 10 ft with SPC, decreased step height and length on left LE. Last PN 7/14/22: Pt able to ambulate total of 105 ft with SPC and SBA-CGA, pt with progressively faster gait speed and reduced foot clearance (pt with major LOB when stopping to sit in chair resulting in a decent to the floor)  Current: 70ft and 88ft today with SPC with CGA to min A due to occassional LOB, cues needed to increase use of left LE by increasing right step length at times progressing 8/19/22     4. Patient will improve pain in low back to 0/10 at worst to improve ADL tolerance and restore prior level of function. Eval Status: 10/10 at worst              Current: 8/10 max pain progressing 8/19/22     New Goal:              Pt will decrease 5x STS time by 5 seconds in order to improve functional strength needed for transfers.               Last PN: 7/14/22: 5x STS: 33 seconds  Current: 23.85 sec from parallel bars, decreased WB through left LE progressing 8/12/22     Updated goal as of 7/26/22: Patient demonstrates ability to perform active left hip Ext /MMT >or= 3/5 indicating improved left hip/glut strength needed for better stability during left stance phase when walking  Current status:  7/26/22 marked difficulty to perform active left hip Ext during standing and retro walking, unable to perform prone hip Ext, MMT 2/5 left glut max  Current: unable to lay in prone today, able to clear bottom with bridge with adduction progressing 8/19/22     Frequency / Duration: Patient to be seen 2 times per week for 6 weeks:    Assessment / Recommendations:Pt presented to therapy with c/c c/o bilateral hip pain and low back pain. Past medical history is significant for spina bifida and left sided stroke. . Pt has attended 12 sessions including initial eval with making good progress towards goals with demonstrating improved transfers, improved endurance with ability to walk longer prior to fatigue and hip pain, less overall pain in right hip, and improved hip ROM. Pt still reporting max pain 8/10 in right hip however able to decrease with hip shifting and right reaching activities. Although STS transfers have improved cues still needed to increase WB on left LE and decreased time with 5x STS compared to age range norm. Able to walk up to 88ft today with [facility] however min A needed at times due to LOB. Pt would benefit from continued skilled PT services to address remaining unmet goals and above deficits to allow pt to complete ADLs with increased ease. New Certification Period: 8/19/22 - 10/18/22      Douglas FERNANDO Remesic 8/19/2022 7:42 AM    ________________________________________________________________________  I certify that the above Therapy Services are being furnished while the patient is under my care. I agree with the treatment plan and certify that this therapy is necessary. [] I have read the above and request that my patient continue as recommended.   [] I have read the above report and request that my patient continue therapy with the following changes/special instructions: _______________________________________  [] I have read the above report and request that my patient be discharged from therapy    Physician's Signature:____________________ Date:_________ TIME:________                                      Anish Burt MD      ** Signature, Date and Time must be completed for valid certification **    Please sign and return to [facility] at the [address], [facility], [address] [facility]  Phone: [phone]      Fax:  [phone]

## 2022-08-19 NOTE — PROGRESS NOTES
PT DAILY TREATMENT NOTE    Patient Name: Mayuri Richards  Date:2022  : 1965  [x]  Patient  Verified  Payor: BLUE CROSS MEDICARE / Plan: Parvbethanie Thapa 8141 HMO / Product Type: Managed Care Medicare /    In time:831  Out time:917  Total Treatment Time (min): 46  Total Timed Codes (min): 46  1:1 Treatment Time (MC/BCBS only): 55   Visit #: 12 of 19    Treatment Dx: Right hip pain [M25.551]  Left hip pain [M25.552]    SUBJECTIVE  Pain Level (0-10 scale): 7  Any medication changes, allergies to medications, adverse drug reactions, diagnosis change, or new procedure performed?: [x] No    [] Yes (see summary sheet for update)  Subjective functional status/changes:   [] No changes reported  \"Got a cortosine shot on Tuesday in the right hip, no difference yet. \"    OBJECTIVE        21 min Therapeutic Exercise:  [x] See flow sheet :   Rationale: increase ROM and increase strength to improve the patients ability to perform daily activities with decreased pain and symptom levels    15 min Therapeutic Activity:  [x]  See flow sheet :   Rationale: increase ROM, increase strength, improve coordination, and increase proprioception  to improve the patients ability to perform daily activities with decreased pain and symptom levels     10 min Gait Traininft, 88ft with SPC with min A with second person wc follow with emphasis on increasing right step length and left LE WB   Rationale: To improve ambulation safety and efficiency in order to improve patient's ability to safely ambulate at home for self care.            With   [] TE   [] TA   [] neuro   [] other: Patient Education: [x] Review HEP    [] Progressed/Changed HEP based on:   [] positioning   [] body mechanics   [] transfers   [] heat/ice application    [] other:      Other Objective/Functional Measures: see goals below  Min A at times with amb with SPC due to LOB  Tactile cues with STS to increase left LE WB  Decreased height with right glut s/l crossover       Pain Level (0-10 scale) post treatment: 5    ASSESSMENT/Changes in Function: Pt presented to therapy with c/c c/o bilateral hip pain and low back pain. Past medical history is significant for spina bifida and left sided stroke. . Pt has attended 12 sessions including initial eval with making good progress towards goals with demonstrating improved transfers, improved endurance with ability to walk longer prior to fatigue and hip pain, less overall pain in right hip, and improved hip ROM. Pt still reporting max pain 8/10 in right hip however able to decrease with hip shifting and right reaching activities. Although STS transfers have improved cues still needed to increase WB on left LE and decreased time with 5x STS compared to age range norm. Able to walk up to 88ft today with [facility] however min A needed at times due to LOB. Pt would benefit from continued skilled PT services to address remaining unmet goals and above deficits to allow pt to complete ADLs with increased ease. Patient will continue to benefit from skilled PT services to modify and progress therapeutic interventions, address functional mobility deficits, address strength deficits, analyze and cue movement patterns, analyze and modify body mechanics/ergonomics, assess and modify postural abnormalities, address imbalance/dizziness, and instruct in home and community integration to attain remaining goals. [x]  See Plan of Care  []  See progress note/recertification  []  See Discharge Summary         Progress towards goals / Updated goals:  Short Term Goals: To be accomplished in 4 weeks:  Patient will be independent and compliant with HEP to progress toward goals and restore functional mobility. Eval Status: issued at eval  Last PN: 7/14/22 reports daily compliance, updated HEP  Current: compliance daily per pt report goal MET 8/19/22     Patient will improve FOTO score by 5 points to improve functional tolerance for exercise.   Eval Status: FOTO 36   Current 6/23/22: 53 - MET  FOTO score = an established functional score where 100 = no disability     Patient will improve pain in bilateral hips to 5/10  to improve standing tolerance and restore prior level of function. Eval Status: 10/10 at worst  Last PN 7/14/22: 7/10 at start of visit. Reports Increased pain with walking too much, 5/10 at a minimum (occasional)  Current: 8/10 max pain, 6/10 at best progressing 8/19/22     Pt will increase MMT of hip strength by 1/5 in order to return to goals of improved walking. Eval Status:  Hip Left (1-5) Right (1-5)   Hip Flexion 2- 3+   Hip ABD 2 3-   Hip ADD 3- 4-   Last PN 7/14/22: - prgrogressing  Hip Left (1-5) Right (1-5)   Hip Flexion 2+ 4   Hip ABD 2 3+   Hip ADD 3- 4     Current: slow progress 8/19/22  Hip Left (1-5) Right (1-5)   Hip Flexion 2+ 3+   Hip ABD 2 3   Hip ADD 3- 4          Pt will have 120 degrees of passive pain free hip flexion to aid in functional mechanics for ambulation/ADLs. Eval Status:   AROM                             PROM  Hip Left Right Left Right   Flexion 115 ! (propped) 105 ! 120 ! 110 ! ER 10 ! 20 ! 22 ! 20 ! IR 0 ! 40 ! 25 ! 40 ! Last PN 7/14/22: Flexion: Right: 122 Left PROM: 115 (AROM 65) Left ER: 12  PROM IR: 30 Right: ER: 28 degrees IR: 35 degrees  Current:  7/26/22: 145 deg functional hip Flexion in seated position when leaning fwd, MET     Long Term Goals: To be accomplished in 6 weeks:  Patient will improve FOTO score by 11 points to improve functional tolerance for ADLs. Eval Status: FOTO 36              Current 6/23/22: 53 - MET  FOTO score = an established functional score where 100 = no disability     2. Pt will be able to transfer sit to supine and supine to sit with no assistance to aid in functional mechanics for bed mobility.   Eval Status: Sit-Supine: Min A for left LE              Supine-Sit: min A for left LE management  Current: 6/29/22 pt independent for sit to supine, independent with supine to sit. - MET     3. Pt will ambulate 100 ft with SPC and no LOB in order to be a safe household ambulator. (Modified)  Eval Status: Pt able to ambulate 10 ft with SPC, decreased step height and length on left LE. Last PN 7/14/22: Pt able to ambulate total of 105 ft with SPC and SBA-CGA, pt with progressively faster gait speed and reduced foot clearance (pt with major LOB when stopping to sit in chair resulting in a decent to the floor)  Current: 70ft and 88ft today with SPC with CGA to min A due to occassional LOB, cues needed to increase use of left LE by increasing right step length at times progressing 8/19/22     4. Patient will improve pain in low back to 0/10 at worst to improve ADL tolerance and restore prior level of function. Eval Status: 10/10 at worst              Current: 8/10 max pain progressing 8/19/22     New Goal:              Pt will decrease 5x STS time by 5 seconds in order to improve functional strength needed for transfers.               Last PN: 7/14/22: 5x STS: 33 seconds  Current: 23.85 sec from parallel bars, decreased WB through left LE progressing 8/12/22     Updated goal as of 7/26/22: Patient demonstrates ability to perform active left hip Ext /MMT >or= 3/5 indicating improved left hip/glut strength needed for better stability during left stance phase when walking  Current status:  7/26/22 marked difficulty to perform active left hip Ext during standing and retro walking, unable to perform prone hip Ext, MMT 2/5 left glut max  Current: unable to lay in prone today, able to clear bottom with bridge with adduction progressing 8/19/22       PLAN  []  Upgrade activities as tolerated     [x]  Continue plan of care  []  Update interventions per flow sheet       []  Discharge due to:_  []  Other:_      Shyla Calle 8/19/2022  7:39 AM    Future Appointments   Date Time Provider Marty Jj   8/19/2022  8:30 AM Shyla Calle [facility]PTBW [facility]   8/23/2022  7:45 AM Luan Amadou Kong [facility]UD[facility]   8/30/2022  7:45 AM ELMO AbbasiELMER THE [facility]

## 2022-08-23 ENCOUNTER — APPOINTMENT (OUTPATIENT)
Dept: PHYSICAL THERAPY | Age: 57
End: 2022-08-23
Payer: MEDICARE

## 2022-08-30 ENCOUNTER — HOSPITAL ENCOUNTER (OUTPATIENT)
Dept: PHYSICAL THERAPY | Age: 57
Discharge: HOME | End: 2022-08-30
Payer: MEDICARE

## 2022-08-30 PROCEDURE — 97112 NEUROMUSCULAR REEDUCATION: CPT

## 2022-08-30 PROCEDURE — 97116 GAIT TRAINING THERAPY: CPT

## 2022-08-30 PROCEDURE — 97110 THERAPEUTIC EXERCISES: CPT

## 2022-08-30 NOTE — PROGRESS NOTES
PT DAILY TREATMENT NOTE    Patient Name: Laine Nowak  Date:2022  : 1965  [x]  Patient  Verified  Payor: Adan Bales / Plan: Katrina Thapa [id number] HMO / Product Type: Managed Care Medicare /    In time:745  Out time:830  Total Treatment Time (min): 45  Total Timed Codes (min): 45  1:1 Treatment Time (MC/BCBS only): 45   Visit #: 11 of 19    Treatment Dx: Right hip pain [M25.551]  Left hip pain [M25.552]    SUBJECTIVE  Pain Level (0-10 scale): 7  Any medication changes, allergies to medications, adverse drug reactions, diagnosis change, or new procedure performed?: [x] No    [] Yes (see summary sheet for update)  Subjective functional status/changes:   [] No changes reported  Patient reports 2 recent falls . One occurring just when walking and one when negotiating curb. Most pain right hip. No pain due to falling. OBJECTIVE      15 min Therapeutic Exercise:  [x] See flow sheet :   Rationale: increase ROM, increase strength, and improve coordination to improve the patients ability to perform ADL         20 min Neuromuscular Re-education:  [x]  See flow sheet :   Rationale: increase strength, improve coordination, improve balance, and increase proprioception  to improve the patients ability to perform ADL        10 min Gait Trainin feet with quad cane on level surfaces with min level of assistance, WS activities, facilitation of left lateralization   Rationale: To improve ambulation safety and efficiency in order to improve patient's ability to safely ambulate at home for self care.              With   [x] TE   [] TA   [] neuro   [] other: Patient Education: [x] Review HEP    [] Progressed/Changed HEP based on:   [] positioning   [] body mechanics   [] transfers   [] heat/ice application    [] other:      Other Objective/Functional Measures:   Patient unable to maintain left knee alignment during standing activities, needs frequent VC for correction  Significant difficulty with left lateral WS in standing, instability with left stance  Patient continues to be at fall risk  Left arm non functional  Ability to hold unsupported standing for 10 sec with SBA     Pain Level (0-10 scale) post treatment: 6    ASSESSMENT/Changes in Function: fatigue with walking, very challenged with lateralization to left and left stance stability    Patient will continue to benefit from skilled PT services to address ROM deficits, address strength deficits, analyze and address soft tissue restrictions, analyze and cue movement patterns, and assess and modify postural abnormalities to attain remaining goals. [x]  See Plan of Care  []  See progress note/recertification  []  See Discharge Summary         Progress towards goals / Updated goals:  Goals for this certification period to be accomplished in 6 weeks:  Short Term Goals: To be accomplished in 4 weeks:  Patient will be independent and compliant with HEP to progress toward goals and restore functional mobility. Eval Status: issued at eval  Last PN: 7/14/22 reports daily compliance, updated HEP  Current: compliance daily per pt report goal MET 8/19/22     Patient will improve FOTO score by 5 points to improve functional tolerance for exercise. Eval Status: FOTO 36   Current 6/23/22: 53 - MET  FOTO score = an established functional score where 100 = no disability     Patient will improve pain in bilateral hips to 5/10  to improve standing tolerance and restore prior level of function. Eval Status: 10/10 at worst  Last PN 7/14/22: 7/10 at start of visit. Reports Increased pain with walking too much, 5/10 at a minimum (occasional)  Current: 8/10 max pain, 6/10 at best progressing 8/19/22     Pt will increase MMT of hip strength by 1/5 in order to return to goals of improved walking.   Eval Status:  Hip Left (1-5) Right (1-5)   Hip Flexion 2- 3+   Hip ABD 2 3-   Hip ADD 3- 4-   Last PN 7/14/22: - prgrogressing  Hip Left (1-5) Right (1-5)   Hip Flexion 2+ 4   Hip ABD 2 3+   Hip ADD 3- 4      Current: slow progress 8/19/22  Hip Left (1-5) Right (1-5)   Hip Flexion 2+ 3+   Hip ABD 2 3   Hip ADD 3- 4            Pt will have 120 degrees of passive pain free hip flexion to aid in functional mechanics for ambulation/ADLs. Eval Status:   AROM                             PROM  Hip Left Right Left Right   Flexion 115 ! (propped) 105 ! 120 ! 110 ! ER 10 ! 20 ! 22 ! 20 ! IR 0 ! 40 ! 25 ! 40 ! Last PN 7/14/22: Flexion: Right: 122 Left PROM: 115 (AROM 65) Left ER: 12  PROM IR: 30 Right: ER: 28 degrees IR: 35 degrees  Current:  7/26/22: 145 deg functional hip Flexion in seated position when leaning fwd, MET     Long Term Goals: To be accomplished in 6 weeks:  Patient will improve FOTO score by 11 points to improve functional tolerance for ADLs. Eval Status: FOTO 36              Current 6/23/22: 53 - MET  FOTO score = an established functional score where 100 = no disability     2. Pt will be able to transfer sit to supine and supine to sit with no assistance to aid in functional mechanics for bed mobility. Eval Status: Sit-Supine: Min A for left LE              Supine-Sit: min A for left LE management  Current: 6/29/22 pt independent for sit to supine, independent with supine to sit. - MET     3. Pt will ambulate 100 ft with SPC and no LOB in order to be a safe household ambulator. (Modified)  Eval Status: Pt able to ambulate 10 ft with SPC, decreased step height and length on left LE. Last PN 7/14/22: Pt able to ambulate total of 105 ft with SPC and SBA-CGA, pt with progressively faster gait speed and reduced foot clearance (pt with major LOB when stopping to sit in chair resulting in a decent to the floor)  Current: 70ft and 88ft today with SPC with CGA to min A due to occassional LOB, cues needed to increase use of left LE by increasing right step length at times progressing 8/19/22     4.    Patient will improve pain in low back to 0/10 at worst to improve ADL tolerance and restore prior level of function. Eval Status: 10/10 at worst              Current: 8/10 max pain progressing 8/19/22     New Goal:              Pt will decrease 5x STS time by 5 seconds in order to improve functional strength needed for transfers.               Last PN: 7/14/22: 5x STS: 33 seconds  Current: 23.85 sec from parallel bars, decreased WB through left LE progressing 8/12/22     Updated goal as of 7/26/22: Patient demonstrates ability to perform active left hip Ext /MMT >or= 3/5 indicating improved left hip/glut strength needed for better stability during left stance phase when walking  Current status:  7/26/22 marked difficulty to perform active left hip Ext during standing and retro walking, unable to perform prone hip Ext, MMT 2/5 left glut max  Current: unable to lay in prone today, able to clear bottom with bridge with adduction progressing 8/19/22         PLAN  []  Upgrade activities as tolerated     [x]  Continue plan of care  []  Update interventions per flow sheet       []  Discharge due to:_  []  Other:_      Uma Valente, PT 8/30/2022  7:50 AM    Future Appointments   Date Time Provider Marty Jj   9/6/2022  7:30 AM Remcristofer, Shyla Xiaos [facility]PTBW [facility]   9/7/2022  7:30 AM Rosario Loges, PT [facility]   9/12/2022  7:30 AM Graylon [place], PT [facility]PTBW [facility]   9/14/2022  7:30 AM Rosario Loges, PT [facility]   9/19/2022  7:30 AM Graylon Thong, PT [facility]   9/21/2022  7:30 AM Rosario Loges, PT [facility]   9/26/2022  7:30 AM Graylon Thong, PT [facility]   9/28/2022  7:30 AM Rosario Loges, PT [facility]

## 2022-09-06 ENCOUNTER — HOSPITAL ENCOUNTER (OUTPATIENT)
Dept: PHYSICAL THERAPY | Age: 57
Discharge: HOME | End: 2022-09-06
Payer: MEDICARE

## 2022-09-06 PROCEDURE — 97116 GAIT TRAINING THERAPY: CPT

## 2022-09-06 PROCEDURE — 97112 NEUROMUSCULAR REEDUCATION: CPT

## 2022-09-06 PROCEDURE — 97530 THERAPEUTIC ACTIVITIES: CPT

## 2022-09-06 NOTE — PROGRESS NOTES
PT DAILY TREATMENT NOTE    Patient Name: Aiden Zhang  Date:2022  : 1965  [x]  Patient  Verified  Payor: BLUE CROSS MEDICARE / Plan: Katrina Thapa 8141 HMO / Product Type: Managed Care Medicare /    In time:730  Out time:820  Total Treatment Time (min): 50  Total Timed Codes (min): 46  1:1 Treatment Time (MC/BCBS only): 55   Visit #: 14 of 19    Treatment Dx: Right hip pain [M25.551]  Left hip pain [M25.552]    SUBJECTIVE  Pain Level (0-10 scale): 6  Any medication changes, allergies to medications, adverse drug reactions, diagnosis change, or new procedure performed?: [x] No    [] Yes (see summary sheet for update)  Subjective functional status/changes:   [] No changes reported  \"I did have a fall this weekend getting out of bed and my feet twisted and fell but my  was able to catch me. \"    OBJECTIVE    18 min Therapeutic Activity:  [x]  See flow sheet :   Rationale: increase ROM and increase strength  to improve the patients ability to perform daily activities with decreased pain and symptom levels     22 min Neuromuscular Re-education:  [x]  See flow sheet :   Rationale: increase strength, improve coordination, improve balance, and increase proprioception  to improve the patients ability to perform daily activities with decreased pain and symptom levels    10 min Gait Trainin feet with quad cane on level surfaces with min level of assistance, WS activities, facilitation of left lateralization   Rationale: To improve ambulation safety and efficiency in order to improve patient's ability to safely ambulate at home for self care.      With   [] TE   [] TA   [] neuro   [] other: Patient Education: [x] Review HEP    [] Progressed/Changed HEP based on:   [] positioning   [] body mechanics   [] transfers   [] heat/ice application    [] other:      Other Objective/Functional Measures:   CGA to min A with balance in parallel   CGA with gait 50ft with 1 LOB due to pivoting on foot  5x STS: 34.17 sec with decreased push off left LE and pushing off back of table with bilateral LE, second trial 30.41sec       Pain Level (0-10 scale) post treatment: 5    ASSESSMENT/Changes in Function: Pt tolerated session well with reporting slight reduction in right hip pain post session. Pt reporting fall over the weekend with transitioning from bed to chair possibly due to poor weight shifting on left LE. Pt continues to be very challenged with weight shifting to left LE upon standing and with sit to stand transfers. CGA still needed with all standing activities due to fall risk with poor balance. Patient will continue to benefit from skilled PT services to modify and progress therapeutic interventions, address functional mobility deficits, address ROM deficits, address strength deficits, analyze and cue movement patterns, analyze and modify body mechanics/ergonomics, assess and modify postural abnormalities, address imbalance/dizziness, and instruct in home and community integration to attain remaining goals. [x]  See Plan of Care  []  See progress note/recertification  []  See Discharge Summary         Progress towards goals / Updated goals:  Short Term Goals: To be accomplished in 4 weeks:  Patient will be independent and compliant with HEP to progress toward goals and restore functional mobility. Eval Status: issued at eval  Last PN:  compliance daily per pt report goal MET 8/19/22     Patient will improve FOTO score by 5 points to improve functional tolerance for exercise. Eval Status: FOTO 36   Current 6/23/22: 53 - MET  FOTO score = an established functional score where 100 = no disability     Patient will improve pain in bilateral hips to 5/10  to improve standing tolerance and restore prior level of function.   Eval Status: 10/10 at worst  Last PN 8/10 max pain, 6/10 at best progressing 8/19/22     Pt will increase MMT of hip strength by 1/5 in order to return to goals of improved walking. Eval Status:  Hip Left (1-5) Right (1-5)   Hip Flexion 2- 3+   Hip ABD 2 3-   Hip ADD 3- 4-   Last PN slow progress 8/19/22  Hip Left (1-5) Right (1-5)   Hip Flexion 2+ 3+   Hip ABD 2 3   Hip ADD 3- 4            Pt will have 120 degrees of passive pain free hip flexion to aid in functional mechanics for ambulation/ADLs. Eval Status:   AROM                             PROM  Hip Left Right Left Right   Flexion 115 ! (propped) 105 ! 120 ! 110 ! ER 10 ! 20 ! 22 ! 20 ! IR 0 ! 40 ! 25 ! 40 ! Last PN  7/26/22: 145 deg functional hip Flexion in seated position when leaning fwd, MET     Long Term Goals: To be accomplished in 6 weeks:  Patient will improve FOTO score by 11 points to improve functional tolerance for ADLs. Eval Status: FOTO 36              Current 6/23/22: 53 - MET  FOTO score = an established functional score where 100 = no disability     2. Pt will be able to transfer sit to supine and supine to sit with no assistance to aid in functional mechanics for bed mobility. Eval Status: Sit-Supine: Min A for left LE              Supine-Sit: min A for left LE management  Current: 6/29/22 pt independent for sit to supine, independent with supine to sit. - MET     3. Pt will ambulate 100 ft with SPC and no LOB in order to be a safe household ambulator. (Modified)  Eval Status: Pt able to ambulate 10 ft with SPC, decreased step height and length on left LE. Last PN  70ft and 88ft today with SPC with CGA to min A due to occassional LOB, cues needed to increase use of left LE by increasing right step length at times progressing 8/19/22     4. Patient will improve pain in low back to 0/10 at worst to improve ADL tolerance and restore prior level of function.   Eval Status: 10/10 at worst             Last PN8/10 max pain progressing 8/19/22  Current: 6/10 pain entering clinic today progressing 9/6/22     New Goal:              Pt will decrease 5x STS time by 5 seconds in order to improve functional strength needed for transfers.               Last PN:  23.85 sec from parallel bars, decreased WB through left LE progressing 8/12/22  Current: 30 .41 sec from 18in, increased WB on right LE regression 9/6/22     Updated goal as of 7/26/22: Patient demonstrates ability to perform active left hip Ext /MMT >or= 3/5 indicating improved left hip/glut strength needed for better stability during left stance phase when walking  Last PN unable to lay in prone today, able to clear bottom with bridge with adduction progressing 8/19/22      PLAN  []  Upgrade activities as tolerated     [x]  Continue plan of care  []  Update interventions per flow sheet       []  Discharge due to:_  []  Other:_      Kyle Fortis 9/6/2022  7:23 AM    Future Appointments   Date Time Provider Marty Jj   9/6/2022  7:30 AM Isamar Calle [facility]PTBW [facility]   9/7/2022  7:30 AM Wally Maya PT [facility]   9/12/2022  7:30 AM Bear Mcmillan PT [facility]   9/14/2022  7:30 AM Wally Maya PT [facility]   9/19/2022  7:30 AM Bear Mcmillan PT [facility]   9/21/2022  7:30 AM Wally Maya PT [facility]   9/26/2022  7:30 AM Bear Mcmillan PT [facility]   9/28/2022  7:30 AM Wally Maya PT [facility]

## 2022-09-07 ENCOUNTER — APPOINTMENT (OUTPATIENT)
Dept: PHYSICAL THERAPY | Age: 57
End: 2022-09-07
Payer: MEDICARE

## 2022-09-08 ENCOUNTER — APPOINTMENT (OUTPATIENT)
Dept: PHYSICAL THERAPY | Age: 57
End: 2022-09-08
Payer: MEDICARE

## 2022-09-14 ENCOUNTER — APPOINTMENT (OUTPATIENT)
Dept: PHYSICAL THERAPY | Age: 57
End: 2022-09-14
Payer: MEDICARE

## 2022-09-19 ENCOUNTER — HOSPITAL ENCOUNTER (OUTPATIENT)
Dept: PHYSICAL THERAPY | Age: 57
Discharge: HOME | End: 2022-09-19
Payer: MEDICARE

## 2022-09-19 PROCEDURE — 97110 THERAPEUTIC EXERCISES: CPT

## 2022-09-19 PROCEDURE — 97112 NEUROMUSCULAR REEDUCATION: CPT

## 2022-09-19 PROCEDURE — 97116 GAIT TRAINING THERAPY: CPT

## 2022-09-19 NOTE — PROGRESS NOTES
PT DAILY TREATMENT NOTE    Patient Name: Lisa Palomares  Date:2022  : 1965  [x]  Patient  Verified  Payor: BLUE CROSS MEDICARE / Plan: Katrina Thapa 8141 HMO / Product Type: Managed Care Medicare /    In time:730  Out time:819  Total Treatment Time (min): 49  Total Timed Codes (min): 49  1:1 Treatment Time (MC/BCBS only): 45   Visit #: 16 of 19    Treatment Dx: Right hip pain [M25.551]  Left hip pain [M25.552]    SUBJECTIVE  Pain Level (0-10 scale): 4  Any medication changes, allergies to medications, adverse drug reactions, diagnosis change, or new procedure performed?: [x] No    [] Yes (see summary sheet for update)  Subjective functional status/changes:   [] No changes reported  Reports fall last weekend. Some bruising left arm. OBJECTIVE          20 min Therapeutic Exercise:  [x] See flow sheet :   Rationale: increase ROM, increase strength, and improve coordination to improve the patients ability to perform ADL with more ease       19 min Neuromuscular Re-education:  [x]  See flow sheet :   Rationale: improve coordination, improve balance, and increase proprioception  to improve the patients ability to perform ADL    10 min Gait Training: standing/stepping with increased lateralization to left in parallel bars   Rationale: To improve ambulation safety and efficiency in order to improve patient's ability to safely ambulate at home for self care.              With   [] TE   [] TA   [] neuro   [] other: Patient Education: [x] Review HEP    [] Progressed/Changed HEP based on:   [] positioning   [] body mechanics   [] transfers   [] heat/ice application    [] other:      Other Objective/Functional Measures:   -significant difficulty to maintain improved left knee alignment in standing, Glut med weakness  -unable to perform left clam in S/L, performs gravity assisted clam in supine       Pain Level (0-10 scale) post treatment: 2    ASSESSMENT/Changes in Function: continues to be challenged with lateralization to left with standing and walking activities    Patient will continue to benefit from skilled PT services to address ROM deficits, address strength deficits, analyze and address soft tissue restrictions, analyze and cue movement patterns, assess and modify postural abnormalities, and address imbalance/dizziness to attain remaining goals. [x]  See Plan of Care  []  See progress note/recertification  []  See Discharge Summary         Progress towards goals / Updated goals:  Short Term Goals: To be accomplished in 4 weeks:  Patient will be independent and compliant with HEP to progress toward goals and restore functional mobility. Eval Status: issued at eval  Last PN:  compliance daily per pt report goal MET 8/19/22     Patient will improve FOTO score by 5 points to improve functional tolerance for exercise. Eval Status: FOTO 36   Current 6/23/22: 53 - MET  FOTO score = an established functional score where 100 = no disability     Patient will improve pain in bilateral hips to 5/10  to improve standing tolerance and restore prior level of function. Eval Status: 10/10 at worst  Last PN 8/10 max pain, 6/10 at best progressing 8/19/22     Pt will increase MMT of hip strength by 1/5 in order to return to goals of improved walking. Eval Status:  Hip Left (1-5) Right (1-5)   Hip Flexion 2- 3+   Hip ABD 2 3-   Hip ADD 3- 4-   Last PN slow progress 8/19/22  Hip Left (1-5) Right (1-5)   Hip Flexion 2+ 3+   Hip ABD 2 3   Hip ADD 3- 4            Pt will have 120 degrees of passive pain free hip flexion to aid in functional mechanics for ambulation/ADLs. Eval Status:   AROM                             PROM  Hip Left Right Left Right   Flexion 115 ! (propped) 105 ! 120 ! 110 ! ER 10 ! 20 ! 22 ! 20 ! IR 0 ! 40 ! 25 ! 40 ! Last PN  7/26/22: 145 deg functional hip Flexion in seated position when leaning fwd, MET     Long Term Goals:  To be accomplished in 6 weeks:  Patient will improve FOTO score by 11 points to improve functional tolerance for ADLs. Eval Status: FOTO 36              Current 6/23/22: 53 - MET  FOTO score = an established functional score where 100 = no disability     2. Pt will be able to transfer sit to supine and supine to sit with no assistance to aid in functional mechanics for bed mobility. Eval Status: Sit-Supine: Min A for left LE              Supine-Sit: min A for left LE management  Current: 6/29/22 pt independent for sit to supine, independent with supine to sit. - MET     3. Pt will ambulate 100 ft with SPC and no LOB in order to be a safe household ambulator. (Modified)  Eval Status: Pt able to ambulate 10 ft with SPC, decreased step height and length on left LE. Last PN  70ft and 88ft today with SPC with CGA to min A due to occassional LOB, cues needed to increase use of left LE by increasing right step length at times progressing 8/19/22     4. Patient will improve pain in low back to 0/10 at worst to improve ADL tolerance and restore prior level of function. Eval Status: 10/10 at worst             Last PN8/10 max pain progressing 8/19/22  Current: 6/10 pain entering clinic today progressing 9/6/22     New Goal:              Pt will decrease 5x STS time by 5 seconds in order to improve functional strength needed for transfers.               Last PN:  23.85 sec from parallel bars, decreased WB through left LE progressing 8/12/22  Current: 30 .41 sec from 18in, increased WB on right LE regression 9/6/22     Updated goal as of 7/26/22: Patient demonstrates ability to perform active left hip Ext /MMT >or= 3/5 indicating improved left hip/glut strength needed for better stability during left stance phase when walking  Last PN unable to lay in prone today, able to clear bottom with bridge with adduction progressing 8/19/22         PLAN  []  Upgrade activities as tolerated     [x]  Continue plan of care  []  Update interventions per flow sheet       []  Discharge due to:_  []  Other:_      Mylene Can, PT 9/19/2022  1:21 PM    Future Appointments   Date Time Provider Marty Jj   9/21/2022  7:30 AM [facility]   9/26/2022  7:30 AM Tasia Contreras, ELMO [place] [facility]   9/28/2022  7:30 AM ELMO Guerin THE [facility]

## 2022-09-21 ENCOUNTER — APPOINTMENT (OUTPATIENT)
Dept: PHYSICAL THERAPY | Age: 57
End: 2022-09-21
Payer: MEDICARE

## 2022-09-26 ENCOUNTER — HOSPITAL ENCOUNTER (OUTPATIENT)
Dept: PHYSICAL THERAPY | Age: 57
Discharge: HOME | End: 2022-09-26
Payer: MEDICARE

## 2022-09-26 PROCEDURE — 97116 GAIT TRAINING THERAPY: CPT

## 2022-09-26 PROCEDURE — 97110 THERAPEUTIC EXERCISES: CPT

## 2022-09-26 PROCEDURE — 97112 NEUROMUSCULAR REEDUCATION: CPT

## 2022-09-26 NOTE — PROGRESS NOTES
PT DAILY TREATMENT NOTE    Patient Name: Thania Joseph  Date:2022  : 1965  [x]  Patient  Verified  Payor: BLUE CROSS MEDICARE / Plan: Parvbethanie Thapa 8141 HMO / Product Type: Managed Care Medicare /    In time:730  Out time:818  Total Treatment Time (min): 48  Total Timed Codes (min): 48  1:1 Treatment Time (MC/BCBS only): 48   Visit #: 17 of 26    Treatment Dx: Right hip pain [M25.551]  Left hip pain [M25.552]    SUBJECTIVE  Pain Level (0-10 scale): 8 hip  Any medication changes, allergies to medications, adverse drug reactions, diagnosis change, or new procedure performed?: [x] No    [] Yes (see summary sheet for update)  Subjective functional status/changes:   [] No changes reported  Reports 2 falls last week. Called 911. X-rays negative. Contusion left knee. Since then left knee pain jack with standing/walking up to 8/10 and also left hip giving out. No knee pain at rest.    assisting with walking to bathroom where before patient was independent. OBJECTIVE            20 min Therapeutic Exercise:  [x] See flow sheet :   Rationale: increase ROM, increase strength, and improve coordination to improve the patients ability to resume PLOF with utmost independence         18 min Neuromuscular Re-education:  [x]  See flow sheet :focus on HS jack on left   Rationale: increase strength, improve coordination, improve balance, and increase proprioception  to improve the patients ability to perform ADL      10 min Gait Training:  _20 feet with quad cane on level surfaces with _CGA__ level of assistance   Rationale: To improve ambulation safety and efficiency in order to improve patient's ability to safely ambulate at home for self care.                With   [x] TE   [] TA   [] neuro   [] other: Patient Education: [x] Review HEP    [] Progressed/Changed HEP based on:   [] positioning   [] body mechanics   [] transfers   [] heat/ice application    [] other:      Other Objective/Functional Measures:   -no increase in left knee pain with AROM/LAQ  -increased left leg tone with attempted bridging, unable to PPT jack on left side due to tone  -difficulty isolating PPT without LS Ext  -TTP throughout anterior knee, no edema/bruising  -increased pain with LCL testing  Left knee, med/lat instability  -increased left knee pain with initial ambulation, marked imbalance, needs CGA to maintain balance, VC needed for proper mechanics with turns/sitting down in chair    Pain Level (0-10 scale) post treatment: hip 6, knee 4 post walking    ASSESSMENT/Changes in Function: patient presenting to PT with increased left knee pain s/p 2 falls over the last week with pain ranging up to 8/10, most pain with AROM left knee jack past 90 deg Flex, ligament testing indicating possible instability, discussed need to contact MD for further testing as pain has been unchanged since fall and patient reports further deterioration of balance, reports needing assist to go to bathroom,patient reports feeling exhausted post visit    Patient will continue to benefit from skilled PT services to address ROM deficits, address strength deficits, analyze and address soft tissue restrictions, and analyze and cue movement patterns to attain remaining goals. [x]  See Plan of Care  []  See progress note/recertification  []  See Discharge Summary         Progress towards goals / Updated goals:  Short Term Goals: To be accomplished in 4 weeks:  Patient will be independent and compliant with HEP to progress toward goals and restore functional mobility. Eval Status: issued at eval  Last PN:  compliance daily per pt report goal MET 8/19/22     Patient will improve FOTO score by 5 points to improve functional tolerance for exercise.   Eval Status: FOTO 36   Current 6/23/22: 53 - MET  FOTO score = an established functional score where 100 = no disability     Patient will improve pain in bilateral hips to 5/10  to improve standing tolerance and restore prior level of function. Eval Status: 10/10 at worst  Last PN 8/10 max pain, 6/10 at best progressing 8/19/22  Current status 9/26/22: increased left hip and knee pain since falls last week, pain up to 8/10     Pt will increase MMT of hip strength by 1/5 in order to return to goals of improved walking. Eval Status:  Hip Left (1-5) Right (1-5)   Hip Flexion 2- 3+   Hip ABD 2 3-   Hip ADD 3- 4-   Last PN slow progress 8/19/22  Hip Left (1-5) Right (1-5)   Hip Flexion 2+ 3+   Hip ABD 2 3   Hip ADD 3- 4            Pt will have 120 degrees of passive pain free hip flexion to aid in functional mechanics for ambulation/ADLs. Eval Status:   AROM                             PROM  Hip Left Right Left Right   Flexion 115 ! (propped) 105 ! 120 ! 110 ! ER 10 ! 20 ! 22 ! 20 ! IR 0 ! 40 ! 25 ! 40 ! Last PN  7/26/22: 145 deg functional hip Flexion in seated position when leaning fwd, MET     Long Term Goals: To be accomplished in 6 weeks:  Patient will improve FOTO score by 11 points to improve functional tolerance for ADLs. Eval Status: FOTO 36              Current 6/23/22: 53 - MET  FOTO score = an established functional score where 100 = no disability     2. Pt will be able to transfer sit to supine and supine to sit with no assistance to aid in functional mechanics for bed mobility. Eval Status: Sit-Supine: Min A for left LE              Supine-Sit: min A for left LE management  Current: 6/29/22 pt independent for sit to supine, independent with supine to sit. - MET     3. Pt will ambulate 100 ft with SPC and no LOB in order to be a safe household ambulator. (Modified)  Eval Status: Pt able to ambulate 10 ft with SPC, decreased step height and length on left LE.   Last PN  70ft and 88ft today with SPC with CGA to min A due to occassional LOB, cues needed to increase use of left LE by increasing right step length at times progressing 8/19/22  Current status 9/26/22: limited walking tolerance today s/p 2 falls, ambulated 20 ft x2 with quad cane and CGA     4. Patient will improve pain in low back to 0/10 at worst to improve ADL tolerance and restore prior level of function. Eval Status: 10/10 at worst             Last PN8/10 max pain progressing 8/19/22  Current: 6/10 pain entering clinic today progressing 9/6/22     New Goal:              5. Pt will decrease 5x STS time by 5 seconds in order to improve functional strength needed for transfers. Last PN:  23.85 sec from parallel bars, decreased WB through left LE progressing 8/12/22  Current: 30 .41 sec from 18in, increased WB on right LE regression 9/6/22     Updated goal as of 7/26/22:   6.  Patient demonstrates ability to perform active left hip Ext /MMT >or= 3/5 indicating improved left hip/glut strength needed for better stability during left stance phase when walking  Last PN unable to lay in prone today, able to clear bottom with bridge with adduction progressing 8/19/22          PLAN  []  Upgrade activities as tolerated     [x]  Continue plan of care  []  Update interventions per flow sheet       []  Discharge due to:_  []  Other:_      Sixto Carroll PT 9/26/2022  7:37 AM    Future Appointments   Date Time Provider Marty Jj   9/28/2022  7:30 AM Ori Peguero PT [facility]

## 2022-09-26 NOTE — PROGRESS NOTES
[facility] at the [address], [place], [address] [facility]  Phone: [phone]      Fax:  [phone]    Progress Note  Patient name: Mariam Tariq Start of Care: 2022   Referral source: Henrik Brooke MD : 1965               Medical Diagnosis: Right hip pain [M25.551]  Left hip pain [M25.552]    Onset Date: 22               Treatment Diagnosis: Bilateral hip pain, low back pain   Prior Hospitalization: see medical history Provider#: [id number]   Medications: Verified on Patient summary List    Comorbidities: Spina bifida, Stroke when she was 7, Depression, arthritis, Left RENATO in , revision    Prior Level of Function: Pt mainly wc bound but was able to walk  ft with SPC. Independent with WC. Visits from Start of Care: 15    Missed Visits: 0    Progress towards goals / Updated goals:  Short Term Goals: To be accomplished in 4 weeks:  Patient will be independent and compliant with HEP to progress toward goals and restore functional mobility. Eval Status: issued at eval  Last PN:  compliance daily per pt report goal MET 22     Patient will improve FOTO score by 5 points to improve functional tolerance for exercise. Eval Status: FOTO 36   Current 22: 53 - MET  FOTO score = an established functional score where 100 = no disability     Patient will improve pain in bilateral hips to 5/10  to improve standing tolerance and restore prior level of function. Eval Status: 10/10 at worst  Last PN 8/10 max pain, 6/10 at best progressing 22  Current 22: left hip pain ranging 4-8/10 depending on activity, progressing     Pt will increase MMT of hip strength by 1/5 in order to return to goals of improved walking.   Eval Status:  Hip Left (1-5) Right (1-5)   Hip Flexion 2- 3+   Hip ABD 2 3-   Hip ADD 3- 4-   Last PN slow progress 22  Hip Left (1-5) Right (1-5)   Hip Flexion 2+ 3+   Hip ABD 2 3   Hip ADD 3- 4    Current status 9/19/22: unchanged MMT        Pt will have 120 degrees of passive pain free hip flexion to aid in functional mechanics for ambulation/ADLs. Eval Status:   AROM                             PROM  Hip Left Right Left Right   Flexion 115 ! (propped) 105 ! 120 ! 110 ! ER 10 ! 20 ! 22 ! 20 ! IR 0 ! 40 ! 25 ! 40 ! Last PN  7/26/22: 145 deg functional hip Flexion in seated position when leaning fwd, MET     Long Term Goals: To be accomplished in 6 weeks:  Patient will improve FOTO score by 11 points to improve functional tolerance for ADLs. Eval Status: FOTO 36              Current 6/23/22: 53 - MET  FOTO score = an established functional score where 100 = no disability     2. Pt will be able to transfer sit to supine and supine to sit with no assistance to aid in functional mechanics for bed mobility. Eval Status: Sit-Supine: Min A for left LE              Supine-Sit: min A for left LE management  Current: 6/29/22 pt independent for sit to supine, independent with supine to sit. - MET     3. Pt will ambulate 100 ft with SPC and no LOB in order to be a safe household ambulator. (Modified)  Eval Status: Pt able to ambulate 10 ft with SPC, decreased step height and length on left LE. Last PN  70ft and 88ft today with SPC with CGA to min A due to occassional LOB, cues needed to increase use of left LE by increasing right step length at times progressing 8/19/22  Status on 9/19/22: focus on gait in parallel bars today to improve lateralization to left, step length and control, limited ambulation with QC, still requires CGA to min assist, progressing     4. Patient will improve pain in low back to 0/10 at worst to improve ADL tolerance and restore prior level of function.   Eval Status: 10/10 at worst             Last PN8/10 max pain progressing 8/19/22  Current: 6/10 pain entering clinic today progressing 9/6/22     New Goal:              Pt will decrease 5x STS time by 5 seconds in order to improve functional strength needed for transfers. Last PN:  23.85 sec from parallel bars, decreased WB through left LE progressing 8/12/22  Current: 30 .41 sec from 18in, increased WB on right LE regression 9/6/22     Updated goal as of 7/26/22: Patient demonstrates ability to perform active left hip Ext /MMT >or= 3/5 indicating improved left hip/glut strength needed for better stability during left stance phase when walking  Last PN unable to lay in prone today, able to clear bottom with bridge with adduction progressing 8/19/22       Key Functional Changes: Mrs. Claudine Cabrera has attended a total of 15 visits with focus on hip/LBP and gait imbalance. Medical history includes spina bifida and stroke. She is making progress in regards to transfers STS, gait tolerance and endurance however continues with fluctuating pain, lateralization to right and gait imbalance. Patient presented today s/p 2 falls last week resulting in ER visit due to left knee contusion. X-rays negative as per patient however patient reports unchanged pain left knee ranging up to 8/10 with most pain with AROM and WB activities. Patient demonstrates TTP left anterior knee, pain with LCL testing, med/lateral knee instability and limited Flexion to 90 deg due to pain. She also demonstrates increased difficulty with ambulation as well as balance likely due to pain. I recommend to continue with current treatment to address remaining and updated goals.    Updated Goals: to be achieved in 6 weeks:   Updated and remaining goals as above  ASSESSMENT/RECOMMENDATIONS:  [x]Continue therapy per initial plan/protocol at a frequency of  2 x per week for 6 weeks    Thank you for this referral.   Alexandria Frey, PT 9/26/2022 8:37 AM

## 2022-09-28 ENCOUNTER — HOSPITAL ENCOUNTER (OUTPATIENT)
Dept: PHYSICAL THERAPY | Age: 57
Discharge: HOME | End: 2022-09-28
Payer: MEDICARE

## 2022-09-28 PROCEDURE — 97112 NEUROMUSCULAR REEDUCATION: CPT

## 2022-09-28 PROCEDURE — 97110 THERAPEUTIC EXERCISES: CPT

## 2022-09-28 PROCEDURE — 97530 THERAPEUTIC ACTIVITIES: CPT

## 2022-09-28 NOTE — PROGRESS NOTES
PT DAILY TREATMENT NOTE    Patient Name: Mark Carrington  Date:2022  : 1965  [x]  Patient  Verified  Payor: BLUE CROSS MEDICARE / Plan: Katrina Thapa 8141 HMO / Product Type: Managed Care Medicare /    In time:7:29  Out time:8:16  Total Treatment Time (min): 47  Total Timed Codes (min): 47  1:1 Treatment Time (MC/BCBS only): 52   Visit #: 18 of 26    Treatment Dx: Right hip pain [M25.551]  Left hip pain [M25.552]    SUBJECTIVE  Pain Level (0-10 scale): 6/10  Any medication changes, allergies to medications, adverse drug reactions, diagnosis change, or new procedure performed?: [x] No    [] Yes (see summary sheet for update)  Subjective functional status/changes:   [] No changes reported  Pt reports that when she walks the hip pain increases to a 7/10. Reports that she has an appointment with the MD 10/12/22. Reports that she is seeing an associate of doctor, but wants Dr. Mike Rossi around just in case. Reports that the left leg gives way every time she wants to walk today.      OBJECTIVE        10 min Therapeutic Exercise:  [] See flow sheet :   Rationale: increase ROM, increase strength, improve coordination, improve balance, and increase proprioception to improve the patients ability to perform daily activities with decreased pain and symptom levels      15 min Therapeutic Activity:  []  See flow sheet :   Rationale: increase ROM, increase strength, improve coordination, improve balance, and increase proprioception  to improve the patients ability to perform daily activities with decreased pain and symptom levels       22 min Neuromuscular Re-education:  []  See flow sheet :   Rationale: increase ROM, increase strength, improve coordination, improve balance, and increase proprioception  to improve the patients ability to perform daily activities with decreased pain and symptom levels              With   [x] TE   [x] TA   [x] neuro   [] other: Patient Education: [x] Review HEP    [] Progressed/Changed HEP based on:   [x] positioning   [x] body mechanics   [x] transfers   [] heat/ice application    [] other:      Other Objective/Functional Measures: Pt enters gym in no apparent distress, wheel chair, braces on bilateral LE. Pain Level (0-10 scale) post treatment: 6/10    ASSESSMENT/Changes in Function: Patient tolerated therapy session fairly but with inc pain this therapy session. Pt to see MD in October. Pt with inc pain with sit to stand, but didn't tell therapist until end of reps. Encouraged pt to see MD sooner if pain continues. Attempted to perform right s/l hamstring curls and pt tried to compensate with the hip. Pt is progressing with therapy as indicated by pt tolerating increase in exercise repetitions and resistance. Although showing progress patient would benefit from continuation of skilled physical therapy to address the remaining limitations. Patient will continue to benefit from skilled PT services to modify and progress therapeutic interventions, address functional mobility deficits, address ROM deficits, address strength deficits, analyze and address soft tissue restrictions, analyze and cue movement patterns, analyze and modify body mechanics/ergonomics, assess and modify postural abnormalities, address imbalance/dizziness, and instruct in home and community integration to attain remaining goals. []  See Plan of Care  []  See progress note/recertification  []  See Discharge Summary         Progress towards goals / Updated goals:  Short Term Goals: To be accomplished in 4 weeks:  Patient will be independent and compliant with HEP to progress toward goals and restore functional mobility. Eval Status: issued at eval  Last PN:  compliance daily per pt report goal MET 8/19/22     Patient will improve FOTO score by 5 points to improve functional tolerance for exercise.   Eval Status: FOTO 36   Current 6/23/22: 53 - MET  FOTO score = an established functional score where 100 = no disability     Patient will improve pain in bilateral hips to 5/10  to improve standing tolerance and restore prior level of function. Eval Status: 10/10 at worst  Last PN 9/19/22: left hip pain ranging 4-8/10 depending on activity, progressing  Current:  9/28/22 rates pain 6/10 in the left hip     Pt will increase MMT of hip strength by 1/5 in order to return to goals of improved walking. Eval Status:  Hip Left (1-5) Right (1-5)   Hip Flexion 2- 3+   Hip ABD 2 3-   Hip ADD 3- 4-   Last PN 9/19/22: unchanged MMT   Current: 9/28/22 performed sidelying left hip flexion and hamstring curl with assistance     Pt will have 120 degrees of passive pain free hip flexion to aid in functional mechanics for ambulation/ADLs. Eval Status:   AROM                             PROM  Hip Left Right Left Right   Flexion 115 ! (propped) 105 ! 120 ! 110 ! ER 10 ! 20 ! 22 ! 20 ! IR 0 ! 40 ! 25 ! 40 ! Last PN  7/26/22: 145 deg functional hip Flexion in seated position when leaning fwd, MET     Long Term Goals: To be accomplished in 6 weeks:  Patient will improve FOTO score by 11 points to improve functional tolerance for ADLs. Eval Status: FOTO 36              Current 6/23/22: 53 - MET  FOTO score = an established functional score where 100 = no disability     2. Pt will be able to transfer sit to supine and supine to sit with no assistance to aid in functional mechanics for bed mobility. Eval Status: Sit-Supine: Min A for left LE              Supine-Sit: min A for left LE management  Current: 6/29/22 pt independent for sit to supine, independent with supine to sit. - MET     3. Pt will ambulate 100 ft with SPC and no LOB in order to be a safe household ambulator. (Modified)  Eval Status: Pt able to ambulate 10 ft with SPC, decreased step height and length on left LE.   Last PN  9/19/22: focus on gait in parallel bars today to improve lateralization to left, step length and control, limited ambulation with QC, still requires CGA to min assist, progressing  Current: 9/28/22 no ambulation this therapy session as pt reporting left leg giving way     4. Patient will improve pain in low back to 0/10 at worst to improve ADL tolerance and restore prior level of function. Eval Status: 10/10 at worst             Last PN 9/19/22 not assessed  Current: 9/28/22 rates pain 6/10     New Goal:              5. Pt will decrease 5x STS time by 5 seconds in order to improve functional strength needed for transfers. Last PN:9/19/22  performed on 9/6/22: 30 .41 sec from 18in, increased WB on right LE regression  Current: 9/28/22 pt requires assistance with performing STS and had pain in the left hip this therapy session     Updated goal as of 7/26/22:   6. Patient demonstrates ability to perform active left hip Ext /MMT >or= 3/5 indicating improved left hip/glut strength needed for better stability during left stance phase when walking  Last PN 9/19/22 not assessed  Current: 9/28/22 performed in right s/l, left hip flexion/extension      PLAN  []  Upgrade activities as tolerated     [x]  Continue plan of care  []  Update interventions per flow sheet       []  Discharge due to:_  []  Other:_      Lali Ribera, PT, DPT, [facility] 9/28/2022  7:30 AM    No future appointments.

## 2022-10-04 ENCOUNTER — HOSPITAL ENCOUNTER (OUTPATIENT)
Dept: PHYSICAL THERAPY | Age: 57
Discharge: HOME | End: 2022-10-04
Payer: MEDICARE

## 2022-10-04 PROCEDURE — 97530 THERAPEUTIC ACTIVITIES: CPT

## 2022-10-04 PROCEDURE — 97112 NEUROMUSCULAR REEDUCATION: CPT

## 2022-10-04 PROCEDURE — 97110 THERAPEUTIC EXERCISES: CPT

## 2022-10-04 NOTE — PROGRESS NOTES
PT DAILY TREATMENT NOTE    Patient Name: Vernon Cons  Date:10/4/2022  : 1965  [x]  Patient  Verified  Payor: BLUE CROSS MEDICARE / Plan: Katrina Thapa [id number] HMO / Product Type: Managed Care Medicare /    In time:7:33am   Out time:8:18  Total Treatment Time (min): 45  Total Timed Codes (min): 45  1:1 Treatment Time (MC/BCBS only): 39   Visit #: 19 of 26    Treatment Dx: Right hip pain [M25.551]  Left hip pain [M25.552]    SUBJECTIVE  Pain Level (0-10 scale): 7  Any medication changes, allergies to medications, adverse drug reactions, diagnosis change, or new procedure performed?: [x] No    [] Yes (see summary sheet for update)  Subjective functional status/changes:   [] No changes reported  The pt and her  reported that she had 2 falls since last treatment session. She required emergency services for both to get up  - \"call button\". Her  reported that one the first fall she hit her left knee, they went to the ER and imaging only showed a contusion. Second fall = hit head with a bruise to lateral left eye, denied LOC.     OBJECTIVE    19 min Therapeutic Exercise:  [x] See flow sheet :   Rationale: increase ROM, increase strength, and improve coordination to improve the patients ability to perform daily activities with decreased pain and symptom levels    12 min Therapeutic Activity:  [x]  See flow sheet : Brandi/SAB provided occasionally for sit to stand transfers, Brandi provided for transfer wheel chair <> table, Brandi provided for supine <> sit transfers; pt education of anatomy for 90-90 exercises   Rationale: increase ROM, increase strength, and improve coordination  to improve the patients ability to perform daily activities with decreased pain and symptom levels     14 min Neuromuscular Re-education:  [x]  See flow sheet : TC/Brandi provided for 90-90 alt kicks and SB HS curls with specific emphases to left LE to maintain proper body mechanics / stability; TC/VC for review of PPT   Rationale: increase ROM, increase strength, improve coordination, improve balance, and increase proprioception  to improve the patients ability to perform daily activities with decreased pain and symptom levels          With   [] TE   [] TA   [] neuro   [] other: Patient Education: [x] Review HEP    [] Progressed/Changed HEP based on:   [] positioning   [] body mechanics   [] transfers   [] heat/ice application    [] other:      Other Objective/Functional Measures: oculomotor testing post fall: H-test = minor over shooting, no pain or dizziness; saccades = normal   Observed bruising to lateral left eye, pain to palpation of left superior and lateral face, no pain with varied facial expressions   Pain to palpation of medial left knee, no observed bruising     Updated pain and 5x STS goal    Pain Level (0-10 scale) post treatment: 8    ASSESSMENT/Changes in Function: The pt reported to therapy with a pleasant attitude and ready to participate in therapeutic exercises. Patient tolerated treatment session fairly today. Patient was challenged with equal weight distribution of left and right LE during all exercises. Pt required assistance for therapeutic interventions - see above. Single right knee buckling during STS - Lety provided to regain stability. See subjective and objective above for recounts of recent falls. Patient continues to make slow progress toward goals and would benefit from continued skilled PT intervention to address remaining deficits outlined in goals below. Patient will continue to benefit from skilled PT services to modify and progress therapeutic interventions, address functional mobility deficits, address ROM deficits, address strength deficits, analyze and address soft tissue restrictions, analyze and cue movement patterns, analyze and modify body mechanics/ergonomics, assess and modify postural abnormalities, and address imbalance/dizziness to attain remaining goals.      [x]  See Plan of Care  []  See progress note/recertification  []  See Discharge Summary         Progress towards goals / Updated goals:  Short Term Goals: To be accomplished in 4 weeks:  Patient will be independent and compliant with HEP to progress toward goals and restore functional mobility. Eval Status: issued at eval  Last PN:  compliance daily per pt report goal MET 8/19/22     Patient will improve FOTO score by 5 points to improve functional tolerance for exercise. Eval Status: FOTO 36   Current 6/23/22: 53 - MET  FOTO score = an established functional score where 100 = no disability     Patient will improve pain in bilateral hips to 5/10  to improve standing tolerance and restore prior level of function. Eval Status: 10/10 at worst  Last PN 9/19/22: left hip pain ranging 4-8/10 depending on activity, progressing  Current 10/4/22: 8/10 = worst hip pain within the last week (falls within the last week ); average hip pain within the last week = 6/10  - slight regression           Pt will increase MMT of hip strength by 1/5 in order to return to goals of improved walking. Eval Status:  Hip Left (1-5) Right (1-5)   Hip Flexion 2- 3+   Hip ABD 2 3-   Hip ADD 3- 4-   Last PN 9/19/22: unchanged MMT   Status on: 9/28/22 performed sidelying left hip flexion and hamstring curl with assistance     Pt will have 120 degrees of passive pain free hip flexion to aid in functional mechanics for ambulation/ADLs. Eval Status:   AROM                             PROM  Hip Left Right Left Right   Flexion 115 ! (propped) 105 ! 120 ! 110 ! ER 10 ! 20 ! 22 ! 20 ! IR 0 ! 40 ! 25 ! 40 ! Last PN  7/26/22: 145 deg functional hip Flexion in seated position when leaning fwd, MET     Long Term Goals: To be accomplished in 6 weeks:  Patient will improve FOTO score by 11 points to improve functional tolerance for ADLs.   Eval Status: FOTO 36              Current 6/23/22: 53 - MET  FOTO score = an established functional score where 100 = no disability     2. Pt will be able to transfer sit to supine and supine to sit with no assistance to aid in functional mechanics for bed mobility. Eval Status: Sit-Supine: Min A for left LE              Supine-Sit: min A for left LE management  Current: 6/29/22 pt independent for sit to supine, independent with supine to sit. - MET     3. Pt will ambulate 100 ft with SPC and no LOB in order to be a safe household ambulator. (Modified)  Eval Status: Pt able to ambulate 10 ft with SPC, decreased step height and length on left LE. Last PN  9/19/22: focus on gait in parallel bars today to improve lateralization to left, step length and control, limited ambulation with QC, still requires CGA to min assist, progressing  Current: 9/28/22 no ambulation this therapy session as pt reporting left leg giving way     4. Patient will improve pain in low back to 0/10 at worst to improve ADL tolerance and restore prior level of function. Eval Status: 10/10 at worst             Last PN 9/19/22 not assessed  Current 10/4/22: 8/10 = worst hip pain within the last week (falls within the last week ); average hip pain within the last week = 6/10  - slight regression       New Goal:              5. Pt will decrease 5x STS time by 5 seconds in order to improve functional strength needed for 5x. Last PN:9/19/22  performed on 9/6/22: 30 .41 sec from 18in, increased WB on right LE regression  Current 10/3/22: 5x STS from a surface 18 in from the floor within 23.5 sec ; requires right UE support to stand, right lateral leaning, poor weight bearing on left LE, TC/VC for set up, use of quad cane to steady herself post stand; SBA provided by PT - progression        Updated goal as of 7/26/22:   6.  Patient demonstrates ability to perform active left hip Ext /MMT >or= 3/5 indicating improved left hip/glut strength needed for better stability during left stance phase when walking  Last PN 9/19/22 not assessed  Current: 9/28/22 performed in right s/l, left hip flexion/extension         PLAN  []  Upgrade activities as tolerated     [x]  Continue plan of care  []  Update interventions per flow sheet       []  Discharge due to:_  []  Other:_      Elizabeth Rea, PT 10/4/2022  7:46 AM    Future Appointments   Date Time Provider Marty Jj   10/13/2022  8:15 AM Irl Fulton, PT [facility]   10/20/2022  8:15 AM Irl Fulton, PT [facility]   10/27/2022  8:15 AM Irl Fulton, PT [facility]   11/3/2022  8:15 AM Irl Fulton, PT [facility]   11/7/2022  7:30 AM Margarita Barnett, PT [facility]   11/9/2022  7:30 AM Joaquin Gosselin, PT, DPT [facility]   11/14/2022  7:30 AM Margarita Barnett, PT [facility]   11/16/2022  7:30 AM Irl Fulton, PT [facility]   11/21/2022  7:30 AM Margarita Barnett, PT [facility]   11/23/2022  7:30 AM Joaquin Gosselin, PT, DPT [facility]   11/28/2022  7:30 AM Margarita Barnett, PT [facility]   11/30/2022  7:30 AM Irl Fulton, PT [facility]

## 2022-10-07 ENCOUNTER — HOSPITAL ENCOUNTER (OUTPATIENT)
Dept: PHYSICAL THERAPY | Age: 57
Discharge: HOME | End: 2022-10-07
Payer: MEDICARE

## 2022-10-07 PROCEDURE — 97530 THERAPEUTIC ACTIVITIES: CPT

## 2022-10-07 PROCEDURE — 97112 NEUROMUSCULAR REEDUCATION: CPT

## 2022-10-07 PROCEDURE — 97110 THERAPEUTIC EXERCISES: CPT

## 2022-10-07 NOTE — PROGRESS NOTES
PT DAILY TREATMENT NOTE    Patient Name: Mansi Willard  Date:10/7/2022  : 1965  [x]  Patient  Verified  Payor: Sadaf Jimenez / Plan: Katrina Thapa [id number] HMO / Product Type: Managed Care Medicare /    In time:8:21 (8:27)  Out time:9:05  Total Treatment Time (min): 44  Total Timed Codes (min): 38  1:1 Treatment Time (MC/BCBS only): 38   Visit #: 20 of 26    Treatment Dx: Right hip pain [M25.551]  Left hip pain [M25.552]    SUBJECTIVE  Pain Level (0-10 scale): 6/10  Any medication changes, allergies to medications, adverse drug reactions, diagnosis change, or new procedure performed?: [x] No    [] Yes (see summary sheet for update)  Subjective functional status/changes:   [] No changes reported  Reports that she has had two falls. Reports that she feels that it is not paying attention. Reports that the last one she was standing in the kitchen doing the dishes and her chair was too close to her and she fell to the side. Reports that she hit her foot and fell to the side. Reports that she is still doing the HEP at home. Reports that she is going to ortho next Wednesday.      OBJECTIVE      8 min Therapeutic Exercise:  [] See flow sheet :   Rationale: increase ROM, increase strength, improve coordination, improve balance, and increase proprioception to improve the patients ability to perform daily activities with decreased pain and symptom levels      15 min Therapeutic Activity:  []  See flow sheet :   Rationale: increase ROM, increase strength, improve coordination, improve balance, and increase proprioception  to improve the patients ability to perform daily activities with decreased pain and symptom levels       15 min Neuromuscular Re-education:  []  See flow sheet :   Rationale: increase ROM, increase strength, improve coordination, improve balance, and increase proprioception  to improve the patients ability to perform daily activities with decreased pain and symptom levels    With [x] TE   [x] TA   [x] neuro   [] other: Patient Education: [x] Review HEP    [] Progressed/Changed HEP based on:   [x] positioning   [x] body mechanics   [x] transfers   [] heat/ice application    [] other:      Other Objective/Functional Measures: Pt enters gym in no apparent distress. Pt requires Min A from w/c to table with stand pivot transfer  Performed sit to stand transfer from table to therapist with min Ax1, performed weight shifting with Min assistance, pain in the left hip  Standing balance 2x15\" with UE support, pain in the left hip  Performed stand pivot transfer from table to w/c with Quad cane with min to mod assistance    Pain Level (0-10 scale) post treatment: 6    ASSESSMENT/Changes in Function: Patient tolerated therapy session fairly but continues to have inc pain in the left hip, having pain with standing balance exercise. Pt requiring UE support to perform task. Pt only able able to tolerate 2x15 seconds. Pt requires significant assistance with performing PPT in the 90-90 position as well as performing alternating kicks. Pt is progressing with therapy as indicated by pt tolerating increase in exercise repetitions and resistance. Although showing progress patient would benefit from continuation of skilled physical therapy to address the remaining limitations. Patient will continue to benefit from skilled PT services to modify and progress therapeutic interventions, address functional mobility deficits, address ROM deficits, address strength deficits, analyze and address soft tissue restrictions, analyze and cue movement patterns, analyze and modify body mechanics/ergonomics, assess and modify postural abnormalities, address imbalance/dizziness, and instruct in home and community integration to attain remaining goals. [x]  See Plan of Care  []  See progress note/recertification  []  See Discharge Summary         Progress towards goals / Updated goals:  Short Term Goals:  To be accomplished in 4 weeks:  Patient will be independent and compliant with HEP to progress toward goals and restore functional mobility. Eval Status: issued at eval  Last PN:  compliance daily per pt report goal MET 8/19/22     Patient will improve FOTO score by 5 points to improve functional tolerance for exercise. Eval Status: FOTO 36   Current 6/23/22: 53 - MET  FOTO score = an established functional score where 100 = no disability     Patient will improve pain in bilateral hips to 5/10  to improve standing tolerance and restore prior level of function. Eval Status: 10/10 at worst  Last PN 9/19/22: left hip pain ranging 4-8/10 depending on activity, progressing  Current 10/74/22: rates pain 6/10, left hip           Pt will increase MMT of hip strength by 1/5 in order to return to goals of improved walking. Eval Status:  Hip Left (1-5) Right (1-5)   Hip Flexion 2- 3+   Hip ABD 2 3-   Hip ADD 3- 4-   Last PN 9/19/22: unchanged MMT   10/7/22 pt reporting able to curl left heel under for the first time with performing LAQ with reach     Pt will have 120 degrees of passive pain free hip flexion to aid in functional mechanics for ambulation/ADLs. Eval Status:   AROM                             PROM  Hip Left Right Left Right   Flexion 115 ! (propped) 105 ! 120 ! 110 ! ER 10 ! 20 ! 22 ! 20 ! IR 0 ! 40 ! 25 ! 40 ! Last PN  7/26/22: 145 deg functional hip Flexion in seated position when leaning fwd, MET     Long Term Goals: To be accomplished in 6 weeks:  Patient will improve FOTO score by 11 points to improve functional tolerance for ADLs. Eval Status: FOTO 36              Current 6/23/22: 53 - MET  FOTO score = an established functional score where 100 = no disability     2. Pt will be able to transfer sit to supine and supine to sit with no assistance to aid in functional mechanics for bed mobility.   Eval Status: Sit-Supine: Min A for left LE              Supine-Sit: min A for left LE management  Current: 6/29/22 pt independent for sit to supine, independent with supine to sit. - MET     3. Pt will ambulate 100 ft with SPC and no LOB in order to be a safe household ambulator. (Modified)  Eval Status: Pt able to ambulate 10 ft with SPC, decreased step height and length on left LE. Last PN  9/19/22: focus on gait in parallel bars today to improve lateralization to left, step length and control, limited ambulation with QC, still requires CGA to min assist, progressing  Current: 10/7/22 no ambulation this therapy session as pt reporting inc left leg pain, performed stand pivot transfer with quad cane, requiring min to mod A     4. Patient will improve pain in low back to 0/10 at worst to improve ADL tolerance and restore prior level of function. Eval Status: 10/10 at worst             Last PN 9/19/22 not assessed  Current 10/74/22: rates pain 6/10, left hip     New Goal:              5. Pt will decrease 5x STS time by 5 seconds in order to improve functional strength needed for 5x. Last PN:9/19/22  performed on 9/6/22: 30 .41 sec from 18in, increased WB on right LE regression  Current: 10/7/22 Performed on 10/3/22: 5x STS from a surface 18 in from the floor within 23.5 sec ; requires right UE support to stand, right lateral leaning, poor weight bearing on left LE, TC/VC for set up, use of quad cane to steady herself post stand; SBA provided by PT - progression         Updated goal as of 7/26/22:   6.  Patient demonstrates ability to perform active left hip Ext /MMT >or= 3/5 indicating improved left hip/glut strength needed for better stability during left stance phase when walking  Last PN 9/19/22 not assessed  Current: 10/7/22 not formally assessed      PLAN  []  Upgrade activities as tolerated     [x]  Continue plan of care  []  Update interventions per flow sheet       []  Discharge due to:_  []  Other:_      Terre Sandhoff, PT, DPT, [facility] 10/7/2022  7:24 AM    Future Appointments   Date Time Provider Marty Jj   10/7/2022  8:15 AM Nicholette Gunnels, PT [facility]   10/11/2022  7:30 AM Karol Ca, PT [facility]   10/13/2022  8:15 AM Nicholette Gunnels, PT [facility]   10/18/2022  7:30 AM Karol Ac, PT [facility]   10/20/2022  8:15 AM Nicholette Gunnels, PT [facility]   10/25/2022  7:30 AM Karol Ca, PT [facility]   10/27/2022  8:15 AM Nicholette Gunnels, PT [facility]   11/1/2022  7:30 AM Karol Ca, PT [facility]   11/3/2022  8:15 AM Nicholette Gunnels, PT [facility]   11/7/2022  7:30 AM Sydnee Ruse, PT [facility]   11/9/2022  7:30 AM Katy Baxter, PT, DPT [facility]   11/14/2022  7:30 AM Sydnee Ruse, PT [facility]   11/16/2022  7:30 AM Nicholette Gunnels, PT [facility]   11/21/2022  7:30 AM Sydnee Ruse, PT [facility]   11/23/2022  7:30 AM Katy Baxter, PT, DPT [facility]   11/28/2022  7:30 AM Sydnee Ruse, PT [facility]   11/30/2022  7:30 AM Nicholette Gunnels, PT [facility]

## 2022-10-11 ENCOUNTER — HOSPITAL ENCOUNTER (OUTPATIENT)
Dept: PHYSICAL THERAPY | Age: 57
Discharge: HOME | End: 2022-10-11
Payer: MEDICARE

## 2022-10-11 PROCEDURE — 97530 THERAPEUTIC ACTIVITIES: CPT

## 2022-10-11 PROCEDURE — 97112 NEUROMUSCULAR REEDUCATION: CPT

## 2022-10-11 PROCEDURE — 97110 THERAPEUTIC EXERCISES: CPT

## 2022-10-11 NOTE — PROGRESS NOTES
PT DAILY TREATMENT NOTE    Patient Name: Charmayne Shall  Date:10/11/2022  : 1965  [x]  Patient  Verified  Payor: BL[employer]IC[employer] / Plan: Katrina Thapa [id number] HMO / Product Type: Managed Care Medicare /    In time:7:31am  Out time:8:18am  Total Treatment Time (min): 47  Total Timed Codes (min): 47  1:1 Treatment Time (MC/BCBS only): 52   Visit #: 21 of 26    Treatment Dx: Right hip pain [M25.551]  Left hip pain [M25.552]    SUBJECTIVE  Pain Level (0-10 scale): 6  Any medication changes, allergies to medications, adverse drug reactions, diagnosis change, or new procedure performed?: [x] No    [] Yes (see summary sheet for update)  Subjective functional status/changes:   [] No changes reported  The pt reported feeling very tired today     OBJECTIVE    18 min Therapeutic Exercise:  [x] See flow sheet :   Rationale: increase ROM, increase strength, and improve coordination to improve the patients ability to perform daily activities with decreased pain and symptom levels    12 min Therapeutic Activity:  [x]  See flow sheet : updated goals for PN; pt education of proper body mechanics for STS transfers    Rationale: increase ROM, increase strength, and improve coordination  to improve the patients ability to perform daily activities with decreased pain and symptom levels     17 min Neuromuscular Re-education:  [x]  See flow sheet : Brandi to Mod-A to assist the pt with proper body mechanics of left LE during HANNAH 90-90 exercises and HS curls - tapping of left HS to improve activation. The pt required SBA for STS transfers.  Brandi for supine to sit transfer for left LE    Rationale: increase ROM, increase strength, improve coordination, improve balance, and increase proprioception  to improve the patients ability to perform daily activities with decreased pain and symptom levels          With   [] TE   [] TA   [] neuro   [] other: Patient Education: [x] Review HEP    [] Progressed/Changed HEP based on:   [] positioning   [] body mechanics   [] transfers   [] heat/ice application    [] other:      Other Objective/Functional Measures: updated goals for Re-Cert      Pain Level (0-10 scale) post treatment: 8    ASSESSMENT/Changes in Function: 65 yo female who presents to [facility] with c/o bilateral hip pain and low back pain. Past medical history is significant for spina bifida and left sided stroke. The pt has attended 21 treatment session since the IE. The pt has had multiple stumbles and 3 reported falls within the last week. She required emergency services for 2 of them to get up  - \"call button\". Her  reported that one the first fall she hit her left knee, they went to the ER and imaging only showed a contusion. Second fall she hit head with a bruise to lateral left eye, denied LOC. Pt had bruising on forehead but no adverse results of occular motor assessment on 10/4/22. Per updated goals the pt has made minimal progress in therapy since last PN. She performed stand pivot transfer with quad cane requiring min to mod A and required Brandi for supine to sit transfers due to lumbo-pelvic and functional left LE weakness. Pt is challenged with putting equal weight distribution on her left LE. Pt continues with fluctuating hip pain, lateralization to right and gait imbalance. Patient will continue to benefit from skilled PT services to modify and progress therapeutic interventions, address functional mobility deficits, address ROM deficits, address strength deficits, analyze and address soft tissue restrictions, analyze and cue movement patterns, analyze and modify body mechanics/ergonomics, assess and modify postural abnormalities, and address imbalance/dizziness to attain remaining goals. [x]  See Plan of Care  [x]  See progress note/recertification  []  See Discharge Summary         Progress towards goals / Updated goals:  Short Term Goals:  To be accomplished in 4 weeks:  Patient will be independent and compliant with HEP to progress toward goals and restore functional mobility. Eval Status: issued at eval  Last PN:  compliance daily per pt report goal MET 8/19/22     Patient will improve FOTO score by 5 points to improve functional tolerance for exercise. Eval Status: FOTO 36   Status on 6/23/22: 53 - MET  FOTO score = an established functional score where 100 = no disability     Patient will improve pain in bilateral hips to 5/10  to improve standing tolerance and restore prior level of function. Eval Status: 10/10 at worst  Last PN 9/19/22: left hip pain ranging 4-8/10 depending on activity, progressing  Current 10/11/22: worst pain within the last week in hip = 8/10 (post fall) - slowly progressing            Pt will increase MMT of hip strength by 1/5 in order to return to goals of improved walking. Eval Status:  Hip Left (1-5) Right (1-5)   Hip Flexion 2- 3+   Hip ABD 2 3-   Hip ADD 3- 4-   Last PN 9/19/22: unchanged MMT     Current 10/11/22:  no change from last assessment- assessment done in supine; pt complete Hip ADB/ADD AAROM in supine with greater difficulty with left hip ABD      Pt will have 120 degrees of passive pain free hip flexion to aid in functional mechanics for ambulation/ADLs. Eval Status:   AROM                             PROM  Hip Left Right Left Right   Flexion 115 ! (propped) 105 ! 120 ! 110 ! ER 10 ! 20 ! 22 ! 20 ! IR 0 ! 40 ! 25 ! 40 ! Last PN  7/26/22: 145 deg functional hip Flexion in seated position when leaning fwd, MET     Long Term Goals: To be accomplished in 6 weeks:  Patient will improve FOTO score by 11 points to improve functional tolerance for ADLs. Eval Status: FOTO 36              Current 6/23/22: 53 - MET  FOTO score = an established functional score where 100 = no disability     2. Pt will be able to transfer sit to supine and supine to sit with no assistance to aid in functional mechanics for bed mobility.   Eval Status: Sit-Supine: Min A for left LE              Supine-Sit: min A for left LE management  Current: 6/29/22 pt independent for sit to supine, independent with supine to sit. - MET     3. Pt will ambulate 100 ft with SPC and no LOB in order to be a safe household ambulator. (Modified)  Eval Status: Pt able to ambulate 10 ft with SPC, decreased step height and length on left LE. Last PN  9/19/22: focus on gait in parallel bars today to improve lateralization to left, step length and control, limited ambulation with QC, still requires CGA to min assist, progressing  Status on 10/7/22: no ambulation this therapy session as pt reporting inc left leg pain, performed stand pivot transfer with quad cane, requiring min to mod A     4. Patient will improve pain in low back to 0/10 at worst to improve ADL tolerance and restore prior level of function. Eval Status: 10/10 at worst             Last PN 9/19/22 not assessed  Current 10/74/22: rates pain 6/10, left hip     New Goal:              5. Pt will decrease 5x STS time by 5 seconds in order to improve functional strength needed for 5x. Last PN:9/19/22  performed on 9/6/22: 30 .41 sec from 18in, increased WB on right LE regression  Current: 10/7/22 Performed on 10/3/22: 5x STS from a surface 18 in from the floor within 23.5 sec ; requires right UE support to stand, right lateral leaning, poor weight bearing on left LE, TC/VC for set up, use of quad cane to steady herself post stand; SBA provided by PT - progression   Current 10/11/22: no change since last assessed; pt was challenged putting equal weight on bilateral LE and occasionally needed to reposition her left LE to improve balance         Updated goal as of 7/26/22:   6.  Patient demonstrates ability to perform active left hip Ext /MMT >or= 3/5 indicating improved left hip/glut strength needed for better stability during left stance phase when walking  Last PN 9/19/22 not assessed  Current: 10/7/22 not formally assessed  Current 10/11/22: bridge 2x5 with left hip drop/lag and PT providing foot/table joint approximation to improve stability          PLAN  [x]  Upgrade activities as tolerated     [x]  Continue plan of care  []  Update interventions per flow sheet       []  Discharge due to:_  []  Other:_      Jorge L Heck, PT 10/11/2022  7:48 AM    Future Appointments   Date Time Provider Marty Jj   10/13/2022  8:15 AM Josehayden Loweryane, PT [facility]   10/18/2022  7:30 AM Betty Hendersoney, PT [facility]   10/20/2022  8:15 AM Jose [facility]ane[facility]   10/25/2022  7:30 AM Hernández Reynoso, PT [facility]   10/27/2022  8:15 AM Jose Montane, PT [facility] [place]   11/3/2022  8:15 AM Jose Montane, PT [facility]   11/4/2022  8:15 AM Jose Montane, PT [facility]   11/7/2022  7:30 AM Rogene Polo, PT [facility]   11/9/2022  7:30 AM Jun Coffman, PT, DPT [facility]   11/14/2022  7:30 AM Rogene Polo, PT [facility]   11/16/2022  7:30 AM Jose Montane, PT [facility]   11/21/2022  7:30 AM Rogene Polo, PT [facility]   11/23/2022  7:30 AM Jun Coffman, PT, DPT [facility]   11/28/2022  7:30 AM Rogene Polo, PT [facility]   11/30/2022  7:30 AM Jose Montane, PT [facility]

## 2022-10-11 NOTE — PROGRESS NOTES
[facility] at the [address], [place], [address] [facility]  Phone: [phone]      Fax:  [phone]    Continued Plan of Care/ Re-certification for Physical Therapy Services    Patient name: Vernon Miller Start of Care: 2022   Referral source: Lucas Grey MD : 1965   Medical/Treatment Diagnosis: Right hip pain [M25.551]  Left hip pain [M25.552] Onset Date:22      Prior Hospitalization: see medical history Provider#: [id number]   Medications: Verified on Patient Summary List    Comorbidities: Spina bifida, Stroke when she was 7, Depression, arthritis, Left RENATO in , revision    Prior Level of Function: Pt mainly wc bound but was able to walk  ft with SPC. Independent with WC. Visits from Start of Care: 21    Missed Visits: 0    Reporting Period: 22 to 10/11/22    The Plan of Care and following information is based on the patient's current status:    Key functional changes: walking tolerance and slight improve in functional LE strength       Problems/ barriers to goal attainment: decreased bilateral hip strength, decreased WB through left LE with transfers and gait, CGA to min A needed for balance with gait with quad, increased time for age group for 5x STS     Problem List: pain affecting function, decrease ROM, decrease strength, edema affecting function, impaired gait/ balance, decrease ADL/ functional abilitiies, decrease activity tolerance, decrease flexibility/ joint mobility, and decrease transfer abilities    Treatment Plan: Therapeutic exercise, Neuromuscular reeducation, Manual therapy, Therapeutic activity, Self care/home management, and Gait training     Goals for this certification period to be accomplished in 5 weeks:  Short Term Goals: To be accomplished in 4 weeks:  Patient will be independent and compliant with HEP to progress toward goals and restore functional mobility.   Eval Status: issued at [facility]  Last PN:  compliance daily per pt report goal MET 8/19/22     Patient will improve FOTO score by 5 points to improve functional tolerance for exercise. Eval Status: FOTO 36   Status on 6/23/22: 53 - MET  FOTO score = an established functional score where 100 = no disability     Patient will improve pain in bilateral hips to 5/10  to improve standing tolerance and restore prior level of function. Eval Status: 10/10 at worst  Last PN 9/19/22: left hip pain ranging 4-8/10 depending on activity, progressing  Current 10/11/22: worst pain within the last week in hip = 8/10 (post fall) - slowly progressing            Pt will increase MMT of hip strength by 1/5 in order to return to goals of improved walking. Eval Status:  Hip Left (1-5) Right (1-5)   Hip Flexion 2- 3+   Hip ABD 2 3-   Hip ADD 3- 4-   Last PN 9/19/22: unchanged MMT      Current 10/11/22:  no change from last assessment- assessment done in supine; pt complete Hip ADB/ADD AAROM in supine with greater difficulty with left hip ABD      Pt will have 120 degrees of passive pain free hip flexion to aid in functional mechanics for ambulation/ADLs. Eval Status:   AROM                             PROM  Hip Left Right Left Right   Flexion 115 ! (propped) 105 ! 120 ! 110 ! ER 10 ! 20 ! 22 ! 20 ! IR 0 ! 40 ! 25 ! 40 ! Last PN  7/26/22: 145 deg functional hip Flexion in seated position when leaning fwd, MET     Long Term Goals: To be accomplished in 6 weeks:  Patient will improve FOTO score by 11 points to improve functional tolerance for ADLs. Eval Status: FOTO 36              Current 6/23/22: 53 - MET  FOTO score = an established functional score where 100 = no disability     2. Pt will be able to transfer sit to supine and supine to sit with no assistance to aid in functional mechanics for bed mobility.   Eval Status: Sit-Supine: Min A for left LE              Supine-Sit: min A for left LE management  Current: 6/29/22 pt independent for sit to supine, independent with supine to sit. - MET     3. Pt will ambulate 100 ft with SPC and no LOB in order to be a safe household ambulator. (Modified)  Eval Status: Pt able to ambulate 10 ft with SPC, decreased step height and length on left LE. Last PN  9/19/22: focus on gait in parallel bars today to improve lateralization to left, step length and control, limited ambulation with QC, still requires CGA to min assist, progressing  Status on 10/7/22: no ambulation this therapy session as pt reporting inc left leg pain, performed stand pivot transfer with quad cane, requiring min to mod A     4. Patient will improve pain in low back to 0/10 at worst to improve ADL tolerance and restore prior level of function. Eval Status: 10/10 at worst             Last PN 9/19/22 not assessed  Current 10/4/22: rates pain 6/10, left hip     New Goal:              5. Pt will decrease 5x STS time by 5 seconds in order to improve functional strength needed for 5x. Last PN:9/19/22  performed on 9/6/22: 30 .41 sec from 18in, increased WB on right LE regression  Current: 10/7/22 Performed on 10/3/22: 5x STS from a surface 18 in from the floor within 23.5 sec ; requires right UE support to stand, right lateral leaning, poor weight bearing on left LE, TC/VC for set up, use of quad cane to steady herself post stand; SBA provided by PT - progression   Current 10/11/22: no change since last assessed; pt was challenged putting equal weight on bilateral LE and occasionally needed to reposition her left LE to improve balance         Updated goal as of 7/26/22:   6.  Patient demonstrates ability to perform active left hip Ext /MMT >or= 3/5 indicating improved left hip/glut strength needed for better stability during left stance phase when walking  Last PN 9/19/22 not assessed  Current: 10/7/22 not formally assessed  Current 10/11/22: bridge 2x5 with left hip drop/lag and PT providing foot/table joint approximation to improve stability Frequency / Duration: Patient to be seen 2 times per week for 5 weeks:    Assessment / Abhay Velasquez yo female who presents to [facility] with c/o bilateral hip pain and low back pain. Past medical history is significant for spina bifida and left sided stroke. The pt has attended 21 treatment session since the IE. The pt has had multiple stumbles and 3 reported falls within the last week. She required emergency services for 2 of them to get up  - \"call button\". Her  reported that one the first fall she hit her left knee, they went to the ER and imaging only showed a contusion. Second fall she hit head with a bruise to lateral left eye, denied LOC. Pt had bruising on forehead but no adverse results of occular motor assessment on 10/4/22. Per updated goals the pt has made minimal progress in therapy since last PN. She performed stand pivot transfer with quad cane requiring min to mod A and required Brandi for supine to sit transfers due to lumbo-pelvic and functional left LE weakness. Pt is challenged with putting equal weight distribution on her left LE. Pt continues with fluctuating hip pain, lateralization to right and gait imbalance. Patient will continue to benefit from skilled PT services to modify and progress therapeutic interventions, address functional mobility deficits, address ROM deficits, address strength deficits, analyze and address soft tissue restrictions, analyze and cue movement patterns, analyze and modify body mechanics/ergonomics, assess and modify postural abnormalities, and address imbalance/dizziness to attain remaining goals. New Certification Period: October 12, 2022 to November 16, 2022      Enrico Tovar, PT 10/11/2022 8:33 AM    ________________________________________________________________________  I certify that the above Therapy Services are being furnished while the patient is under my care.  I agree with the treatment plan and certify that this therapy is necessary. [] I have read the above and request that my patient continue as recommended.   [] I have read the above report and request that my patient continue therapy with the following changes/special instructions: _______________________________________  [] I have read the above report and request that my patient be discharged from therapy    Physician's Signature:____________________ Date:_________ TIME:________                                      Victoriano Holland MD      ** Signature, Date and Time must be completed for valid certification **    Please sign and return to [facility] at the [address] [place], [address] [facility]  Phone: [phone]      Fax:  [phone]

## 2022-10-13 ENCOUNTER — HOSPITAL ENCOUNTER (OUTPATIENT)
Dept: PHYSICAL THERAPY | Age: 57
Discharge: HOME | End: 2022-10-13
Payer: MEDICARE

## 2022-10-13 PROCEDURE — 97116 GAIT TRAINING THERAPY: CPT

## 2022-10-13 PROCEDURE — 97530 THERAPEUTIC ACTIVITIES: CPT

## 2022-10-13 PROCEDURE — 97112 NEUROMUSCULAR REEDUCATION: CPT

## 2022-10-13 NOTE — PROGRESS NOTES
PT DAILY TREATMENT NOTE    Patient Name: Dillon Mcclellan  Date:10/13/2022  : 1965  [x]  Patient  Verified  Payor: BLUE CROSS MEDICARE / Plan: [employer]vb[employer] Elier [id number] HMO / Product Type: Managed Care Medicare /    In time:8:18  Out time:9:03  Total Treatment Time (min): 45  Total Timed Codes (min): 45  1:1 Treatment Time (MC/BCBS only): 39   Visit #: 22 of 31    Treatment Dx: Right hip pain [M25.551]  Left hip pain [M25.552]    SUBJECTIVE  Pain Level (0-10 scale): 7/10  Any medication changes, allergies to medications, adverse drug reactions, diagnosis change, or new procedure performed?: [x] No    [] Yes (see summary sheet for update)  Subjective functional status/changes:   [] No changes reported  Reports that she went the MD who gave Cortizone injection and Dr. Scar Hewitt looked at X-ray and they didn't see any fractures, but they still want to perform or CAT scan, not scheduled yet. Reports that the pain comes off and on. OBJECTIVE      15 min Therapeutic Activity:  []  See flow sheet :   Rationale: increase ROM, increase strength, improve coordination, improve balance, and increase proprioception  to improve the patients ability to perform daily activities with decreased pain and symptom levels       15 min Neuromuscular Re-education:  []  See flow sheet :   Rationale: increase ROM, increase strength, improve coordination, improve balance, and increase proprioception  to improve the patients ability to perform daily activities with decreased pain and symptom levels        15 min Gait Training: Performed pre gait balance and weight shifting activities   Rationale: To improve ambulation safety and efficiency in order to improve patient's ability to safely ambulate at home for self care.              With   [x] TE   [x] TA   [x] neuro   [] other: Patient Education: [x] Review HEP    [] Progressed/Changed HEP based on:   [x] positioning   [x] body mechanics   [x] transfers   [] heat/ice application [x] other:  reaching back for chair with stand to sit, getting new cushion for w/c     Other Objective/Functional Measures: Pt enters gym in no apparent distress, in w/c   Pt ambulated in the parallel bars with min Ax1, therapist blocking left LE, right UE holding onto bar, forwards and backwards x2  Pt performed sit to stand from w/c to parallel bars with SBAx1   Performed stand pivot transfer with quad cane w/c to table with min A x1  Performed stand pivot transfer with quad cane table to w/c with Mod Ax1    Pain Level (0-10 scale) post treatment: 7    ASSESSMENT/Changes in Function: Patient tolerated therapy session well as there were no adverse reactions today. Pt was able to stand without UE support this therapy session and stated the hip even felt better. Pt continues to have a hard time with advancing the left LE. Pt was able to ambulate in the parallel bars this therapy session and reported feeling pretty good. Pt requiring up to Mod ax1 with stand pivot transfers with quad cane. Pt is progressing with therapy as indicated by pt tolerating increase in exercise repetitions and resistance. Although showing progress patient would benefit from continuation of skilled physical therapy to address the remaining limitations. Patient will continue to benefit from skilled PT services to modify and progress therapeutic interventions, address functional mobility deficits, address ROM deficits, address strength deficits, analyze and address soft tissue restrictions, analyze and cue movement patterns, analyze and modify body mechanics/ergonomics, assess and modify postural abnormalities, address imbalance/dizziness, and instruct in home and community integration to attain remaining goals. [x]  See Plan of Care  []  See progress note/recertification  []  See Discharge Summary         Progress towards goals / Updated goals:  Short Term Goals:  To be accomplished in 4 weeks:  Patient will be independent and compliant with HEP to progress toward goals and restore functional mobility. Eval Status: issued at eval  Last PN:  compliance daily per pt report goal MET 8/19/22     Patient will improve FOTO score by 5 points to improve functional tolerance for exercise. Eval Status: FOTO 36   Status on 6/23/22: 53 - MET  FOTO score = an established functional score where 100 = no disability     Patient will improve pain in bilateral hips to 5/10  to improve standing tolerance and restore prior level of function. Eval Status: 10/10 at worst  Last PN 10/11/22: worst pain within the last week in hip = 8/10 (post fall) - slowly progressing   Current: 10/13/22 rates pain 7/10 this therapy session         Pt will increase MMT of hip strength by 1/5 in order to return to goals of improved walking. Eval Status:  Hip Left (1-5) Right (1-5)   Hip Flexion 2- 3+   Hip ABD 2 3-   Hip ADD 3- 4-   Last PN 10/11/22:  no change from last assessment- assessment done in supine; pt complete Hip ADB/ADD AAROM in supine with greater difficulty with left hip ABD   Current: 10/13/22 Pt ambulating backwards, engaging glutes and hamstrings     Pt will have 120 degrees of passive pain free hip flexion to aid in functional mechanics for ambulation/ADLs. Eval Status:   AROM                             PROM  Hip Left Right Left Right   Flexion 115 ! (propped) 105 ! 120 ! 110 ! ER 10 ! 20 ! 22 ! 20 ! IR 0 ! 40 ! 25 ! 40 ! Last PN  7/26/22: 145 deg functional hip Flexion in seated position when leaning fwd, MET     Long Term Goals: To be accomplished in 6 weeks:  Patient will improve FOTO score by 11 points to improve functional tolerance for ADLs. Eval Status: FOTO 36              Current 6/23/22: 53 - MET  FOTO score = an established functional score where 100 = no disability     2. Pt will be able to transfer sit to supine and supine to sit with no assistance to aid in functional mechanics for bed mobility.   Eval Status: Sit-Supine: Min A for left LE              Supine-Sit: min A for left LE management  Current: 6/29/22 pt independent for sit to supine, independent with supine to sit. - MET     3. Pt will ambulate 100 ft with SPC and no LOB in order to be a safe household ambulator. (Modified)  Eval Status: Pt able to ambulate 10 ft with SPC, decreased step height and length on left LE. Last PN: 10/11/22 not formally assessed  Current: 10/13/22: Pt ambulated in the parallel bars with min Ax1, therapist blocking left LE, right UE holding onto bar, forwards and backwards x2     4. Patient will improve pain in low back to 0/10 at worst to improve ADL tolerance and restore prior level of function. Eval Status: 10/10 at worst             Last PN 10/11/22 not assessed  Current:  10/13/22 rates pain 7/10 this therapy session      New Goal: 7/14/22              5. Pt will decrease 5x STS time by 5 seconds in order to improve functional strength needed for 5x. Status at PN: 7/14/22 7/14/22: 5x STS: 33 seconds  Last PN 10/11/22: no change since last assessed; pt was challenged putting equal weight on bilateral LE and occasionally needed to reposition her left LE to improve balance    Current: 10/13/22 Pt requires cuing to perform stand to sit transfer     Updated goal as of 7/26/22:   6.  Patient demonstrates ability to perform active left hip Ext /MMT >or= 3/5 indicating improved left hip/glut strength needed for better stability during left stance phase when walking  Status on 7/26/22: marked difficulty to perform active left hip Ext during standing and retro walking, unable to perform prone hip Ext, MMT 2/5 left glut max  Last PN 10/11/22: bridge 2x5 with left hip drop/lag and PT providing foot/table joint approximation to improve stability   Current: 10/13/22 not formally assessed    PLAN  []  Upgrade activities as tolerated     [x]  Continue plan of care  []  Update interventions per flow sheet       []  Discharge due to:_  []  Other:_ Terre Sandhoff, PT, DPT, REGLA 10/13/2022  7:51 AM    Future Appointments   Date Time Provider [place]   10/13/2022  8:15 AM Rajesh Part, PT [facility]   10/18/2022  7:30 AM Westly Pale, PT [facility]   10/20/2022  8:15 AM Rajesh Part, PT [facility]   10/25/2022  7:30 AM Westly Pale, PT [facility]   10/27/2022  8:15 AM Knott Part, PT [facility]   11/3/2022  8:15 AM Rajesh Part, PT [facility]   11/4/2022  8:15 AM Rajesh Part, PT [facility]   11/7/2022  7:30 AM Yumi Stuart, PT [facility]   11/9/2022  7:30 AM Nubia Ramos, PT, DPT [facility]   11/14/2022  7:30 AM Yumi Stuart, PT [facility]   11/16/2022  7:30 AM Rajesh Part, PT [facility]   11/21/2022  7:30 AM Yumi Stuart, PT [facility]   11/23/2022  7:30 AM Nubia Ramos, PT, DPT [facility]   11/28/2022  7:30 AM Yumi Stuart, PT [facility]   11/30/2022  7:30 AM Rajesh Part, PT [facility]

## 2022-10-18 ENCOUNTER — HOSPITAL ENCOUNTER (OUTPATIENT)
Dept: PHYSICAL THERAPY | Age: 57
Discharge: HOME | End: 2022-10-18
Payer: MEDICARE

## 2022-10-18 PROCEDURE — 97110 THERAPEUTIC EXERCISES: CPT

## 2022-10-18 PROCEDURE — 97530 THERAPEUTIC ACTIVITIES: CPT

## 2022-10-18 PROCEDURE — 97112 NEUROMUSCULAR REEDUCATION: CPT

## 2022-10-18 NOTE — PROGRESS NOTES
PT DAILY TREATMENT NOTE    Patient Name: Dominique Stephenson  Date:10/18/2022  : 1965  [x]  Patient  Verified  Payor: [employer] / Plan: Katrina Thapa [id number] HMO / Product Type: Managed Care Medicare /    In time:7:32am  Out time:8:19am  Total Treatment Time (min): 47  Total Timed Codes (min): 47  1:1 Treatment Time (MC/BCBS only): 52   Visit #: 23 of 31    Treatment Dx: Right hip pain [M25.551]  Left hip pain [M25.552]    SUBJECTIVE  Pain Level (0-10 scale): 6  Any medication changes, allergies to medications, adverse drug reactions, diagnosis change, or new procedure performed?: [x] No    [] Yes (see summary sheet for update)  Subjective functional status/changes:   [] No changes reported  The pt reported that she had another fall since the last treatment session due to a trip in the bathroom. The pt felt insulted by the EMS for suggesting that they have an inhome nurse to help with these situations instead of calling them each time she falls. The pt reported that she had considered a inhome nurse but they do not have the monitory means to hier one at this  time. OBJECTIVE      22 min Therapeutic Exercise:  [x] See flow sheet :   Rationale: increase ROM, increase strength, and improve coordination to improve the patients ability to perform daily activities with decreased pain and symptom levels    11 min Therapeutic Activity:  [x]  See flow sheet : pt education of sit to stand set up, scooting to the edge and, heel behind knee, and rotating/ leaning head away for optimal positioning of pelvis for a quick pivot from the table to her W/C.     Rationale: increase ROM, increase strength, and improve coordination  to improve the patients ability to perform daily activities with decreased pain and symptom levels     15 min Neuromuscular Re-education:  [x]  See flow sheet :   Rationale: increase ROM, increase strength, improve coordination, improve balance, and increase proprioception  to improve the patients ability to perform daily activities with decreased pain and symptom levels          With   [] TE   [] TA   [] neuro   [] other: Patient Education: [x] Review HEP    [] Progressed/Changed HEP based on:   [] positioning   [] body mechanics   [] transfers   [] heat/ice application    [] other:      Other Objective/Functional Measures: oculomotor testing post fall: H-test = minor over shooting, no pain or dizziness, tenderness to palpation of posterior cranium (no visible bruising)    Pt ambulated in the parallel bars with min Ax1 with VC/TC for weight shifting over left LE, right UE holding onto bar, forwards and backwards x2  Therapist blocking left knee into extension during weight shifting exercises, CGA/ MinAx1 to improve weight shift over left LE  Performed stand pivot transfer with quad cane w/c to table with min A x1  Pt education and Lety on hooking left foot over right for supine to sit transfer to improve independence   Lety for supine left hip ABD with towel slider     Pain Level (0-10 scale) post treatment: 4    ASSESSMENT/Changes in Function: The pt reported to therapy with a pleasant attitude and ready to participate in therapeutic exercises. Patient tolerated treatment session fairly today. Patient had no complaints with addition of SAQ to exercise program to accomplish an increase in quad strength and increase in pt's confidence of exercises. Use of gait belt during all standing exercises. Patient continues to make slow progress toward goals and would benefit from continued skilled PT intervention to address remaining deficits outlined in goals below.       Patient will continue to benefit from skilled PT services to modify and progress therapeutic interventions, address functional mobility deficits, address ROM deficits, address strength deficits, analyze and address soft tissue restrictions, analyze and cue movement patterns, analyze and modify body mechanics/ergonomics, assess and modify postural abnormalities, and address imbalance/dizziness to attain remaining goals. [x]  See Plan of Care  []  See progress note/recertification  []  See Discharge Summary         Progress towards goals / Updated goals:  Short Term Goals: To be accomplished in 4 weeks:  Patient will be independent and compliant with HEP to progress toward goals and restore functional mobility. Eval Status: issued at eval  Last PN:  compliance daily per pt report goal MET 8/19/22     Patient will improve FOTO score by 5 points to improve functional tolerance for exercise. Eval Status: FOTO 36   Status on 6/23/22: 53 - MET  FOTO score = an established functional score where 100 = no disability     Patient will improve pain in bilateral hips to 5/10  to improve standing tolerance and restore prior level of function. Eval Status: 10/10 at worst  Last PN 10/11/22: worst pain within the last week in hip = 8/10 (post fall) - slowly progressing   Current: 10/13/22 rates pain 7/10 this therapy session         Pt will increase MMT of hip strength by 1/5 in order to return to goals of improved walking. Eval Status:  Hip Left (1-5) Right (1-5)   Hip Flexion 2- 3+   Hip ABD 2 3-   Hip ADD 3- 4-   Last PN 10/11/22:  no change from last assessment- assessment done in supine; pt complete Hip ADB/ADD AAROM in supine with greater difficulty with left hip ABD   Current: 10/13/22 Pt ambulating backwards, engaging glutes and hamstrings     Pt will have 120 degrees of passive pain free hip flexion to aid in functional mechanics for ambulation/ADLs. Eval Status:   AROM                             PROM  Hip Left Right Left Right   Flexion 115 ! (propped) 105 ! 120 ! 110 ! ER 10 ! 20 ! 22 ! 20 ! IR 0 ! 40 ! 25 ! 40 ! Last PN  7/26/22: 145 deg functional hip Flexion in seated position when leaning fwd, MET     Long Term Goals:  To be accomplished in 6 weeks:  Patient will improve FOTO score by 11 points to improve functional tolerance for ADLs. Eval Status: FOTO 36              Current 6/23/22: 53 - MET  FOTO score = an established functional score where 100 = no disability     2. Pt will be able to transfer sit to supine and supine to sit with no assistance to aid in functional mechanics for bed mobility. Eval Status: Sit-Supine: Min A for left LE              Supine-Sit: min A for left LE management  Current: 6/29/22 pt independent for sit to supine, independent with supine to sit. - MET     3. Pt will ambulate 100 ft with SPC and no LOB in order to be a safe household ambulator. (Modified)  Eval Status: Pt able to ambulate 10 ft with SPC, decreased step height and length on left LE. Last PN: 10/11/22 not formally assessed  Current: 10/13/22: Pt ambulated in the parallel bars with min Ax1, therapist blocking left LE, right UE holding onto bar, forwards and backwards x2     4. Patient will improve pain in low back to 0/10 at worst to improve ADL tolerance and restore prior level of function. Eval Status: 10/10 at worst             Last PN 10/11/22 not assessed  Current:  10/13/22 rates pain 7/10 this therapy session      New Goal: 7/14/22              5. Pt will decrease 5x STS time by 5 seconds in order to improve functional strength needed for 5x. Status at PN: 7/14/22 7/14/22: 5x STS: 33 seconds  Last PN 10/11/22: no change since last assessed; pt was challenged putting equal weight on bilateral LE and occasionally needed to reposition her left LE to improve balance    Current: 10/13/22 Pt requires cuing to perform stand to sit transfer     Updated goal as of 7/26/22:   6.  Patient demonstrates ability to perform active left hip Ext /MMT >or= 3/5 indicating improved left hip/glut strength needed for better stability during left stance phase when walking  Status on 7/26/22: marked difficulty to perform active left hip Ext during standing and retro walking, unable to perform prone hip Ext, MMT 2/5 left glut max  Last PN 10/11/22: bridge 2x5 with left hip drop/lag and PT providing foot/table joint approximation to improve stability   Current: 10/13/22 not formally assessed      PLAN  []  Upgrade activities as tolerated     [x]  Continue plan of care  []  Update interventions per flow sheet       []  Discharge due to:_  []  Other:_      Flavia Montero, PT 10/18/2022  9:53 AM    Future Appointments   Date Time Provider Marty Jj   10/20/2022  8:15 AM Verndale Second, PT [facility]   10/25/2022  7:30 AM Ever Mosqueda, PT [facility]   10/27/2022  8:15 AM Verndale Second, PT [facility]   11/3/2022  8:15 AM Verndale Second, PT [facility]   11/4/2022  8:15 AM Verndale Second, PT [facility]   11/7/2022  7:30 AM Ketan Bernstein, PT [facility]   11/9/2022  7:30 AM Terrmariaelenae Loi, PT, DPT [facility]   11/14/2022  7:30 AM Ketan Bernstein, PT [facility]   11/16/2022  7:30 AM Verndale Second, PT [facility]   11/21/2022  7:30 AM Ketan Bernstein, PT [facility]   11/23/2022  7:30 AM Ellyilee Loi, PT, DPT [facility]   11/28/2022  7:30 AM Ketan Bernstein, PT [facility]   11/30/2022  7:30 AM Verndale Second, PT [facility]

## 2022-10-20 ENCOUNTER — HOSPITAL ENCOUNTER (OUTPATIENT)
Dept: PHYSICAL THERAPY | Age: 57
Discharge: HOME | End: 2022-10-20
Payer: MEDICARE

## 2022-10-20 PROCEDURE — 97530 THERAPEUTIC ACTIVITIES: CPT

## 2022-10-20 PROCEDURE — 97116 GAIT TRAINING THERAPY: CPT

## 2022-10-20 PROCEDURE — 97112 NEUROMUSCULAR REEDUCATION: CPT

## 2022-10-20 NOTE — PROGRESS NOTES
PT DAILY TREATMENT NOTE    Patient Name: Mark Carrington  Date:10/20/2022  : 1965  [x]  Patient  Verified  Payor: BLUE [employer]OSS MEDICARE / Plan: Parvbethanie Les [id number] HMO / Product Type: Managed Care Medicare /    In time:8:20  Out time:9:03  Total Treatment Time (min): 43  Total Timed Codes (min): 43  1:1 Treatment Time (MC/BCBS only): 43   Visit #: 24 of 31    Treatment Dx: Right hip pain [M25.551]  Left hip pain [M25.552]    SUBJECTIVE  Pain Level (0-10 scale): 4  Any medication changes, allergies to medications, adverse drug reactions, diagnosis change, or new procedure performed?: [x] No    [] Yes (see summary sheet for update)  Subjective functional status/changes:   [] No changes reported  Reports that she hasn't heard anything about the CT SCAN. Reports that Tuesday it felt like her hip was giving out and felt like it was happening the rest of the day. Reports that she is taking Ibuprofen and Tylenol for the pain. Reports that on Tuesday when she was getting up from the bed the leg gave out and she felt to the ground. Reports that she had to use her Emergency button to get help from the ground. Reports that her  wasn't around. Denies hitting head or hurting self with this fall. Reports that she started at a 4/10, but after sitting her pain inc to 6/10.     OBJECTIVE      15 min Therapeutic Activity:  []  See flow sheet :   Rationale: increase ROM, increase strength, improve coordination, improve balance, and increase proprioception  to improve the patients ability to perform daily activities with decreased pain and symptom levels       18 min Neuromuscular Re-education:  []  See flow sheet :   Rationale: increase ROM, increase strength, improve coordination, improve balance, and increase proprioception  to improve the patients ability to perform daily activities with decreased pain and symptom levels    10 min Gait Training:  Performed pre gait activities in parallel bars; took 2-3 steps with quad cane to transfer from table to and from wheelchair, requiring assistance with left LE, verbal cuing for placement of device, sequencing,   Rationale: To improve ambulation safety and efficiency in order to improve patient's ability to safely ambulate at home for self care. With   [] TE   [x] TA   [x] neuro   [] other: Patient Education: [x] Review HEP    [] Progressed/Changed HEP based on:   [x] positioning   [x] body mechanics   [x] transfers   [] heat/ice application    [x] other: educated pt on weight shifting while sitting in wheel chair, performing pelvic tilts while in the wheelchair, looking into getting someone in the home while  is at work. Educated pt on transfers with reaching back prior to sitting, educated pt on sit to stand transfer with shifting right knee ahead and digging with heels to engage hamstrings, educated pt calling MD in reference to CT scan and hip feeling it is falling out of place/slipping     Other Objective/Functional Measures: Pt enters gym in no apparent distress, in wheelchair. Pt performed sit to stand from w/c to parallel bars with CGAx1   Performed stand pivot transfer with quad cane w/c to table with mod A x1  Performed stand pivot transfer with quad cane table to w/c with Mod Ax1    Pain Level (0-10 scale) post treatment: 4/10    ASSESSMENT/Changes in Function: Patient tolerated therapy session well as there were no adverse reactions today. Pt reporting another fall in the home and therapist recommended not being alone and to look into a caregiver while  is at work, pt reporting financial concerns. Performed standing activities in the parallel bars and therapist providing blocking of left knee. Therapist had to assist with maneuvering left LE forward with weight shifting as well as performing left step taps on L1 box.  Attempted to perform step ups and as pt was trying to take the right leg off, the left leg gave way and therapist had to assist pt from falling and placed pt into w/c. Pt stated that's what happens without warning at home. Performed DD balance with manual pertubation and pt would loose balance very easily, nearly falling over without therapist assistance. Pt is progressing with therapy as indicated by pt tolerating increase in exercise repetitions and resistance. Although showing progress patient would benefit from continuation of skilled physical therapy to address the remaining limitations. Patient will continue to benefit from skilled PT services to modify and progress therapeutic interventions, address functional mobility deficits, address ROM deficits, address strength deficits, analyze and address soft tissue restrictions, analyze and cue movement patterns, analyze and modify body mechanics/ergonomics, assess and modify postural abnormalities, address imbalance/dizziness, and instruct in home and community integration to attain remaining goals. [x]  See Plan of Care  []  See progress note/recertification  []  See Discharge Summary         Progress towards goals / Updated goals:  Short Term Goals: To be accomplished in 4 weeks:  Patient will be independent and compliant with HEP to progress toward goals and restore functional mobility. Eval Status: issued at eval  Last PN:  compliance daily per pt report goal MET 8/19/22     Patient will improve FOTO score by 5 points to improve functional tolerance for exercise. Eval Status: FOTO 36   Status on 6/23/22: 53 - MET  FOTO score = an established functional score where 100 = no disability     Patient will improve pain in bilateral hips to 5/10  to improve standing tolerance and restore prior level of function.   Eval Status: 10/10 at worst  Last PN 10/11/22: worst pain within the last week in hip = 8/10 (post fall) - slowly progressing   Current: 10/20/22 rates pain 4/10 this therapy session         Pt will increase MMT of hip strength by 1/5 in order to return to goals of improved walking. Eval Status:  Hip Left (1-5) Right (1-5)   Hip Flexion 2- 3+   Hip ABD 2 3-   Hip ADD 3- 4-   Last PN 10/11/22:  no change from last assessment- assessment done in supine; pt complete Hip ADB/ADD AAROM in supine with greater difficulty with left hip ABD   Current: 10/13/22 Pt ambulating backwards, engaging glutes and hamstrings     Pt will have 120 degrees of passive pain free hip flexion to aid in functional mechanics for ambulation/ADLs. Eval Status:   AROM                             PROM  Hip Left Right Left Right   Flexion 115 ! (propped) 105 ! 120 ! 110 ! ER 10 ! 20 ! 22 ! 20 ! IR 0 ! 40 ! 25 ! 40 ! Last PN  7/26/22: 145 deg functional hip Flexion in seated position when leaning fwd, MET     Long Term Goals: To be accomplished in 6 weeks:  Patient will improve FOTO score by 11 points to improve functional tolerance for ADLs. Eval Status: FOTO 36              Current 6/23/22: 53 - MET  FOTO score = an established functional score where 100 = no disability     2. Pt will be able to transfer sit to supine and supine to sit with no assistance to aid in functional mechanics for bed mobility. Eval Status: Sit-Supine: Min A for left LE              Supine-Sit: min A for left LE management  Current: 6/29/22 pt independent for sit to supine, independent with supine to sit. - MET     3. Pt will ambulate 100 ft with SPC and no LOB in order to be a safe household ambulator. (Modified)  Eval Status: Pt able to ambulate 10 ft with SPC, decreased step height and length on left LE. Last PN: 10/11/22 not formally assessed  Current: 10/13/22: Pt ambulated in the parallel bars with min Ax1, therapist blocking left LE, right UE holding onto bar, forwards and backwards x2     4. Patient will improve pain in low back to 0/10 at worst to improve ADL tolerance and restore prior level of function.   Eval Status: 10/10 at worst             Last PN 10/11/22 not assessed  Current:  10/13/22 rates pain 7/10 this therapy session      New Goal: 7/14/22              5. Pt will decrease 5x STS time by 5 seconds in order to improve functional strength needed for 5x. Status at PN: 7/14/22 7/14/22: 5x STS: 33 seconds  Last PN 10/11/22: no change since last assessed; pt was challenged putting equal weight on bilateral LE and occasionally needed to reposition her left LE to improve balance    Current: 10/13/22 Pt requires cuing to perform stand to sit transfer     Updated goal as of 7/26/22:   6.  Patient demonstrates ability to perform active left hip Ext /MMT >or= 3/5 indicating improved left hip/glut strength needed for better stability during left stance phase when walking  Status on 7/26/22: marked difficulty to perform active left hip Ext during standing and retro walking, unable to perform prone hip Ext, MMT 2/5 left glut max  Last PN 10/11/22: bridge 2x5 with left hip drop/lag and PT providing foot/table joint approximation to improve stability   Current: 10/13/22 not formally assessed      PLAN  []  Upgrade activities as tolerated     [x]  Continue plan of care  []  Update interventions per flow sheet       []  Discharge due to:_  []  Other:_      Kathryn Treadwell, PT 10/20/2022  7:39 AM    Future Appointments   Date Time Provider Marty Jj   10/20/2022  8:15 AM Pricila Burns PT [facility]   10/25/2022  7:30 AM Susy Moss PT [facility]   10/27/2022  8:15 AM Pricila Burns PT [facility]   11/3/2022  8:15 AM Pricila Burns PT [facility]   11/4/2022  8:15 AM Pricila Burns PT [facility]   11/7/2022  7:30 AM Christina Szymanski PT [facility]   11/9/2022  7:30 AM Wilber Antony PT, DPT [facility]   11/14/2022  7:30 AM Christina Szymanski PT [facility]   11/16/2022  7:30 AM ELMO Dominguez [facility]   11/21/2022  7:30 AM ELMO Mcclendon [facility]   11/23/2022  7:30 AM Wilber Antony PT, DALLAST ROSALVA [facility]   11/28/2022  7:30 AM ELMO Mcclendon [facility]   11/30/2022  7:30 AM Rachel Reasons, PT [facility]

## 2022-10-25 ENCOUNTER — APPOINTMENT (OUTPATIENT)
Dept: PHYSICAL THERAPY | Age: 57
End: 2022-10-25
Payer: MEDICARE

## 2022-10-27 ENCOUNTER — APPOINTMENT (OUTPATIENT)
Dept: PHYSICAL THERAPY | Age: 57
End: 2022-10-27
Payer: MEDICARE

## 2022-11-01 ENCOUNTER — APPOINTMENT (OUTPATIENT)
Dept: PHYSICAL THERAPY | Age: 57
End: 2022-11-01
Payer: MEDICARE

## 2022-11-03 ENCOUNTER — HOSPITAL ENCOUNTER (OUTPATIENT)
Dept: PHYSICAL THERAPY | Age: 57
End: 2022-11-03
Payer: MEDICARE

## 2022-11-04 ENCOUNTER — APPOINTMENT (OUTPATIENT)
Dept: PHYSICAL THERAPY | Age: 57
End: 2022-11-04
Payer: MEDICARE

## 2022-11-07 ENCOUNTER — APPOINTMENT (OUTPATIENT)
Dept: PHYSICAL THERAPY | Age: 57
End: 2022-11-07
Payer: MEDICARE

## 2022-11-09 ENCOUNTER — HOSPITAL ENCOUNTER (OUTPATIENT)
Dept: PHYSICAL THERAPY | Age: 57
Discharge: HOME | End: 2022-11-09
Payer: MEDICARE

## 2022-11-09 PROCEDURE — 97112 NEUROMUSCULAR REEDUCATION: CPT

## 2022-11-09 PROCEDURE — 97530 THERAPEUTIC ACTIVITIES: CPT

## 2022-11-09 PROCEDURE — 97110 THERAPEUTIC EXERCISES: CPT

## 2022-11-09 NOTE — PROGRESS NOTES
PT DAILY TREATMENT NOTE    Patient Name: Mansi Willard  Date:2022  : 1965  [x]  Patient  Verified  Payor: Sadaf Barbara / Plan: Katrina Thapa [id number] HMO / Product Type: Managed Care Medicare /    In time: 7:38 am  Out time:8:31  Total Treatment Time (min): 53  Total Timed Codes (min): 53  1:1 Treatment Time (MC/BCBS only): 48   Visit #: 25 of 31    Treatment Dx: Right hip pain [M25.551]  Left hip pain [M25.552]    SUBJECTIVE  Pain Level (0-10 scale): 8  Any medication changes, allergies to medications, adverse drug reactions, diagnosis change, or new procedure performed?: [x] No    [] Yes (see summary sheet for update)  Subjective functional status/changes:   [] No changes reported  \"Well I almost fell this morning. I was just getting ready and my hip gave out (left). \"  Reported fell to the floor on Monday due to left hip giving out. Indicated that pain was in left lateral thigh. Saw PA associated with Dr. Rhett Edwards and felt could be a knee problem vs hip. Injection to knee several weeks ago relieved some sx. Is still waiting on insurance approval for CT scan of left hip. OBJECTIVE    15 min Therapeutic Exercise:  [x] See flow sheet :   Rationale: increase ROM, increase strength, improve coordination, improve balance, and increase proprioception to improve the patients ability to perform daily activities with decreased pain and symptom levels      23 min Therapeutic Activity:  [x]  See flow sheet : stand pivot transfers to table from chair and from table to chair  Discussion and education regarding sitting positions to decrease pain and decrease right lateralization and normalize WB with sitting.     Rationale: increase ROM, increase strength, improve coordination, improve balance, and increase proprioception  to improve the patients ability to .sm     15 min Neuromuscular Re-education:  [x]  See flow sheet : included tactile, positional cues and forced WB to left LE  Worked on sensing position with sitting to feel ischial tuberosities and left foot on the ground  Addressed right anterior hip shift with sitting to address pain and position with sitting. Rationale: increase ROM, increase strength, improve coordination, improve balance, and increase proprioception  to improve the patients ability to perform daily activities with decreased pain and symptom levels          With   [] TE   [] TA   [] neuro   [] other: Patient Education: [x] Review HEP    [] Progressed/Changed HEP based on:   [] positioning   [] body mechanics   [] transfers   [] heat/ice application    [] other:      Other Objective/Functional Measures:   Stand pivot transfers - Min - Mod Assist - more mod A to correct LOB  Sit to stand from 21 in surface, min A and forced WB to left LE- noted intermittent use of LE on table to brace for standing   Marked left LE IR and minimal contact with left foot on floor    In sitting noted marked right lateralization     Pain Level (0-10 scale) post treatment: 5    ASSESSMENT/Changes in Function:   Was able to decrease pain in left lateral thigh with adjustments to sitting - discussed feeling both ischial tuberositates on the chair, right anterior hip shift and was able to decrease pain almost immediate. Gave VC to feel both feet on ground before sitting - improved stability and decreased level of assistance with left foot on ground. Pt still awaiting permission for CT scan. Pt did report 1 fall this week and 1 near fall. Patient will continue to benefit from skilled PT services to modify and progress therapeutic interventions, address functional mobility deficits, address ROM deficits, address strength deficits, analyze and address soft tissue restrictions, analyze and cue movement patterns, analyze and modify body mechanics/ergonomics, assess and modify postural abnormalities, address imbalance/dizziness, and instruct in home and community integration to attain remaining goals. [x]  See Plan of Care  []  See progress note/recertification  []  See Discharge Summary         Progress towards goals / Updated goals:  Short Term Goals: To be accomplished in 4 weeks:  Patient will be independent and compliant with HEP to progress toward goals and restore functional mobility. Eval Status: issued at eval  Last PN:  compliance daily per pt report goal MET 8/19/22     Patient will improve FOTO score by 5 points to improve functional tolerance for exercise. Eval Status: FOTO 36   Status on 6/23/22: 53 - MET  FOTO score = an established functional score where 100 = no disability     Patient will improve pain in bilateral hips to 5/10  to improve standing tolerance and restore prior level of function. Eval Status: 10/10 at worst  Last PN 10/11/22: worst pain within the last week in hip = 8/10 (post fall) - slowly progressing   Current: 10/20/22 rates pain 4/10 this therapy session         Pt will increase MMT of hip strength by 1/5 in order to return to goals of improved walking. Eval Status:  Hip Left (1-5) Right (1-5)   Hip Flexion 2- 3+   Hip ABD 2 3-   Hip ADD 3- 4-   Last PN 10/11/22:  no change from last assessment- assessment done in supine; pt complete Hip ADB/ADD AAROM in supine with greater difficulty with left hip ABD   Current: 10/13/22 Pt ambulating backwards, engaging glutes and hamstrings     Pt will have 120 degrees of passive pain free hip flexion to aid in functional mechanics for ambulation/ADLs. Eval Status:   AROM                             PROM  Hip Left Right Left Right   Flexion 115 ! (propped) 105 ! 120 ! 110 ! ER 10 ! 20 ! 22 ! 20 ! IR 0 ! 40 ! 25 ! 40 ! Last PN  7/26/22: 145 deg functional hip Flexion in seated position when leaning fwd, MET     Long Term Goals: To be accomplished in 6 weeks:  Patient will improve FOTO score by 11 points to improve functional tolerance for ADLs.   Eval Status: FOTO 36              Current 6/23/22: 53 - MET  FOTO score = an established functional score where 100 = no disability     2. Pt will be able to transfer sit to supine and supine to sit with no assistance to aid in functional mechanics for bed mobility. Eval Status: Sit-Supine: Min A for left LE              Supine-Sit: min A for left LE management  Current: 6/29/22 pt independent for sit to supine, independent with supine to sit. - MET     3. Pt will ambulate 100 ft with SPC and no LOB in order to be a safe household ambulator. (Modified)  Eval Status: Pt able to ambulate 10 ft with SPC, decreased step height and length on left LE. Last PN: 10/11/22 not formally assessed  Current: 10/13/22: Pt ambulated in the parallel bars with min Ax1, therapist blocking left LE, right UE holding onto bar, forwards and backwards x2  11/9/22 ambulation not formally assessed worked on weight shifting in standing. Pt experiencing increased pain levels this session      4. Patient will improve pain in low back to 0/10 at worst to improve ADL tolerance and restore prior level of function. Eval Status: 10/10 at worst             Last PN 10/11/22 not assessed  Current:  10/13/22 rates pain 7/10 this therapy session      New Goal: 7/14/22              5. Pt will decrease 5x STS time by 5 seconds in order to improve functional strength needed for 5x. Status at PN: 7/14/22 7/14/22: 5x STS: 33 seconds  Last PN 10/11/22: no change since last assessed; pt was challenged putting equal weight on bilateral LE and occasionally needed to reposition her left LE to improve balance    Current: 11/9/22 progressing Sit to stand from 21 in surface, min A and forced WB to left LE- noted intermittent use of LE on table to brace for standing   Marked left LE IR and minimal contact with left foot on floor     Updated goal as of 7/26/22:   6.  Patient demonstrates ability to perform active left hip Ext /MMT >or= 3/5 indicating improved left hip/glut strength needed for better stability during left stance phase when walking  Status on 7/26/22: marked difficulty to perform active left hip Ext during standing and retro walking, unable to perform prone hip Ext, MMT 2/5 left glut max  Last PN 10/11/22: bridge 2x5 with left hip drop/lag and PT providing foot/table joint approximation to improve stability   Current: 11/9/22 not formally assessed      PLAN  []  Upgrade activities as tolerated     [x]  Continue plan of care  []  Update interventions per flow sheet       []  Discharge due to:_  []  Other:_      Lena Ramirez, PT, DPT 11/9/2022  7:38 AM    Future Appointments   Date Time Provider Marty Jj   11/10/2022  8:15 AM Linda Wilburn, PT [facility]   11/14/2022  7:30 AM Mag Hansen PT [facility]   11/16/2022  7:30 AM Linda Wilburn PT [facility]   11/21/2022  7:30 AM Mag Hansen PT [facility]   11/23/2022  7:30 AM Jacob Noland, PT, DPT [facility]   11/28/2022  7:30 AM Mag Hansen PT [facility]   11/30/2022  7:30 AM Linda Wilburn PT [facility]

## 2022-11-10 ENCOUNTER — HOSPITAL ENCOUNTER (OUTPATIENT)
Dept: PHYSICAL THERAPY | Age: 57
Discharge: HOME | End: 2022-11-10
Payer: MEDICARE

## 2022-11-10 PROCEDURE — 97112 NEUROMUSCULAR REEDUCATION: CPT

## 2022-11-10 PROCEDURE — 97116 GAIT TRAINING THERAPY: CPT

## 2022-11-10 PROCEDURE — 97530 THERAPEUTIC ACTIVITIES: CPT

## 2022-11-10 NOTE — PROGRESS NOTES
PT DAILY TREATMENT NOTE    Patient Name: Martin Hamm  Date:11/10/2022  : 1965  [x]  Patient  Verified  Payor: Nubia Hughes / Plan: Katrina Thapa [id number] HMO / Product Type: Managed Care Medicare /    In time:8:21  Out time:9:00  Total Treatment Time (min): 39  Total Timed Codes (min): 39  1:1 Treatment Time (MC/BCBS only): 44   Visit #: 26 of 31    Treatment Dx: Right hip pain [M25.551]  Left hip pain [M25.552]    SUBJECTIVE  Pain Level (0-10 scale): 6  Any medication changes, allergies to medications, adverse drug reactions, diagnosis change, or new procedure performed?: [x] No    [] Yes (see summary sheet for update)  Subjective functional status/changes:   [] No changes reported  Reports that the worst pain in the last 48 hrs 8/10. Reports that she is doing some sitting and standing at home. Reports that she uses a table to do the arm stretches. Reports that she does the LAQ with reach. Reports that she has had 4 falls in the last month. Reports that she called the MD office to talk to the CAT scan and they didn't have her records. Reports that she has to now call her MD. Reports that she has had some improvements in the last month, feeling more sturdy with sitting. Reports that when she feels herself swaying she feels she is able to center herself. Reports that she feels that therapy is benefiting her. Reports that she had a fall the day before yesterday and her left leg gave out. Reports that she has to catch herself when she starts to give out.      OBJECTIVE      10 min Therapeutic Activity:  []  See flow sheet :   Rationale: increase ROM, increase strength, improve coordination, improve balance, and increase proprioception  to improve the patients ability to perform daily activities with decreased pain and symptom levels       21 min Neuromuscular Re-education:  []  See flow sheet :   Rationale: increase ROM, increase strength, improve coordination, improve balance, and increase proprioception  to improve the patients ability to perform daily activities with decreased pain and symptom levels    8 min Gait Training:  Pt ambulated fwd and backwards in parallel bars with min to Mod assistance with left foot placement. Therapist had to block left knee with backwards walking as pt felt that it was going to give way   Rationale: To improve ambulation safety and efficiency in order to improve patient's ability to safely ambulate at home for self care. With   [] TE   [] TA   [] neuro   [] other: Patient Education: [x] Review HEP    [] Progressed/Changed HEP based on:   [] positioning   [] body mechanics   [] transfers   [] heat/ice application    [] other:      Other Objective/Functional Measures: Pt enters gym in no apparent distress, with w/c   Hip Left (1-5) Right (1-5)   Hip Flexion (supine)  (sitting) 2- 2+   Hip ABD (supine)  Sidelying  2  1 3  2-     Knee extension: right: 4+/5 left: 4-/5  Knee flexion: right: 4/5 left: 1/5    Hip extension: s/l 2/5 bilaterally    5x STS: 19\" table: 24.96 seconds-with right UE supports and standing to the quad cane. Bed Mobility: sit to supine: SBAx1  Sit to stand from w/c to quad cane: with CGA Ax1  Stand pivot from w/c to table with quad cane with Min Ax1  Stand pivot from table to w/c with quad cane with Min Ax1  Sit to stand from table: CGAx1    Standing balance without UE support from quad cane: 37 seconds    Pain Level (0-10 scale) post treatment: 5    ASSESSMENT/Changes in Function: Pt is a 65 yo female who presents to [facility] with c/o bilateral hip pain and low back pain. Past medical history is significant for spina bifida and left sided stroke. This is the patients 26th visit including eval on 6/1/22 and 5th visit since last PN on 10/11/22. Pt has had multiple falls recently and has had a decline due to recent fall.  Pt requires SBA with bed mobility and upto Min Ax1 with transfers and stand pivot transfers to and from wheel chair. Therapist was able to perform gait training in the parallel bars with up to mod Ax1 and therapist had to block the left LE. Pt requires frequent cuing on sitting posture and pelvic positioning and assuring that left heel is on the ground and not IR, which maybe a contributing factor to her falling. Pt continues to have pain in the left hip rating worst pain 8/10. Pt continues to have decrease in strength, and unsure how much it will improve. Pt would benefit from continuing with skilled PT to further address impairments. Patient will continue to benefit from skilled PT services to modify and progress therapeutic interventions, address functional mobility deficits, address ROM deficits, address strength deficits, analyze and address soft tissue restrictions, analyze and cue movement patterns, analyze and modify body mechanics/ergonomics, assess and modify postural abnormalities, address imbalance/dizziness, and instruct in home and community integration to attain remaining goals. []  See Plan of Care  [x]  See progress note/recertification  []  See Discharge Summary         Progress towards goals / Updated goals:  Short Term Goals: To be accomplished in 4 weeks:  Patient will be independent and compliant with HEP to progress toward goals and restore functional mobility. Eval Status: issued at eval  Last PN:  compliance daily per pt report goal MET 8/19/22     Patient will improve FOTO score by 5 points to improve functional tolerance for exercise. Eval Status: FOTO 36   Status on 6/23/22: 53 - MET  FOTO score = an established functional score where 100 = no disability     Patient will improve pain in bilateral hips to 5/10  to improve standing tolerance and restore prior level of function.   Eval Status: 10/10 at worst  Last PN 10/11/22: worst pain within the last week in hip = 8/10 (post fall) - slowly progressing   Current: 10/20/22 rates pain 4/10 this therapy session         Pt will increase MMT of hip strength by 1/5 in order to return to goals of improved walking. Eval Status:  Hip Left (1-5) Right (1-5)   Hip Flexion 2- 3+   Hip ABD 2 3-   Hip ADD 3- 4-   Last PN 10/11/22:  no change from last assessment- assessment done in supine; pt complete Hip ADB/ADD AAROM in supine with greater difficulty with left hip ABD   Current: 11/10/22 -no real change-maybe due to recent fall and inc in pain-goal to be met in 12 more weeks  Hip Left (1-5) Right (1-5)   Hip Flexion (supine)  (sitting) 2- 2+   Hip ABD (supine)  Sidelying  2  1 3  2-        Pt will have 120 degrees of passive pain free hip flexion to aid in functional mechanics for ambulation/ADLs. Eval Status:   AROM                             PROM  Hip Left Right Left Right   Flexion 115 ! (propped) 105 ! 120 ! 110 ! ER 10 ! 20 ! 22 ! 20 ! IR 0 ! 40 ! 25 ! 40 ! Last PN  7/26/22: 145 deg functional hip Flexion in seated position when leaning fwd, MET     Long Term Goals: To be accomplished in 6 weeks:  Patient will improve FOTO score by 11 points to improve functional tolerance for ADLs. Eval Status: FOTO 36              Current 6/23/22: 53 - MET  FOTO score = an established functional score where 100 = no disability     2. Pt will be able to transfer sit to supine and supine to sit with no assistance to aid in functional mechanics for bed mobility. Eval Status: Sit-Supine: Min A for left LE              Supine-Sit: min A for left LE management  Current: 6/29/22 pt independent for sit to supine, independent with supine to sit. - MET     3. Pt will ambulate 100 ft with SPC and no LOB in order to be a safe household ambulator. (Modified)  Eval Status: Pt able to ambulate 10 ft with SPC, decreased step height and length on left LE. Last PN: 10/11/22 not formally assessed  Current:11/10/22 Pt ambulated fwd and backwards in parallel bars with min to Mod assistance with left foot placement.  Therapist had to block left knee with backwards walking as pt felt that it was going to give way-progression since last PN-goal to be met in 12 more weeks    4. Patient will improve pain in low back to 0/10 at worst to improve ADL tolerance and restore prior level of function. Eval Status: 10/10 at worst             Last PN 10/11/22 not assessed  Current:  11/10/22 rates pain 6/10, worst pain 8/10-goal to be met in 12 more weeks     New Goal: 7/14/22              5. Pt will decrease 5x STS time by 5 seconds in order to improve functional strength needed for 5x. Status at PN: 7/14/22 7/14/22: 5x STS: 33 seconds  Last PN 10/11/22: no change since last assessed; pt was challenged putting equal weight on bilateral LE and occasionally needed to reposition her left LE to improve balance    Current: 11/10/22 5x STS: 19\" table: 24.96 seconds-with right UE supports and standing to the quad cane. noted intermittent use of LE on table to brace for standing;minimal contact with left foot on floor- progressing-goal to MET in 12 more weeks     Updated goal as of 7/26/22:   6. Patient demonstrates ability to perform active left hip Ext /MMT >or= 3/5 indicating improved left hip/glut strength needed for better stability during left stance phase when walking  Status on 7/26/22: marked difficulty to perform active left hip Ext during standing and retro walking, unable to perform prone hip Ext, MMT 2/5 left glut max  Last PN 10/11/22: bridge 2x5 with left hip drop/lag and PT providing foot/table joint approximation to improve stability   Current: 11/10/22 Hip extension: s/l 2/5 bilaterally, pt able to perform bridge, but requires verbal cuing for PPT-goal to be met in 13 more weeks    New goals: 11/10/22 to be met in 12 more weeks  1)Pt will be able to stand without UE support for 1 minute to assist with standing for ADLs.   Status at PN 11/10/22 Standing balance without UE support from quad cane: 37 seconds    2)Pt will be able to perform stand pivot transfer to and from wheel chair with SBAx1 so that pt can transfer to and from bed at home.   Status at PN: 11/10/22 Stand pivot from w/c to table with quad cane with Min Ax1; Stand pivot from table to w/c with quad cane with Min Ax1        PLAN  []  Upgrade activities as tolerated     [x]  Continue plan of care  []  Update interventions per flow sheet       []  Discharge due to:_  []  Other:_      Mary Marie, PT 11/10/2022  8:03 AM    Future Appointments   Date Time Provider Marty Jj   11/10/2022  8:15 AM Donal Conley PT [facility]   11/14/2022  7:30 AM Elliott Lagunas PT [facility]   11/16/2022  7:30 AM Donal Conley PT [facility]   11/21/2022  7:30 AM Elliott Lagunas PT [facility]   11/23/2022  7:30 AM Stephenie Pitts PT, DPT [facility]   11/28/2022  7:30 AM Elliott Lagunas PT [facility]   11/30/2022  7:30 AM Donal Conley PT [facility]

## 2022-11-10 NOTE — PROGRESS NOTES
[facility] at the [address], [place], [address] [facility]  Phone: [phone]      Fax:  [phone]    Continued Plan of Care/ Re-certification for Physical Therapy Services      Patient name: Mansi Willard Start of Care: 2022   Referral source: Emmett Miller MD : 1965   Medical/Treatment Diagnosis: Right hip pain [M25.551]  Left hip pain [M25.552] Onset Date:22       Prior Hospitalization: see medical history Provider#: [id number]   Medications: Verified on Patient Summary List     Comorbidities: Spina bifida, Stroke when she was 7, Depression, arthritis, Left RENATO in , revision    Prior Level of Function: Pt mainly wc bound but was able to walk  ft with SPC. Independent with WC. Visits from Start of Care: 26    Missed Visits: 8    Reporting Period: 10/11/22 to 11/10/22    The Plan of Care and following information is based on the patient's current status:    Key functional changes: Less assistance with bed mobility and transfers, please see assessment      Problems/ barriers to goal attainment: inc frequency of falling recently     Problem List: pain affecting function, decrease ROM, decrease strength, impaired gait/ balance, decrease ADL/ functional abilitiies, decrease activity tolerance, decrease flexibility/ joint mobility, and decrease transfer abilities    Treatment Plan: Therapeutic exercise, Neuromuscular reeducation, Manual therapy, Therapeutic activity, Self care/home management, and Gait training     Goals for this certification period to be accomplished in 12 more weeks:  Short Term Goals: To be accomplished in 4 weeks:  Patient will be independent and compliant with HEP to progress toward goals and restore functional mobility.   Eval Status: issued at eval  Last PN:  compliance daily per pt report goal MET 22     Patient will improve FOTO score by 5 points to improve functional tolerance for exercise. Eval Status: FOTO 36   Status on 6/23/22: 53 - MET  FOTO score = an established functional score where 100 = no disability     Patient will improve pain in bilateral hips to 5/10  to improve standing tolerance and restore prior level of function. Eval Status: 10/10 at worst  Last PN 10/11/22: worst pain within the last week in hip = 8/10 (post fall) - slowly progressing   Current: 10/20/22 rates pain 4/10 this therapy session         Pt will increase MMT of hip strength by 1/5 in order to return to goals of improved walking. Eval Status:  Hip Left (1-5) Right (1-5)   Hip Flexion 2- 3+   Hip ABD 2 3-   Hip ADD 3- 4-   Last PN 10/11/22:  no change from last assessment- assessment done in supine; pt complete Hip ADB/ADD AAROM in supine with greater difficulty with left hip ABD   Current: 11/10/22 -no real change-maybe due to recent fall and inc in pain-goal to be met in 12 more weeks  Hip Left (1-5) Right (1-5)   Hip Flexion (supine)  (sitting) 2- 2+   Hip ABD (supine)  Sidelying  2  1 3  2-        Pt will have 120 degrees of passive pain free hip flexion to aid in functional mechanics for ambulation/ADLs. Eval Status:   AROM                             PROM  Hip Left Right Left Right   Flexion 115 ! (propped) 105 ! 120 ! 110 ! ER 10 ! 20 ! 22 ! 20 ! IR 0 ! 40 ! 25 ! 40 ! Last PN  7/26/22: 145 deg functional hip Flexion in seated position when leaning fwd, MET     Long Term Goals: To be accomplished in 6 weeks:  Patient will improve FOTO score by 11 points to improve functional tolerance for ADLs. Eval Status: FOTO 36              Current 6/23/22: 53 - MET  FOTO score = an established functional score where 100 = no disability     2. Pt will be able to transfer sit to supine and supine to sit with no assistance to aid in functional mechanics for bed mobility.   Eval Status: Sit-Supine: Min A for left LE              Supine-Sit: min A for left LE management  Current: 6/29/22 pt independent for sit to supine, independent with supine to sit. - MET     3. Pt will ambulate 100 ft with SPC and no LOB in order to be a safe household ambulator. (Modified)  Eval Status: Pt able to ambulate 10 ft with SPC, decreased step height and length on left LE. Last PN: 10/11/22 not formally assessed  Current:11/10/22 Pt ambulated fwd and backwards in parallel bars with min to Mod assistance with left foot placement. Therapist had to block left knee with backwards walking as pt felt that it was going to give way-progression since last PN-goal to be met in 12 more weeks    4. Patient will improve pain in low back to 0/10 at worst to improve ADL tolerance and restore prior level of function. Eval Status: 10/10 at worst             Last PN 10/11/22 not assessed  Current:  11/10/22 rates pain 6/10, worst pain 8/10-goal to be met in 12 more weeks     New Goal: 7/14/22              5. Pt will decrease 5x STS time by 5 seconds in order to improve functional strength needed for 5x. Status at PN: 7/14/22 7/14/22: 5x STS: 33 seconds  Last PN 10/11/22: no change since last assessed; pt was challenged putting equal weight on bilateral LE and occasionally needed to reposition her left LE to improve balance    Current: 11/10/22 5x STS: 19\" table: 24.96 seconds-with right UE supports and standing to the quad cane. noted intermittent use of LE on table to brace for standing;minimal contact with left foot on floor- progressing-goal to MET in 12 more weeks     Updated goal as of 7/26/22:   6.  Patient demonstrates ability to perform active left hip Ext /MMT >or= 3/5 indicating improved left hip/glut strength needed for better stability during left stance phase when walking  Status on 7/26/22: marked difficulty to perform active left hip Ext during standing and retro walking, unable to perform prone hip Ext, MMT 2/5 left glut max  Last PN 10/11/22: bridge 2x5 with left hip drop/lag and PT providing foot/table joint approximation to improve stability   Current: 11/10/22 Hip extension: s/l 2/5 bilaterally, pt able to perform bridge, but requires verbal cuing for PPT-goal to be met in 13 more weeks    New goals: 11/10/22 to be met in 12 more weeks  1)Pt will be able to stand without UE support for 1 minute to assist with standing for ADLs. Status at PN 11/10/22 Standing balance without UE support from quad cane: 37 seconds    2)Pt will be able to perform stand pivot transfer to and from wheel chair with SBAx1 so that pt can transfer to and from bed at home. Status at PN: 11/10/22 Stand pivot from w/c to table with quad cane with Min Ax1; Stand pivot from table to w/c with quad cane with Min Ax1      Frequency / Duration: Patient to be seen 2 times per week for 12 more weeks:    Assessment / Recommendations:Pt is a 63 yo female who presents to [facility] with c/o bilateral hip pain and low back pain. Past medical history is significant for spina bifida and left sided stroke. This is the patients 26th visit including eval on 6/1/22 and 5th visit since last PN on 10/11/22. Pt has had multiple falls recently and has had a decline due to recent fall. Pt requires SBA with bed mobility and upto Min Ax1 with transfers and stand pivot transfers to and from wheel chair. Therapist was able to perform gait training in the parallel bars with up to mod Ax1 and therapist had to block the left LE. Pt requires frequent cuing on sitting posture and pelvic positioning and assuring that left heel is on the ground and not IR, which maybe a contributing factor to her falling. Pt continues to have pain in the left hip rating worst pain 8/10. Pt continues to have decrease in strength, and unsure how much it will improve. Pt would benefit from continuing with skilled PT to further address impairments.          Patient will continue to benefit from skilled PT services to modify and progress therapeutic interventions, address functional mobility deficits, address ROM deficits, address strength deficits, analyze and address soft tissue restrictions, analyze and cue movement patterns, analyze and modify body mechanics/ergonomics, assess and modify postural abnormalities, address imbalance/dizziness, and instruct in home and community integration to attain remaining goals. New Certification Period: 11/10/22-2/2/2023      Mary Marie, PT, DPT, CIMT 11/10/2022 8:17 AM    ________________________________________________________________________  I certify that the above Therapy Services are being furnished while the patient is under my care. I agree with the treatment plan and certify that this therapy is necessary. [] I have read the above and request that my patient continue as recommended.   [] I have read the above report and request that my patient continue therapy with the following changes/special instructions: _______________________________________  [] I have read the above report and request that my patient be discharged from therapy    Physician's Signature:____________________ Date:_________ TIME:________                                      Ovi Brooks MD      ** Signature, Date and Time must be completed for valid certification **    Please sign and return to [facility] at the [address], [address] [facility]  Phone: [phone]      Fax:  [phone]

## 2022-11-14 ENCOUNTER — HOSPITAL ENCOUNTER (OUTPATIENT)
Dept: PHYSICAL THERAPY | Age: 57
Discharge: HOME | End: 2022-11-14
Payer: MEDICARE

## 2022-11-14 PROCEDURE — 97110 THERAPEUTIC EXERCISES: CPT

## 2022-11-14 PROCEDURE — 97116 GAIT TRAINING THERAPY: CPT

## 2022-11-14 PROCEDURE — 97112 NEUROMUSCULAR REEDUCATION: CPT

## 2022-11-14 NOTE — PROGRESS NOTES
PT DAILY TREATMENT NOTE    Patient Name: Mavis Pickard  Date:2022  : 1965  [x]  Patient  Verified  Payor: Suha Galeana Christian / Plan: Katrina Thapa 8141 HMO / Product Type: Managed Care Medicare /    In time:735  Out time:820  Total Treatment Time (min): 45  Total Timed Codes (min): 45  1:1 Treatment Time (MC/BCBS only): 45   Visit #: 32 of 50    Treatment Dx: Right hip pain [M25.551]  Left hip pain [M25.552]    SUBJECTIVE  Pain Level (0-10 scale): 6 right hip  Any medication changes, allergies to medications, adverse drug reactions, diagnosis change, or new procedure performed?: [x] No    [] Yes (see summary sheet for update)  Subjective functional status/changes:   [] No changes reported  No falls over the weekend. Hip pain fluctuates. Reduction in walking mostly from bedroom to bathroom due to increased frequency of left hip giving out and causing extra instability. Mostly seated in w/c . Ortho visit recently for left knee pain- cortisone injection 'helped immensely'    OBJECTIVE        20 min Therapeutic Exercise:  [x] See flow sheet :   Rationale: increase ROM, increase strength, and improve coordination to improve the patients ability to perform ADL       15 min Neuromuscular Re-education:  [x]  See flow sheet :inhibition of left arm synergy, facilitation of more symmetrical trunk/pelvic alignment, manual facilitation/mobilization left rib cage to improve ZOA in combination with briding   Rationale: increase strength, improve coordination, improve balance, and increase proprioception  to improve the patients ability to perform ADL with improved alignment      10 min Gait Trainin feet with QC device on level surfaces with min level of assistance, needs VC for improved turns and correction of RAI prior to STS, 3 rest periods during gait   Rationale: To improve ambulation safety and efficiency in order to improve patient's ability to safely ambulate at home for self care. With   [x] TE   [] TA   [] neuro   [] other: Patient Education: [x] Review HEP    [] Progressed/Changed HEP based on:   [] positioning   [] body mechanics   [] transfers   [] heat/ice application    [] other:      Other Objective/Functional Measures:   - left elbow Ext to -65 deg, unable to perform active wrist Ext  -marked bilateral rib flare and LS compensation with all activities  -significant difficulty to initiate bridging without LS compensation, partial lift left pelvis     Pain Level (0-10 scale) post treatment: 4    ASSESSMENT/Changes in Function: patient needs frequent VC/manual assist for proper muscle engagement, reduction in LS compensation, mod assist needed for ambulation    Patient will continue to benefit from skilled PT services to address strength deficits, analyze and address soft tissue restrictions, analyze and cue movement patterns, assess and modify postural abnormalities, and imbalance and abnormal tone  to attain remaining goals. [x]  See Plan of Care  []  See progress note/recertification  []  See Discharge Summary         Progress towards goals / Updated goals:  Short Term Goals: To be accomplished in 4 weeks:  Patient will be independent and compliant with HEP to progress toward goals and restore functional mobility. Eval Status: issued at eval  Last PN:  compliance daily per pt report goal MET 8/19/22     Patient will improve FOTO score by 5 points to improve functional tolerance for exercise. Eval Status: FOTO 36   Status on 6/23/22: 53 - MET  FOTO score = an established functional score where 100 = no disability     Patient will improve pain in bilateral hips to 5/10  to improve standing tolerance and restore prior level of function.   Eval Status: 10/10 at worst  Last PN 10/11/22: worst pain within the last week in hip = 8/10 (post fall) - slowly progressing   Current: 11/20/22 , pain 6/10 at beginning of session, rates pain 4/10 at end of  therapy session, progressing         Pt will increase MMT of hip strength by 1/5 in order to return to goals of improved walking. Eval Status:  Hip Left (1-5) Right (1-5)   Hip Flexion 2- 3+   Hip ABD 2 3-   Hip ADD 3- 4-   Last PN 10/11/22:  no change from last assessment- assessment done in supine; pt complete Hip ADB/ADD AAROM in supine with greater difficulty with left hip ABD   Current: 11/10/22 -no real change-maybe due to recent fall and inc in pain-goal to be met in 12 more weeks  Hip Left (1-5) Right (1-5)   Hip Flexion (supine)  (sitting) 2- 2+   Hip ABD (supine)  Sidelying  2  1 3  2-         Pt will have 120 degrees of passive pain free hip flexion to aid in functional mechanics for ambulation/ADLs. Eval Status:   AROM                             PROM  Hip Left Right Left Right   Flexion 115 ! (propped) 105 ! 120 ! 110 ! ER 10 ! 20 ! 22 ! 20 ! IR 0 ! 40 ! 25 ! 40 ! Last PN  7/26/22: 145 deg functional hip Flexion in seated position when leaning fwd, MET     Long Term Goals: To be accomplished in 6 weeks:  Patient will improve FOTO score by 11 points to improve functional tolerance for ADLs. Eval Status: FOTO 36              Current 6/23/22: 53 - MET  FOTO score = an established functional score where 100 = no disability     2. Pt will be able to transfer sit to supine and supine to sit with no assistance to aid in functional mechanics for bed mobility. Eval Status: Sit-Supine: Min A for left LE              Supine-Sit: min A for left LE management  Current: 6/29/22 pt independent for sit to supine, independent with supine to sit. - MET     3. Pt will ambulate 100 ft with SPC and no LOB in order to be a safe household ambulator. (Modified)  Eval Status: Pt able to ambulate 10 ft with SPC, decreased step height and length on left LE. Last PN: 10/11/22 not formally assessed  Current:11/10/22 Pt ambulated fwd and backwards in parallel bars with min to Mod assistance with left foot placement.  Therapist had to block left knee with backwards walking as pt felt that it was going to give way-progression since last PN-goal to be met in 12 more weeks     4. Patient will improve pain in low back to 0/10 at worst to improve ADL tolerance and restore prior level of function. Eval Status: 10/10 at worst             Last PN 10/11/22 not assessed  Current:  11/10/22 rates pain 6/10, worst pain 8/10-goal to be met in 12 more weeks     New Goal: 7/14/22              5. Pt will decrease 5x STS time by 5 seconds in order to improve functional strength needed for 5x. Status at PN: 7/14/22 7/14/22: 5x STS: 33 seconds  Last PN 10/11/22: no change since last assessed; pt was challenged putting equal weight on bilateral LE and occasionally needed to reposition her left LE to improve balance    Current: 11/10/22 5x STS: 19\" table: 24.96 seconds-with right UE supports and standing to the quad cane. noted intermittent use of LE on table to brace for standing;minimal contact with left foot on floor- progressing-goal to MET in 12 more weeks     Updated goal as of 7/26/22:   6. Patient demonstrates ability to perform active left hip Ext /MMT >or= 3/5 indicating improved left hip/glut strength needed for better stability during left stance phase when walking  Status on 7/26/22: marked difficulty to perform active left hip Ext during standing and retro walking, unable to perform prone hip Ext, MMT 2/5 left glut max  Last PN 10/11/22: bridge 2x5 with left hip drop/lag and PT providing foot/table joint approximation to improve stability   Current: 11/10/22 Hip extension: s/l 2/5 bilaterally, pt able to perform bridge, but requires verbal cuing for PPT-goal to be met in 13 more weeks     New goals: 11/10/22 to be met in 12 more weeks  1)Pt will be able to stand without UE support for 1 minute to assist with standing for ADLs.   Status at PN 11/10/22 Standing balance without UE support from quad cane: 37 seconds     2)Pt will be able to perform stand pivot transfer to and from wheel chair with SBAx1 so that pt can transfer to and from bed at home.   Status at PN: 11/10/22 Stand pivot from w/c to table with quad cane with Min Ax1; Stand pivot from table to w/c with quad cane with Min Ax1        PLAN  []  Upgrade activities as tolerated     [x]  Continue plan of care  []  Update interventions per flow sheet       []  Discharge due to:_  []  Other:_      Sadaf Rodriguez, PT 11/14/2022  7:46 AM    Future Appointments   Date Time Provider [place] Jj   11/16/2022  7:30 AM Bisi Score, PT [facility]   11/21/2022  7:30 AM Burton Smallwood, PT [facility]   11/28/2022  7:30 AM Burton Smallwood PT [facility]   11/29/2022  7:30 AM Barbara Jolly PT [facility]   12/7/2022  8:15 AM Bisi Score, PT [facility]

## 2022-11-16 ENCOUNTER — APPOINTMENT (OUTPATIENT)
Dept: PHYSICAL THERAPY | Age: 57
End: 2022-11-16
Payer: MEDICARE

## 2022-11-21 ENCOUNTER — HOSPITAL ENCOUNTER (OUTPATIENT)
Dept: PHYSICAL THERAPY | Age: 57
Discharge: HOME | End: 2022-11-21
Payer: MEDICARE

## 2022-11-21 PROCEDURE — 97112 NEUROMUSCULAR REEDUCATION: CPT

## 2022-11-21 PROCEDURE — 97116 GAIT TRAINING THERAPY: CPT

## 2022-11-21 PROCEDURE — 97110 THERAPEUTIC EXERCISES: CPT

## 2022-11-21 NOTE — PROGRESS NOTES
PT DAILY TREATMENT NOTE    Patient Name: Cecilio Baron  Date:2022  : 1965  [x]  Patient  Verified  Payor: Neli Perdue / Plan: Katrina Thapa 8141 HMO / Product Type: Managed Care Medicare /    In time:730  Out time:819  Total Treatment Time (min): 49  Total Timed Codes (min): 49  1:1 Treatment Time (MC/BCBS only): 52  Visit #: 28 of 50    Treatment Dx: Right hip pain [M25.551]  Left hip pain [M25.552]    SUBJECTIVE  Pain Level (0-10 scale): 6  Any medication changes, allergies to medications, adverse drug reactions, diagnosis change, or new procedure performed?: [x] No    [] Yes (see summary sheet for update)  Subjective functional status/changes:   [] No changes reported  Reports fall last week coming out of bathroom. No c/o pain. Able to get up by herself. CT for right knee pain this coming Saturday. OBJECTIVE    20 min Therapeutic Exercise:  [x] See flow sheet :   Rationale: increase ROM, increase strength, and improve coordination to improve the patients ability to perform ADL       19 min Neuromuscular Re-education:  [x]  See flow sheet :   Rationale: increase ROM, increase strength, improve coordination, improve balance, and increase proprioception  to improve the patients ability to perform ADL with less fall risk      10 min Gait Trainin___ feet with quad cane device on level surfaces with min level of assistance   Rationale: To improve ambulation safety and efficiency in order to improve patient's ability to safely ambulate at home for self care.              With   [x] TE   [] TA   [] neuro   [] other: Patient Education: [x] Review HEP    [] Progressed/Changed HEP based on:   [] positioning   [] body mechanics   [] transfers   [] heat/ice application    [] other:      Other Objective/Functional Measures:   -continues with increased left trunk/arm tone, retracted hip/shoulder complex  -difficulty with lateralization to left during ambulation     Pain Level (0-10 scale) post treatment: 5    ASSESSMENT/Changes in Function: challenged with left elbow extension due to tone, WB on left side, use of left hand and PPT jack left hemipelvis, reduced pain with ambulation to 5/10    Patient will continue to benefit from skilled PT services to address ROM deficits, address strength deficits, analyze and address soft tissue restrictions, analyze and cue movement patterns, analyze and modify body mechanics/ergonomics, and assess and modify postural abnormalities to attain remaining goals. [x]  See Plan of Care  []  See progress note/recertification  []  See Discharge Summary         Progress towards goals / Updated goals:  Short Term Goals: To be accomplished in 4 weeks:  Patient will be independent and compliant with HEP to progress toward goals and restore functional mobility. Eval Status: issued at eval  Last PN:  compliance daily per pt report goal MET 8/19/22     Patient will improve FOTO score by 5 points to improve functional tolerance for exercise. Eval Status: FOTO 36   Status on 6/23/22: 53 - MET  FOTO score = an established functional score where 100 = no disability     Patient will improve pain in bilateral hips to 5/10  to improve standing tolerance and restore prior level of function. Eval Status: 10/10 at worst  Last PN 10/11/22: worst pain within the last week in hip = 8/10 (post fall) - slowly progressing   Current: 11/21/22 , pain 6/10 at beginning of session, rates pain 5/10 at end of  therapy session and with walking, progressing         Pt will increase MMT of hip strength by 1/5 in order to return to goals of improved walking.   Eval Status:  Hip Left (1-5) Right (1-5)   Hip Flexion 2- 3+   Hip ABD 2 3-   Hip ADD 3- 4-   Last PN 10/11/22:  no change from last assessment- assessment done in supine; pt complete Hip ADB/ADD AAROM in supine with greater difficulty with left hip ABD   Current: 11/10/22 -no real change-maybe due to recent fall and inc in pain-goal to be met in 12 more weeks  Hip Left (1-5) Right (1-5)   Hip Flexion (supine)  (sitting) 2- 2+   Hip ABD (supine)  Sidelying  2  1 3  2-         Pt will have 120 degrees of passive pain free hip flexion to aid in functional mechanics for ambulation/ADLs. Eval Status:   AROM                             PROM  Hip Left Right Left Right   Flexion 115 ! (propped) 105 ! 120 ! 110 ! ER 10 ! 20 ! 22 ! 20 ! IR 0 ! 40 ! 25 ! 40 ! Last PN  7/26/22: 145 deg functional hip Flexion in seated position when leaning fwd, MET     Long Term Goals: To be accomplished in 6 weeks:  Patient will improve FOTO score by 11 points to improve functional tolerance for ADLs. Eval Status: FOTO 36              Current 6/23/22: 53 - MET  FOTO score = an established functional score where 100 = no disability     2. Pt will be able to transfer sit to supine and supine to sit with no assistance to aid in functional mechanics for bed mobility. Eval Status: Sit-Supine: Min A for left LE              Supine-Sit: min A for left LE management  Current: 6/29/22 pt independent for sit to supine, independent with supine to sit. - MET     3. Pt will ambulate 100 ft with SPC and no LOB in order to be a safe household ambulator. (Modified)  Eval Status: Pt able to ambulate 10 ft with SPC, decreased step height and length on left LE. Last PN: 10/11/22 not formally assessed  Current:11/10/22 Pt ambulated fwd and backwards in parallel bars with min to Mod assistance with left foot placement. Therapist had to block left knee with backwards walking as pt felt that it was going to give way-progression since last PN-goal to be met in 12 more weeks     4. Patient will improve pain in low back to 0/10 at worst to improve ADL tolerance and restore prior level of function.   Eval Status: 10/10 at worst             Last PN 10/11/22 not assessed  Current:  11/21/22: no reported LBP, only right hip pain, progressing     New Goal: 7/14/22 5. Pt will decrease 5x STS time by 5 seconds in order to improve functional strength needed for 5x. Status at PN: 7/14/22 7/14/22: 5x STS: 33 seconds  Last PN 10/11/22: no change since last assessed; pt was challenged putting equal weight on bilateral LE and occasionally needed to reposition her left LE to improve balance    Current: 11/10/22 5x STS: 19\" table: 24.96 seconds-with right UE supports and standing to the quad cane. noted intermittent use of LE on table to brace for standing;minimal contact with left foot on floor- progressing-goal to MET in 12 more weeks     Updated goal as of 7/26/22:   6. Patient demonstrates ability to perform active left hip Ext /MMT >or= 3/5 indicating improved left hip/glut strength needed for better stability during left stance phase when walking  Status on 7/26/22: marked difficulty to perform active left hip Ext during standing and retro walking, unable to perform prone hip Ext, MMT 2/5 left glut max  Last PN 10/11/22: bridge 2x5 with left hip drop/lag and PT providing foot/table joint approximation to improve stability   Current: 11/10/22 Hip extension: s/l 2/5 bilaterally, pt able to perform bridge, but requires verbal cuing for PPT-goal to be met in 13 more weeks     New goals: 11/10/22 to be met in 12 more weeks  1)Pt will be able to stand without UE support for 1 minute to assist with standing for ADLs. Status at PN 11/10/22 Standing balance without UE support from quad cane: 37 seconds     2)Pt will be able to perform stand pivot transfer to and from wheel chair with SBAx1 so that pt can transfer to and from bed at home.   Status at PN: 11/10/22 Stand pivot from w/c to table with quad cane with Min Ax1; Stand pivot from table to w/c with quad cane with Min Ax1           PLAN  []  Upgrade activities as tolerated     [x]  Continue plan of care  []  Update interventions per flow sheet       []  Discharge due to:_  []  Other:_      Angel Paci, ELMO 11/21/2022  7:41 AM    Future Appointments   Date Time Provider Marty Анна   11/28/2022  7:30 AM ELMO Crabtree [facility]   11/30/2022  7:30 AM Tonnie Gosselin, PT [facility]   12/7/2022  8:15 AM Tonnie Gosselin, PT [facility]

## 2022-11-23 ENCOUNTER — APPOINTMENT (OUTPATIENT)
Dept: PHYSICAL THERAPY | Age: 57
End: 2022-11-23
Payer: MEDICARE

## 2022-11-28 ENCOUNTER — APPOINTMENT (OUTPATIENT)
Dept: PHYSICAL THERAPY | Age: 57
End: 2022-11-28
Payer: MEDICARE

## 2022-11-29 ENCOUNTER — APPOINTMENT (OUTPATIENT)
Dept: PHYSICAL THERAPY | Age: 57
End: 2022-11-29
Payer: MEDICARE

## 2022-11-30 ENCOUNTER — HOSPITAL ENCOUNTER (OUTPATIENT)
Dept: PHYSICAL THERAPY | Age: 57
Discharge: HOME | End: 2022-11-30
Payer: MEDICARE

## 2022-11-30 ENCOUNTER — APPOINTMENT (OUTPATIENT)
Dept: PHYSICAL THERAPY | Age: 57
End: 2022-11-30
Payer: MEDICARE

## 2022-11-30 PROCEDURE — 97112 NEUROMUSCULAR REEDUCATION: CPT

## 2022-11-30 PROCEDURE — 97530 THERAPEUTIC ACTIVITIES: CPT

## 2022-11-30 PROCEDURE — 97116 GAIT TRAINING THERAPY: CPT

## 2022-11-30 NOTE — PROGRESS NOTES
PT DAILY TREATMENT NOTE    Patient Name: Mavis Pickard  Date:2022  : 1965  [x]  Patient  Verified  Payor: BL[employer]ICARE / Plan: Katrina Thapa [id number] HMO / Product Type: Managed Care Medicare /    In time:7:32  Out time:8:15  Total Treatment Time (min): 43  Total Timed Codes (min): 43  1:1 Treatment Time (MC/BCBS only): 37   Visit #: 34 of 48    Treatment Dx: Right hip pain [M25.551]  Left hip pain [M25.552]    SUBJECTIVE  Pain Level (0-10 scale): 5  Any medication changes, allergies to medications, adverse drug reactions, diagnosis change, or new procedure performed?: [x] No    [] Yes (see summary sheet for update)  Subjective functional status/changes:   [] No changes reported  Reports that she went to see ortho MD and had CT scan and stated arthritis in the knee and an old fracture from years ago in the knee. Denies any falls since last therapy session. Reports she has been working on standing with better foot position of the left LE. OBJECTIVE      8  NC min Therapeutic Exercise:  [] See flow sheet :   Rationale: increase ROM, increase strength, improve coordination, improve balance, and increase proprioception to improve the patients ability to perform daily activities with decreased pain and symptom levels      10 min Therapeutic Activity:  []  See flow sheet :   Rationale: increase ROM, increase strength, improve coordination, improve balance, and increase proprioception  to improve the patients ability to perform daily activities with decreased pain and symptom levels       15 min Neuromuscular Re-education:  []  See flow sheet :   Rationale: increase ROM, increase strength, improve coordination, improve balance, and increase proprioception  to improve the patients ability to perform daily activities with decreased pain and symptom levels      10 min Gait Training:  Pt ambulated 1x16, 1x22 feet with quad cane device on level surfaces with min Ax1 level of assistance. Pt requires education on placement of device, foot placement, weight shifting, advancement of left LE. Performed gait activities in the parallel bars   Rationale: To improve ambulation safety and efficiency in order to improve patient's ability to safely ambulate at home for self care. With   [x] TE   [x] TA   [x] neuro   [] other: Patient Education: [x] Review HEP    [] Progressed/Changed HEP based on:   [x] positioning   [x] body mechanics   [x] transfers   [] heat/ice application    [] other:      Other Objective/Functional Measures: Pt enters gym in no apparent distress, in wheelchair   Performed stand pivot transfer to and from w/c and bed with quad cane with CGA to min Ax1  Standing balance: pt was able to  parallel bars with intermittent support from PT for 1 minute    Pain Level (0-10 scale) post treatment: 5/10    ASSESSMENT/Changes in Function: Patient tolerated therapy session well as there were no adverse reactions today. Pt requires frequent tactile and verbal cuing with placement of the left LE with transfers and with standing. Pt feeling fatigued at the end of therapy session so did not ambulate as far. Attempted to perform step tap with L1 step but pt had a hard time with removing the left LE from the step. Pt is progressing with therapy as indicated by pt tolerating increase in exercise repetitions and resistance. Although showing progress patient would benefit from continuation of skilled physical therapy to address the remaining limitations.        Patient will continue to benefit from skilled PT services to modify and progress therapeutic interventions, address functional mobility deficits, address ROM deficits, address strength deficits, analyze and address soft tissue restrictions, analyze and cue movement patterns, analyze and modify body mechanics/ergonomics, assess and modify postural abnormalities, address imbalance/dizziness, and instruct in home and community integration to attain remaining goals. [x]  See Plan of Care  []  See progress note/recertification  []  See Discharge Summary         Progress towards goals / Updated goals:  Short Term Goals: To be accomplished in 4 weeks:  Patient will be independent and compliant with HEP to progress toward goals and restore functional mobility. Eval Status: issued at eval  Last PN:  compliance daily per pt report goal MET 8/19/22     Patient will improve FOTO score by 5 points to improve functional tolerance for exercise. Eval Status: FOTO 36   Status on 6/23/22: 53 - MET  FOTO score = an established functional score where 100 = no disability     Patient will improve pain in bilateral hips to 5/10  to improve standing tolerance and restore prior level of function. Eval Status: 10/10 at worst  Last PN 11/10/22 rated pain 6/10  Current: 11/30/22 , pain 5/10 at beginning of session, progressing         Pt will increase MMT of hip strength by 1/5 in order to return to goals of improved walking. Eval Status:  Hip Left (1-5) Right (1-5)   Hip Flexion 2- 3+   Hip ABD 2 3-   Hip ADD 3- 4-   Last PN 11/10/22 -no real change-maybe due to recent fall and inc in pain-goal to be met in 12 more weeks  Hip Left (1-5) Right (1-5)   Hip Flexion (supine)  (sitting) 2- 2+   Hip ABD (supine)  Sidelying  2  1 3  2-   Current: 11/30/22 pt continues to present with dec in strength      Pt will have 120 degrees of passive pain free hip flexion to aid in functional mechanics for ambulation/ADLs. Eval Status:   AROM                             PROM  Hip Left Right Left Right   Flexion 115 ! (propped) 105 ! 120 ! 110 ! ER 10 ! 20 ! 22 ! 20 ! IR 0 ! 40 ! 25 ! 40 ! Last PN  7/26/22: 145 deg functional hip Flexion in seated position when leaning fwd, MET     Long Term Goals: To be accomplished in 6 weeks:  Patient will improve FOTO score by 11 points to improve functional tolerance for ADLs.   Eval Status: FOTO 36              Current 6/23/22: 53 - MET  FOTO score = an established functional score where 100 = no disability     2. Pt will be able to transfer sit to supine and supine to sit with no assistance to aid in functional mechanics for bed mobility. Eval Status: Sit-Supine: Min A for left LE              Supine-Sit: min A for left LE management  Current: 6/29/22 pt independent for sit to supine, independent with supine to sit. - MET     3. Pt will ambulate 100 ft with SPC and no LOB in order to be a safe household ambulator. (Modified)  Eval Status: Pt able to ambulate 10 ft with SPC, decreased step height and length on left LE. Last PN: 11/10/22 Pt ambulated fwd and backwards in parallel bars with min to Mod assistance with left foot placement. Therapist had to block left knee with backwards walking as pt felt that it was going to give way-progression since last PN-goal to be met in 12 more weeks  Current: 11/30/22 Pt ambulated 1x16, 1x22 feet with quad cane device on level surfaces with min Ax1 level of assistance. 4.   Patient will improve pain in low back to 0/10 at worst to improve ADL tolerance and restore prior level of function. Eval Status: 10/10 at worst             Last PN 11/10/22 rates pain 6/10, worst pain 8/10-goal to be met in 12 more weeks  Current: 11/30/22 rates pain 5/10     New Goal: 7/14/22              5. Pt will decrease 5x STS time by 5 seconds in order to improve functional strength needed for 5x. Status at PN: 7/14/22 7/14/22: 5x STS: 33 seconds  Last PN  11/10/22 5x STS: 19\" table: 24.96 seconds-with right UE supports and standing to the quad cane. noted intermittent use of LE on table to brace for standing;minimal contact with left foot on floor- progressing-goal to MET in 12 more weeks  Current: 11/30/22 pt continues to require cuing on proper technique and has increased time with STS     Updated goal as of 7/26/22:   6.  Patient demonstrates ability to perform active left hip Ext /MMT >or= 3/5 indicating improved left hip/glut strength needed for better stability during left stance phase when walking  Status on 7/26/22: marked difficulty to perform active left hip Ext during standing and retro walking, unable to perform prone hip Ext, MMT 2/5 left glut max  Last PN 11/10/22 Hip extension: s/l 2/5 bilaterally, pt able to perform bridge, but requires verbal cuing for PPT-goal to be met in 12 more weeks  Current: 11/30/22 pt continues to have difficulty with hip extension in standing     New goals: 11/10/22 to be met in 12 more weeks  1)Pt will be able to stand without UE support for 1 minute to assist with standing for ADLs. Status at PN 11/10/22 Standing balance without UE support from quad cane: 37 seconds  Current: 11/30/22 Standing balance: pt was able to  parallel bars with intermittent support from PT for 1 minute     2)Pt will be able to perform stand pivot transfer to and from wheel chair with SBAx1 so that pt can transfer to and from bed at home.   Status at PN: 11/10/22 Stand pivot from w/c to table with quad cane with Min Ax1; Stand pivot from table to w/c with quad cane with Min Ax1    Current: 11/30/22 Performed stand pivot transfer to and from w/c and bed with quad cane with CGA to min Ax1      PLAN  []  Upgrade activities as tolerated     [x]  Continue plan of care  []  Update interventions per flow sheet       []  Discharge due to:_  []  Other:_      Krishna Dennis, PT, DPT, [facility] 11/30/2022  7:27 AM    Future Appointments   Date Time Provider Marty Jj   11/30/2022  7:30 AM IrELMO Munguia [facility]   12/7/2022  8:15 AM IrELMO Munguia [facility]

## 2022-12-05 ENCOUNTER — TELEPHONE (OUTPATIENT)
Dept: PHYSICAL THERAPY | Age: 57
End: 2022-12-05

## 2022-12-07 ENCOUNTER — APPOINTMENT (OUTPATIENT)
Dept: PHYSICAL THERAPY | Age: 57
End: 2022-12-07
Payer: MEDICARE

## 2022-12-13 ENCOUNTER — APPOINTMENT (OUTPATIENT)
Dept: PHYSICAL THERAPY | Age: 57
End: 2022-12-13
Payer: MEDICARE

## 2022-12-14 ENCOUNTER — APPOINTMENT (OUTPATIENT)
Dept: PHYSICAL THERAPY | Age: 57
End: 2022-12-14
Payer: MEDICARE

## 2022-12-28 NOTE — PROGRESS NOTES
[facility] at the [address], [place], [address] [facility]  Phone: [phone]      Fax:  [phone]            Discharge Summary    Patient name: Gene Moore Start of Care: 2022   Referral source: Jabari Alicia MD : 1965   Medical/Treatment Diagnosis: Right hip pain [M25.551]  Left hip pain [M25.552] Onset Date:22       Prior Hospitalization: see medical history Provider#: [id number]   Medications: Verified on Patient Summary List     Comorbidities: Spina bifida, Stroke when she was 7, Depression, arthritis, Left RENATO in , revision    Prior Level of Function: Pt mainly wc bound but was able to walk  ft with SPC. Independent with WC. Visits from Start of Care: 29    Missed Visits: 8    Reporting Period : 22 to 22    Goals/Measure of Progress:  Short Term Goals: To be accomplished in 4 weeks:  Patient will be independent and compliant with HEP to progress toward goals and restore functional mobility. Eval Status: issued at eval  Last PN:  compliance daily per pt report goal MET 22     Patient will improve FOTO score by 5 points to improve functional tolerance for exercise. Eval Status: FOTO 36   Status on 22: 53 - MET  FOTO score = an established functional score where 100 = no disability     Patient will improve pain in bilateral hips to 5/10  to improve standing tolerance and restore prior level of function. Eval Status: 10/10 at worst  Last PN 11/10/22 rated pain 6/10  Current: 22 , pain 5/10 at beginning of session, progressing         Pt will increase MMT of hip strength by 1/5 in order to return to goals of improved walking.   Eval Status:  Hip Left (1-5) Right (1-5)   Hip Flexion 2- 3+   Hip ABD 2 3-   Hip ADD 3- 4-   Last PN 11/10/22 -no real change-maybe due to recent fall and inc in pain-goal to be met in 12 more weeks  Hip Left (1-5) Right (1-5)   Hip Flexion (supine)  (sitting) 2- 2+ Hip ABD (supine)  Sidelying  2  1 3  2-   Current: 11/30/22 pt continues to present with dec in strength      Pt will have 120 degrees of passive pain free hip flexion to aid in functional mechanics for ambulation/ADLs. Eval Status:   AROM                             PROM  Hip Left Right Left Right   Flexion 115 ! (propped) 105 ! 120 ! 110 ! ER 10 ! 20 ! 22 ! 20 ! IR 0 ! 40 ! 25 ! 40 ! Last PN  7/26/22: 145 deg functional hip Flexion in seated position when leaning fwd, MET     Long Term Goals: To be accomplished in 6 weeks:  Patient will improve FOTO score by 11 points to improve functional tolerance for ADLs. Eval Status: FOTO 36              Current 6/23/22: 53 - MET  FOTO score = an established functional score where 100 = no disability     2. Pt will be able to transfer sit to supine and supine to sit with no assistance to aid in functional mechanics for bed mobility. Eval Status: Sit-Supine: Min A for left LE              Supine-Sit: min A for left LE management  Current: 6/29/22 pt independent for sit to supine, independent with supine to sit. - MET     3. Pt will ambulate 100 ft with SPC and no LOB in order to be a safe household ambulator. (Modified)  Eval Status: Pt able to ambulate 10 ft with SPC, decreased step height and length on left LE. Last PN: 11/10/22 Pt ambulated fwd and backwards in parallel bars with min to Mod assistance with left foot placement. Therapist had to block left knee with backwards walking as pt felt that it was going to give way-progression since last PN-goal to be met in 12 more weeks  Current: 11/30/22 Pt ambulated 1x16, 1x22 feet with quad cane device on level surfaces with min Ax1 level of assistance. 4.   Patient will improve pain in low back to 0/10 at worst to improve ADL tolerance and restore prior level of function.   Eval Status: 10/10 at worst             Last PN 11/10/22 rates pain 6/10, worst pain 8/10-goal to be met in 12 more weeks  Current: 11/30/22 rates pain 5/10     New Goal: 7/14/22              5. Pt will decrease 5x STS time by 5 seconds in order to improve functional strength needed for 5x. Status at PN: 7/14/22 7/14/22: 5x STS: 33 seconds  Last PN  11/10/22 5x STS: 19\" table: 24.96 seconds-with right UE supports and standing to the quad cane. noted intermittent use of LE on table to brace for standing;minimal contact with left foot on floor- progressing-goal to MET in 12 more weeks  Current: 11/30/22 pt continues to require cuing on proper technique and has increased time with STS     Updated goal as of 7/26/22:   6. Patient demonstrates ability to perform active left hip Ext /MMT >or= 3/5 indicating improved left hip/glut strength needed for better stability during left stance phase when walking  Status on 7/26/22: marked difficulty to perform active left hip Ext during standing and retro walking, unable to perform prone hip Ext, MMT 2/5 left glut max  Last PN 11/10/22 Hip extension: s/l 2/5 bilaterally, pt able to perform bridge, but requires verbal cuing for PPT-goal to be met in 12 more weeks  Current: 11/30/22 pt continues to have difficulty with hip extension in standing     New goals: 11/10/22 to be met in 12 more weeks  1)Pt will be able to stand without UE support for 1 minute to assist with standing for ADLs. Status at PN 11/10/22 Standing balance without UE support from quad cane: 37 seconds  Current: 11/30/22 Standing balance: pt was able to  parallel bars with intermittent support from PT for 1 minute     2)Pt will be able to perform stand pivot transfer to and from wheel chair with SBAx1 so that pt can transfer to and from bed at home.   Status at PN: 11/10/22 Stand pivot from w/c to table with quad cane with Min Ax1; Stand pivot from table to w/c with quad cane with Min Ax1    Current: 11/30/22 Performed stand pivot transfer to and from w/c and bed with quad cane with CGA to min Ax1     Assessment/ Summary of Care:   Pt was seen for 29 visits due to back pain and bilateral hip pain. Pt was experiencing regular falls and PT was focusing on decreasing fall risk as well as improving overall mobility, decreasing pain an increasing strength. Pt is a D/C at this time due to being placed on hold by MD for Femur fracture. Will re-start PT once medically cleared. RECOMMENDATIONS  [x]Discontinue therapy: pt awaiting results of CT scan for possible femur fracture. Insurance authorization has .      Black Anderson, PT, DPT 2022 4:00 PM

## 2022-12-29 ENCOUNTER — HOSPITAL ENCOUNTER (OUTPATIENT)
Dept: PHYSICAL THERAPY | Age: 57
End: 2022-12-29
Payer: MEDICARE

## 2022-12-29 PROCEDURE — 97162 PT EVAL MOD COMPLEX 30 MIN: CPT

## 2022-12-29 NOTE — PROGRESS NOTES
[facility] at the [address], [place], [address] [facility]  Phone: [phone]      Fax:  [phone]      Plan of Care/ Statement of Necessity for Physical Therapy Services      Patient name: Monica Perkins Start of Care: 2022   Referral source: Elvira Estrada MD : 1965    Medical Diagnosis: Pain in left knee [M25.562]   Onset Date:1 month ago    Treatment Diagnosis: left knee pain   Prior Hospitalization: see medical history Provider#: [id number]   Medications: Verified on Patient summary List    Co-morbidities: PMHx/Surgical Hx: []DM [] HTN (controlled) [] High cholesterol (controlled) [] Cancer [x]Other  Spina bifida, Stroke when she was 7, Depression, arthritis, Left RENATO in , revision , revision   Prior Level of Function:  functionally required assistance with ADLs, required up to mod/max assistance with ambulation   Social/Recreation/Work: Work Hx: n/a  Living Situation: with       The Plan of Care and following information is based on the information from the initial evaluation. Assessment/ sevilla information:Emilia Diego is a 62 y.o.  yo female who [facility] to [facility] diagnosed with arthritis of left knee, closed fracture of tibial plateau with routine healing. Patient is s/p fall approx 1 month ago and per pt MD noticed an old tibial fracture. Per MD order OK foe WBAT with cane, avoid heavy lifting PT, ok for ROM exercises with daily ROM. Pt with a history of Spina bifida, Stroke when she was 7, arthritis, Left RENATO in , revision , and another revision in . Patient reports having constant aching pain at the left lateral knee that increases with standing and walking, which limites her ability with bed mobility, dressing, transfers, ambulation, and ADLs.  Pt has relief with Tylenol and rest. Pt rates pain _6__/10 max (in the last 48 hrs) __4_/10 min (in the last 48 hrs) _5__/10 currently at rest. Patient demonstrates decreased ROM, decreased strength, impaired posture, increased swelling, impaired balance, and decreased functional mobility tolerance. Pt is tender to palpate  along the left vastas lateralis, left lateral knee joint line, and left lateral tibial condyle. Pt also requires assistance with bed mobility, transfers, and gait. Pt also with dec in lower trunk rotation. Pt signs and symptoms are consistent with mechanical lumbo-pelvic femoral dysfunction. Pt would benefit from skilled physical therapy to address these limitations. Patient will benefit from skilled PT services to modify and progress therapeutic interventions, address functional mobility deficits, address ROM deficits, address strength deficits, analyze and address soft tissue restrictions, analyze and cue movement patterns, analyze and modify body mechanics/ergonomics, assess and modify postural abnormalities, and address imbalance/dizziness to attain remaining goals. Evaluation Complexity History HIGH Complexity :3+ comorbidities / personal factors will impact the outcome/ POC ; Examination HIGH Complexity : 4+ Standardized tests and measures addressing body structure, function, activity limitation and / or participation in recreation  ;Presentation HIGH Complexity : Unstable and unpredictable characteristics  ; Clinical Decision Making MEDIUM Complexity : FOTO score of 26-74 FOTO score = an established functional score where 100 = no disability  Overall Complexity Rating: MEDIUM  Problem List: pain affecting function, decrease ROM, decrease strength, edema affecting function, impaired gait/ balance, decrease ADL/ functional abilitiies, decrease activity tolerance, decrease flexibility/ joint mobility, and decrease transfer abilities     Treatment Plan may include any combination of the following: Therapeutic exercise, Neuromuscular reeducation, Manual therapy, Therapeutic activity, Self care/home management, Electric stim unattended , Vasopneumatic device, Gait training, and Electric stim attended  Vasopnuematic compression justification:  Per bilateral girth measures taken and listed above the edema is considered significant and having an impact on the patient's strength, balance, gait, transfers, self care, and ADLs  Patient / Family readiness to learn indicated by: asking questions, trying to perform skills, and interest  Persons(s) to be included in education: patient (P)  Barriers to Learning/Limitations: None  Patient Goal (s): \"to make knee stronger\"  Patient Self Reported Health Status: fair  Rehabilitation Potential: fair    Short Term Goals: To be accomplished in 2 weeks: 1. Patient will report compliance with HEP 1x/day to aid in rehabilitation program.  Status at IE: Provided pt with HEP and pt appeared to understand. Current:Same as IE     2. Patient will rate pain on greater than 5/10 so pt can perform ADL's. Status at IE:Pt rates pain _6__/10 max (in the last 48 hrs) __4_/10 min (in the last 48 hrs) _5__/10 currently at rest  Current: Same as IE    Long Term Goals: To be accomplished in 12 weeks: 1. Patient will increase limited Bilateral LE strength by at least . 5 grade MMT throughout so pt can perform transfers easier. Status at IE:   Left (0-5) Right (0-5)   Knee extension 3+ (pain) 4+   Knee flexion 2- 4   Ankle Dorsiflexion (L4) 0 1   Hip flexion 2- 3-   Hip abduction 1 2-   Current: Same as IE    2. Patient will report pain no greater than 4/10 throughout entire day to aid in completion of ADLs. Status at IE:Pt rates pain _6__/10 max (in the last 48 hrs) __4_/10 min (in the last 48 hrs) _5__/10 currently at rest  Current: Same as IE    3. Patient will increase limited knee left ROM by 10 degrees all planes so pt can  objects off the ground. Status at IE:   Knee Left Right   Extension 0 (pain) 0   Flexion 111 (AAROM), pain 130   Current: Same as IE    4. Patient will improve FOTO score to 47 points to demonstrate improvement in functional status. Status at IE:37  Current: Same as IE  *FOTO score is an established functional score where 100 = no disability*    5. Pt will be able to perform sit to stand transfer with SBAx1 and stand pivot transfer with min Ax1 so pt can assist with getting in and out of wheelchair  Status at IE: Stand Pivot to and from w/c and table, mod assistance x1 with quad cane, requiring assistance with left LE to step back to sit; Transfers: Sit to stand transfer from w/c to quad cane CGAx1; sit to stand transfer from table to quad cane with CGAx1, pt requires cuing with placement of left LE    6. Pt will have 5 second improvement in 5x STS test and 3 second improvement in TUG so pt can perform ADLs. Status at IE: 5x STS: From 20 inch table: 31.31 seconds, no use of the left LE, to quad cane, legs pressing against table; TUG: From 20\" table with quad cane, with min Ax1: 48.95 seconds    Frequency / Duration: Patient to be seen 2 times per week for 12  weeks. Patient/ Caregiver education and instruction: Diagnosis, prognosis, self care, activity modification, and exercises   [x]  Plan of care has been reviewed with PTA    Certification Period: 3/29/2023  Lyndon Freire PT, DPT, [facility] 12/29/2022 7:23 AM  _____________________________________________________________________  I certify that the above Therapy Services are being furnished while the patient is under my care. I agree with the treatment plan and certify that this therapy is necessary.     [de-identified] Signature:____________Date:_________TIME:________                                      Reese Marie MD    ** Signature, Date and Time must be completed for valid certification **    Please sign and return to [facility] at the [address], [facility], [address] [place] [address]       Phone: [phone]      Fax:  [phone]

## 2022-12-29 NOTE — PROGRESS NOTES
PT DAILY TREATMENT NOTE/Knee Evaluation    Patient Name: Myla Lawson  Date:2022  : 1965  [x]  Patient  Verified  Payor: Mey Rowland / Plan: Katrina Thapa 8141 HMO / Product Type: Managed Care Medicare /    In time:7:34  Out time:8:15  Total Treatment Time (min): 41  Total Timed Codes (min): 0  1:1 Treatment Time ([address] only): 0   Visit #: 1 of 24    Treatment Area: Pain in left knee [M25.562]    SUBJECTIVE  Any medication changes, allergies to medications, adverse drug reactions, diagnosis change, or new procedure performed?: [x] No    [] Yes (see summary sheet for update)  Hx Present Illness: Subjective: Pt reports that she started having pain in the knee approx 1 month ago from a fall. Reports that she fell in the bathroom onto the left knee. Reports that she went to see Dr. Kaity Brown assistance who gave Cortizone injection in the knee and stay off of it for a while. Reports that she had an X-ray and was concerned for an old fracture. Reports that 2 weeks ago saw MD who took another X-ray and stated the fracture wasn't worse and referred to PT. Reports that the leg feels like it wants to give out. Reports that she had a fall 2 weeks ago. Reports that she has had 12 falls in the last 6 months.      Associated symptoms: denies    Pain:    Location: left lateral knee _6__/10 max (in the last 48 hrs) __4_/10 min (in the last 48 hrs) _5__/10 currently at rest      [] Sharp    [] Dull      [] Burning     [x]  Aching     [] Throbbing      [] Tingling     [] Other:       [x]  Constant                   [] Intermittent      Increases Pain: standing, walking  Decreases Pain: Tylenol, rest  Since Onset:     Better     Functional Limitations: bed mobility, dressing, transfers, ambulation, and ADLs    Previous treatment:   PT for the hip    Co-morbidities: PMHx/Surgical Hx: []DM [] HTN (controlled) [] High cholesterol (controlled) [] Cancer [x]Other  Spina bifida, Stroke when she was 7, Depression, arthritis, Left RENATO in 2007, revision 2009, revision 2011  Prior Level of Function:  functionally required assistance with ADLs, required up to mod/max assistance with ambulation   Social/Recreation/Work: Work Hx: n/a  Living Situation: with          Diagnostic Testing:  [] Lab work [x] X-rays    [] CT [] MRI     [] Other:  Results:    Patient Goal(s): \"to make knee stronger\"    Barriers: [] none []pain []financial []time []transportation [x]Other PMHx  Motivation: good  Substance use:[x] none  []Alcohol []Tobacco []other:   Cognition: A & O x 3    Objective:    Pt enters gym in no apparent distress, in wheelchair, holding quad cane    Posture: [] Poor    [] Fair    [] Good    Describe: anterior pelvic tilt, left hip in IR    Outcome measure: FOTO  Score=37    Movement/gait:  [] Normal     [x] Abnormal: Pt ambulates with quad cane with min to mod assistance x1 with step to, slight step through pattern, with bilateral AFO braces.    Stand Pivot to and from w/c and table, mod assistance x1 with quad cane, requiring assistance with left LE to step back to sit  Bed Mobility: Pt required minAx1 with LE for sit to supine and supine to sit  Transfers: Sit to stand transfer from w/c to quad cane CGAx1; sit to stand transfer from table to quad cane with CGAx1, pt requires cuing with placement of left LE     Edema: slight swelling noted at the left and right knee    Palpation: TTP:  left vastas lateralis, left lateral joint line, left lateral tibial condyle,     Patellar Mobility: []Left  []Right Hypermobile [x]Left []Right Hypomobile                                 ROM:  [] Unable to assess at this time                 Knee Left Right   Extension 0 (pain) 0   Flexion 111 (AAROM), pain 130      Strength:   [] Unable to assess at this time    Left (0-5) Right (0-5) N/T   Knee extension 3+ (pain) 4+ []   Knee flexion 2- 4 []   Ankle Dorsiflexion (L4) 0 1 []   Hip flexion 2- 3- []   Hip abduction 1 2- [] Bridge: minimal lift of sacrum from the table        Balance: Standing balance: 21.51 seconds without quad cane  5x STS: From 20 inch table: 31.31 seconds, no use of the left LE, to quad cane, legs pressing against table   TUG: From 20\" table with quad cane, with min Ax1: 48.95 seconds      Neuro Screen [x] WNL Intact to light touch in bilateral LE. Myotome/Dermatome/Reflexes:  Comments:    Special Test:  Lower trunk rotation (inches): Right: 17 left: 17      41 min [x]Eval                  []Re-Eval       NC min Therapeutic Exercise:  [x] See flow sheet :   Rationale: increase ROM and increase strength to improve the patients ability to perform daily activities with decreased pain and symptom levels     NC min Therapeutic Activity:  [x]  See flow sheet :   Rationale: assess ROM and assess strength  to improve the patients ability to perform daily activities with decreased pain and symptom levels      NC min Neuromuscular Re-education:  []  See flow sheet :   Rationale: increase balance and coordination  to improve the patients ability to perform daily activities with decreased pain and symptom levels           With   [x] TE   [x] TA   [] neuro   [] other: Patient Education: [x] Review HEP    [] Progressed/Changed HEP based on:   [] positioning   [x] body mechanics   [] transfers   [] heat/ice application    [x] other: educated pt on bed mobility with log roll technique, educated pt on ensuring left LE placement, educated pt on pelvic positioning with sitting       Pain Level (0-10 scale) post treatment: 5/10    Assessment/ sevilla information:  Lissette Dukes is a 62 y.o.  yo female who presents to [facility] diagnosed with arthritis of left knee, closed fracture of tibial plateau with routine healing. Patient is s/p fall approx 1 month ago and per pt MD noticed an old tibial fracture. Per MD order OK foe WBAT with cane, avoid heavy lifting PT, ok for ROM exercises with daily ROM.  Pt with a history of Spina bifida, Stroke when she was 7, arthritis, Left RENATO in 2007, revision 2009, and another revision in 2011. Patient reports having constant aching pain at the left lateral knee that increases with standing and walking, which limites her ability with bed mobility, dressing, transfers, ambulation, and ADLs. Pt has relief with Tylenol and rest. Pt rates pain _6__/10 max (in the last 48 hrs) __4_/10 min (in the last 48 hrs) _5__/10 currently at rest. Patient demonstrates decreased ROM, decreased strength, impaired posture, increased swelling, impaired balance, and decreased functional mobility tolerance. Pt is tender to palpate  along the left vastas lateralis, left lateral knee joint line, and left lateral tibial condyle. Pt also requires assistance with bed mobility, transfers, and gait. Pt also with dec in lower trunk rotation. Pt signs and symptoms are consistent with mechanical lumbo-pelvic femoral dysfunction. Pt would benefit from skilled physical therapy to address these limitations. Patient will benefit from skilled PT services to modify and progress therapeutic interventions, address functional mobility deficits, address ROM deficits, address strength deficits, analyze and address soft tissue restrictions, analyze and cue movement patterns, analyze and modify body mechanics/ergonomics, assess and modify postural abnormalities, and address imbalance/dizziness to attain remaining goals. [x]  See Plan of Care  []  See progress note/recertification  []  See Discharge Summary    Evaluation Complexity History HIGH Complexity :3+ comorbidities / personal factors will impact the outcome/ POC ; Examination HIGH Complexity : 4+ Standardized tests and measures addressing body structure, function, activity limitation and / or participation in recreation  ;Presentation HIGH Complexity : Unstable and unpredictable characteristics  ; Clinical Decision Making MEDIUM Complexity : FOTO score of 26-74 FOTO score = an established functional score where 100 = no disability  Overall Complexity Rating: MEDIUM  Problem List: pain affecting function, decrease ROM, decrease strength, edema affecting function, impaired gait/ balance, decrease ADL/ functional abilitiies, decrease activity tolerance, decrease flexibility/ joint mobility, and decrease transfer abilities     Treatment Plan may include any combination of the following: Therapeutic exercise, Neuromuscular reeducation, Manual therapy, Therapeutic activity, Self care/home management, Electric stim unattended , Vasopneumatic device, Gait training, and Electric stim attended  Vasopnuematic compression justification:  Per bilateral girth measures taken and listed above the edema is considered significant and having an impact on the patient's strength, balance, gait, transfers, self care, and ADLs  Patient / Family readiness to learn indicated by: asking questions, trying to perform skills, and interest  Persons(s) to be included in education: patient (P)  Barriers to Learning/Limitations: None  Patient Goal (s): \"to make knee stronger\"  Patient Self Reported Health Status: fair  Rehabilitation Potential: fair    Short Term Goals: To be accomplished in 2 weeks: 1. Patient will report compliance with HEP 1x/day to aid in rehabilitation program.  Status at IE: Provided pt with HEP and pt appeared to understand. Current:Same as IE     2. Patient will rate pain on greater than 5/10 so pt can perform ADL's. Status at IE:Pt rates pain _6__/10 max (in the last 48 hrs) __4_/10 min (in the last 48 hrs) _5__/10 currently at rest  Current: Same as IE    Long Term Goals: To be accomplished in 12 weeks: 1. Patient will increase limited Bilateral LE strength by at least . 5 grade MMT throughout so pt can perform transfers easier.   Status at IE:   Left (0-5) Right (0-5)   Knee extension 3+ (pain) 4+   Knee flexion 2- 4   Ankle Dorsiflexion (L4) 0 1   Hip flexion 2- 3-   Hip abduction 1 2- Current: Same as IE    2. Patient will report pain no greater than 4/10 throughout entire day to aid in completion of ADLs. Status at IE:Pt rates pain _6__/10 max (in the last 48 hrs) __4_/10 min (in the last 48 hrs) _5__/10 currently at rest  Current: Same as IE    3. Patient will increase limited knee left ROM by 10 degrees all planes so pt can  objects off the ground. Status at IE:   Knee Left Right   Extension 0 (pain) 0   Flexion 111 (AAROM), pain 130   Current: Same as IE    4. Patient will improve FOTO score to 47 points to demonstrate improvement in functional status. Status at IE:37  Current: Same as IE  *FOTO score is an established functional score where 100 = no disability*    5. Pt will be able to perform sit to stand transfer with SBAx1 and stand pivot transfer with min Ax1 so pt can assist with getting in and out of wheelchair  Status at IE: Stand Pivot to and from w/c and table, mod assistance x1 with quad cane, requiring assistance with left LE to step back to sit; Transfers: Sit to stand transfer from w/c to quad cane CGAx1; sit to stand transfer from table to quad cane with CGAx1, pt requires cuing with placement of left LE    6. Pt will have 5 second improvement in 5x STS test and 3 second improvement in TUG so pt can perform ADLs. Status at IE: 5x STS: From 20 inch table: 31.31 seconds, no use of the left LE, to quad cane, legs pressing against table; TUG: From 20\" table with quad cane, with min Ax1: 48.95 seconds    Frequency / Duration: Patient to be seen 2 times per week for 12  weeks.     PLAN  [x]  Upgrade activities as tolerated     []  Continue plan of care  [x]  Update interventions per flow sheet       []  Discharge due to:_  []  Other:_        Lisa Franklin, PT, DPT, CIMT 12/29/2022  7:22 AM

## 2023-01-04 ENCOUNTER — APPOINTMENT (OUTPATIENT)
Dept: PHYSICAL THERAPY | Age: 58
End: 2023-01-04

## 2023-01-04 ENCOUNTER — APPOINTMENT (OUTPATIENT)
Dept: PHYSICAL THERAPY | Age: 58
End: 2023-01-04
Payer: MEDICARE

## 2023-01-06 ENCOUNTER — APPOINTMENT (OUTPATIENT)
Dept: PHYSICAL THERAPY | Age: 58
End: 2023-01-06
Payer: MEDICARE

## 2023-01-06 ENCOUNTER — APPOINTMENT (OUTPATIENT)
Dept: PHYSICAL THERAPY | Age: 58
End: 2023-01-06

## 2023-01-09 ENCOUNTER — APPOINTMENT (OUTPATIENT)
Dept: PHYSICAL THERAPY | Age: 58
End: 2023-01-09

## 2023-01-09 ENCOUNTER — APPOINTMENT (OUTPATIENT)
Dept: PHYSICAL THERAPY | Age: 58
End: 2023-01-09
Payer: MEDICARE

## 2023-01-11 ENCOUNTER — APPOINTMENT (OUTPATIENT)
Dept: PHYSICAL THERAPY | Age: 58
End: 2023-01-11

## 2023-01-11 ENCOUNTER — APPOINTMENT (OUTPATIENT)
Dept: PHYSICAL THERAPY | Age: 58
End: 2023-01-11
Payer: MEDICARE

## 2023-01-16 ENCOUNTER — APPOINTMENT (OUTPATIENT)
Dept: PHYSICAL THERAPY | Age: 58
End: 2023-01-16
Payer: MEDICARE

## 2023-01-16 ENCOUNTER — APPOINTMENT (OUTPATIENT)
Dept: PHYSICAL THERAPY | Age: 58
End: 2023-01-16

## 2023-01-17 ENCOUNTER — HOSPITAL ENCOUNTER (OUTPATIENT)
Dept: PHYSICAL THERAPY | Age: 58
Discharge: HOME | End: 2023-01-17
Payer: MEDICARE

## 2023-01-17 PROCEDURE — 97116 GAIT TRAINING THERAPY: CPT

## 2023-01-17 PROCEDURE — 97112 NEUROMUSCULAR REEDUCATION: CPT

## 2023-01-17 PROCEDURE — 97530 THERAPEUTIC ACTIVITIES: CPT

## 2023-01-17 PROCEDURE — 97110 THERAPEUTIC EXERCISES: CPT

## 2023-01-17 NOTE — PROGRESS NOTES
PT DAILY TREATMENT NOTE    Patient Name: No Rey  Date:2023  : 1965  [x]  Patient  Verified  Payor: BLUE CROSS MEDICARE / Plan: Katrina Thapa 8141 HMO / Product Type: Managed Care Medicare /    In time:730  Out time:826  Total Treatment Time (min): 56  Total Timed Codes (min): 56  1:1 Treatment Time (MC/BCBS only): 48   Visit #: 2 of 24    Treatment Dx: Pain in left knee [M25.562]    SUBJECTIVE  Pain Level (0-10 scale): 5  Any medication changes, allergies to medications, adverse drug reactions, diagnosis change, or new procedure performed?: [x] No    [] Yes (see summary sheet for update)  Subjective functional status/changes:   [] No changes reported  \"My hips hurt more than my knee right now. I did fall since last time in the kitchen. \"    OBJECTIVE      16 min Therapeutic Exercise:  [] See flow sheet :   Rationale: increase ROM and increase strength to improve the patients ability to perform daily activities with decreased pain and symptom levels    10 min Therapeutic Activity:  [x]  See flow sheet :   Rationale: increase ROM, increase strength, improve coordination, and increase proprioception  to improve the patients ability to perform daily activities with decreased pain and symptom levels     20 min Neuromuscular Re-education:  [x]  See flow sheet :   Rationale: improve coordination, improve balance, and increase proprioception  to improve the patients ability to perform daily activities with decreased pain and symptom levels      10 min Gait Traininft with SPC on right, CGA to min A for balance, right stepping to improve left stance stability with gait    Rationale: To improve ambulation safety and efficiency in order to improve patient's ability to safely ambulate at home for self care.            With   [] TE   [] TA   [] neuro   [] other: Patient Education: [x] Review HEP    [] Progressed/Changed HEP based on:   [] positioning   [] body mechanics   [] transfers [] heat/ice application    [] other:      Other Objective/Functional Measures:   CGA to min A with transfers  Posterior LOB at times with transfers and standing      Pain Level (0-10 scale) post treatment: 6    ASSESSMENT/Changes in Function: Pt tolerated session well with reporting some increased soreness in left hip post session however no knee pain. Pt challenged with standing activities in parallel bars needing CGA to min A for balance with posterior lean noted. Consistent cues for left LE placement prior to sit to stand transfers. Patient will continue to benefit from skilled PT services to modify and progress therapeutic interventions, address functional mobility deficits, address strength deficits, analyze and cue movement patterns, analyze and modify body mechanics/ergonomics, assess and modify postural abnormalities, address imbalance/dizziness, and instruct in home and community integration to attain remaining goals. [x]  See Plan of Care  []  See progress note/recertification  []  See Discharge Summary         Progress towards goals / Updated goals:  Short Term Goals: To be accomplished in 2 weeks: 1. Patient will report compliance with HEP 1x/day to aid in rehabilitation program.  Status at IE: Provided pt with HEP and pt appeared to understand. Current:compliance per pt report progressing 1/17/22     2. Patient will rate pain on greater than 5/10 so pt can perform ADL's. Status at IE:Pt rates pain _6__/10 max (in the last 48 hrs) __4_/10 min (in the last 48 hrs) _5__/10 currently at rest  Current: Same as IE     Long Term Goals: To be accomplished in 12 weeks: 1. Patient will increase limited Bilateral LE strength by at least . 5 grade MMT throughout so pt can perform transfers easier. Status at IE:    Left (0-5) Right (0-5)   Knee extension 3+ (pain) 4+   Knee flexion 2- 4   Ankle Dorsiflexion (L4) 0 1   Hip flexion 2- 3-   Hip abduction 1 2-   Current: Same as IE     2. Patient will report pain no greater than 4/10 throughout entire day to aid in completion of ADLs. Status at IE:Pt rates pain _6__/10 max (in the last 48 hrs) __4_/10 min (in the last 48 hrs) _5__/10 currently at rest  Current: Same as IE     3. Patient will increase limited knee left ROM by 10 degrees all planes so pt can  objects off the ground. Status at IE:   Knee Left Right   Extension 0 (pain) 0   Flexion 111 (AAROM), pain 130   Current: Same as IE     4. Patient will improve FOTO score to 47 points to demonstrate improvement in functional status. Status at IE:37  Current: Same as IE  *FOTO score is an established functional score where 100 = no disability*     5. Pt will be able to perform sit to stand transfer with SBAx1 and stand pivot transfer with min Ax1 so pt can assist with getting in and out of wheelchair  Status at IE: Stand Pivot to and from w/c and table, mod assistance x1 with quad cane, requiring assistance with left LE to step back to sit; Transfers: Sit to stand transfer from w/c to quad cane CGAx1; sit to stand transfer from table to quad cane with CGAx1, pt requires cuing with placement of left LE     6. Pt will have 5 second improvement in 5x STS test and 3 second improvement in TUG so pt can perform ADLs.   Status at IE: 5x STS: From 20 inch table: 31.31 seconds, no use of the left LE, to quad cane, legs pressing against table; TUG: From 20\" table with quad cane, with min Ax1: 48.95 seconds       PLAN  []  Upgrade activities as tolerated     [x]  Continue plan of care  []  Update interventions per flow sheet       []  Discharge due to:_  []  Other:_      Lyla Calle 1/17/2023  7:24 AM    Future Appointments   Date Time Provider Marty Jj   1/17/2023  7:30 AM Lyla Calle [facility]   1/20/2023  8:00 AM Lyla Calle [facility]   1/24/2023  7:30 AM Lyla Calle [facility]   1/27/2023  7:30 AM Martín Brand PT [facility]

## 2023-01-18 ENCOUNTER — APPOINTMENT (OUTPATIENT)
Dept: PHYSICAL THERAPY | Age: 58
End: 2023-01-18
Payer: MEDICARE

## 2023-01-18 ENCOUNTER — APPOINTMENT (OUTPATIENT)
Dept: PHYSICAL THERAPY | Age: 58
End: 2023-01-18

## 2023-01-20 ENCOUNTER — HOSPITAL ENCOUNTER (OUTPATIENT)
Dept: PHYSICAL THERAPY | Age: 58
Discharge: HOME | End: 2023-01-20
Payer: MEDICARE

## 2023-01-20 PROCEDURE — 97110 THERAPEUTIC EXERCISES: CPT

## 2023-01-20 PROCEDURE — 97530 THERAPEUTIC ACTIVITIES: CPT

## 2023-01-20 PROCEDURE — 97116 GAIT TRAINING THERAPY: CPT

## 2023-01-20 PROCEDURE — 97112 NEUROMUSCULAR REEDUCATION: CPT

## 2023-01-20 NOTE — PROGRESS NOTES
PT DAILY TREATMENT NOTE    Patient Name: Eladio Durant  Date:2023  : 1965  [x]  Patient  Verified  Payor: BLUE CROSS MEDICARE / Plan: Katrina Thapa 8141 HMO / Product Type: Managed Care Medicare /    In time:800 Out time:853  Total Treatment Time (min): 53  Total Timed Codes (min): 53  1:1 Treatment Time (MC/BCBS only): 48   Visit #: 3 of 24    Treatment Dx: Pain in left knee [M25.562]    SUBJECTIVE  Pain Level (0-10 scale): 5  Any medication changes, allergies to medications, adverse drug reactions, diagnosis change, or new procedure performed?: [x] No    [] Yes (see summary sheet for update)  Subjective functional status/changes:   [] No changes reported  \"I was a little sore after last visit for a couple hours but then was good. \" Pt reporting no falls since last visit. OBJECTIVE      15 min Therapeutic Exercise:  [x] See flow sheet :   Rationale: increase ROM and increase strength to improve the patients ability to perform daily activities with decreased pain and symptom levels    8 min Therapeutic Activity:  [x]  See flow sheet : sit to stands   Rationale: increase ROM, increase strength, and improve balance  to improve the patients ability to perform daily activities with decreased pain and symptom levels     15 min Neuromuscular Re-education:  [x]  See flow sheet :   Rationale: increase strength, improve coordination, improve balance, and increase proprioception  to improve the patients ability to perform daily activities with decreased pain and symptom levels    15 min Gait Traininft with SPC with CGA, right step forward, right step taps, lateral stepping in parallel bars    Rationale: To improve ambulation safety and efficiency in order to improve patient's ability to safely ambulate at home  for self care.            With   [] TE   [] TA   [] neuro   [] other: Patient Education: [x] Review HEP    [] Progressed/Changed HEP based on:   [] positioning   [] body mechanics [] transfers   [] heat/ice application    [] other:      Other Objective/Functional Measures:   CGA with gait to parallel bars, improved balance noted   5x STS: 30.8 sec from 20in   CGA to min A with lateral steps to left in parallel bars    Pain Level (0-10 scale) post treatment: 4    ASSESSMENT/Changes in Function: Pt tolerated session well with reporting decreased pain post session. Pt with improved balance and use of left LE today with interventions with ability to tolerate standing progressions as well. Cues still needed to maintain upright trunk posture in standing to decrease posterior lean. Will continue to progress as tolerated. Patient will continue to benefit from skilled PT services to modify and progress therapeutic interventions, address functional mobility deficits, address strength deficits, analyze and cue movement patterns, analyze and modify body mechanics/ergonomics, assess and modify postural abnormalities, address imbalance/dizziness, and instruct in home and community integration to attain remaining goals. [x]  See Plan of Care  []  See progress note/recertification  []  See Discharge Summary         Progress towards goals / Updated goals:  Short Term Goals: 1. Patient will report compliance with HEP 1x/day to aid in rehabilitation program.  Status at IE: Provided pt with HEP and pt appeared to understand. Current:compliance per pt report progressing 1/17/22     2. Patient will rate pain on greater than 5/10 so pt can perform ADL's. Status at IE:Pt rates pain _6__/10 max (in the last 48 hrs) __4_/10 min (in the last 48 hrs) _5__/10 currently at rest  Current: Same as IE     Long Term Goals: To be accomplished in 12 weeks: 1. Patient will increase limited Bilateral LE strength by at least . 5 grade MMT throughout so pt can perform transfers easier.   Status at IE:    Left (0-5) Right (0-5)   Knee extension 3+ (pain) 4+   Knee flexion 2- 4   Ankle Dorsiflexion (L4) 0 1   Hip flexion 2- 3-   Hip abduction 1 2-   Current: Same as IE     2. Patient will report pain no greater than 4/10 throughout entire day to aid in completion of ADLs. Status at IE:Pt rates pain _6__/10 max (in the last 48 hrs) __4_/10 min (in the last 48 hrs) _5__/10 currently at rest  Current: Same as IE     3. Patient will increase limited knee left ROM by 10 degrees all planes so pt can  objects off the ground. Status at IE:   Knee Left Right   Extension 0 (pain) 0   Flexion 111 (AAROM), pain 130   Current: Same as IE     4. Patient will improve FOTO score to 47 points to demonstrate improvement in functional status. Status at IE:37  Current: Same as IE  *FOTO score is an established functional score where 100 = no disability*     5. Pt will be able to perform sit to stand transfer with SBAx1 and stand pivot transfer with min Ax1 so pt can assist with getting in and out of wheelchair  Status at IE: Stand Pivot to and from w/c and table, mod assistance x1 with quad cane, requiring assistance with left LE to step back to sit; Transfers: Sit to stand transfer from w/c to quad cane CGAx1; sit to stand transfer from table to quad cane with CGAx1, pt requires cuing with placement of left LE     6. Pt will have 5 second improvement in 5x STS test and 3 second improvement in TUG so pt can perform ADLs.   Status at IE: 5x STS: From 20 inch table: 31.31 seconds, no use of the left LE, to quad cane, legs pressing against table; TUG: From 20\" table with quad cane, with min Ax1: 48.95 seconds  Current: 5x STS 30.8 sec from 20in with using left LE and decreased push off with LE from table   Progressing 1/20/23    PLAN  []  Upgrade activities as tolerated     [x]  Continue plan of care  []  Update interventions per flow sheet       []  Discharge due to:_  []  Other:_      Maddi Sanchez 1/20/2023  7:50 AM    Future Appointments   Date Time Provider Marty Jj   1/20/2023  8:00 AM Thee Calle [facility]   1/24/2023  7:30 Yris Rushing [facility]   1/27/2023  7:30 [facility]

## 2023-01-23 ENCOUNTER — APPOINTMENT (OUTPATIENT)
Dept: PHYSICAL THERAPY | Age: 58
End: 2023-01-23

## 2023-01-23 ENCOUNTER — APPOINTMENT (OUTPATIENT)
Dept: PHYSICAL THERAPY | Age: 58
End: 2023-01-23
Payer: MEDICARE

## 2023-01-24 ENCOUNTER — APPOINTMENT (OUTPATIENT)
Dept: PHYSICAL THERAPY | Age: 58
End: 2023-01-24
Payer: MEDICARE

## 2023-01-25 ENCOUNTER — APPOINTMENT (OUTPATIENT)
Dept: PHYSICAL THERAPY | Age: 58
End: 2023-01-25

## 2023-01-25 ENCOUNTER — APPOINTMENT (OUTPATIENT)
Dept: PHYSICAL THERAPY | Age: 58
End: 2023-01-25
Payer: MEDICARE

## 2023-01-27 ENCOUNTER — HOSPITAL ENCOUNTER (OUTPATIENT)
Dept: PHYSICAL THERAPY | Age: 58
Discharge: HOME | End: 2023-01-27
Payer: MEDICARE

## 2023-01-27 PROCEDURE — 97110 THERAPEUTIC EXERCISES: CPT

## 2023-01-27 PROCEDURE — 97530 THERAPEUTIC ACTIVITIES: CPT

## 2023-01-27 PROCEDURE — 97112 NEUROMUSCULAR REEDUCATION: CPT

## 2023-01-27 PROCEDURE — 97116 GAIT TRAINING THERAPY: CPT

## 2023-01-27 NOTE — PROGRESS NOTES
PT DAILY TREATMENT NOTE    Patient Name: Ana María Conti  Date:2023  : 1965  [x]  Patient  Verified  Payor: Ariane Rivera / Plan: Katrina Thapa [id number] HMO / Product Type: Managed Care Medicare /    In time:7:32  Out time:8:25  Total Treatment Time (min): 53  Total Timed Codes (min): 53  1:1 Treatment Time (MC/BCBS only): 48   Visit #: 4 of 24    Treatment Dx: Pain in left knee [M25.562]    SUBJECTIVE  Pain Level (0-10 scale): 6  Any medication changes, allergies to medications, adverse drug reactions, diagnosis change, or new procedure performed?: [x] No    [] Yes (see summary sheet for update)  Subjective functional status/changes:   [] No changes reported  Pt reports that she does try to stand at home. Reports she does some walking at home. OBJECTIVE        10 min Therapeutic Exercise:  [] See flow sheet :   Rationale: increase ROM, increase strength, improve coordination, improve balance, and increase proprioception to improve the patients ability to perform daily activities with decreased pain and symptom levels      15 min Therapeutic Activity:  []  See flow sheet :   Rationale: increase ROM, increase strength, improve coordination, improve balance, and increase proprioception  to improve the patients ability to perform daily activities with decreased pain and symptom levels       20 min Neuromuscular Re-education:  []  See flow sheet :   Rationale: increase ROM, increase strength, improve coordination, improve balance, and increase proprioception  to improve the patients ability to perform daily activities with decreased pain and symptom levels      8 min Gait Training:  _1x103__ feet with _quad cane__ device on level surfaces with up to mod assistance level of assistance as pt had a near fall and required therapist assistance. Pt required verbal cuing for placement of device, sequencing, foot placement, feeling for heels   Rationale:  To improve ambulation safety and efficiency in order to improve patient's ability to safely ambulate at home for self care. With   [x] TE   [x] TA   [x] neuro   [] other: Patient Education: [x] Review HEP    [] Progressed/Changed HEP based on:   [x] positioning   [x] body mechanics   [x] transfers   [] heat/ice application    [x] other: educated pt on foot placement with transfers, Educated pt on sit to stand technique including performing PPT and right anterior shift first, digging heels into the ground and to stand engaging hamstrings and glutes. Other Objective/Functional Measures: Pt enters gym in no apparent distress. Movement/gait:  [] Normal              [x] Abnormal: Pt ambulates with quad cane with min to mod assistance x1 with step to, slight step through pattern, with bilateral AFO braces.    Stand Pivot to and from w/c and table, Contact gaurd assistance x1 with quad cane  Bed Mobility: Pt required SBAx1 for sit to supine and supine to sit  Transfers: Sit to stand transfer from w/c to quad cane SBAx1; sit to stand transfer from table to quad cane with SBAx1, pt requires cuing with placement of left LE     Edema: slight swelling noted at the left and right knee     Palpation: TTP:  left vastas lateralis                                                                             Knee Left Right   Extension 0 (pain) 0   Flexion 120 (AAROM), pain 130                 Strength:   [] Unable to assess at this time     Left (0-5) Right (0-5) N/T   Knee extension 4- (pain in lateral thigh) 4+ []    Knee flexion 2- 4 []    Ankle Dorsiflexion (L4) 0 1 []    Hip flexion 2 3 []    Hip abduction 2- 2 []    Bridge: moderate lift  from the table         Balance: Standing balance: 1 minute and 38 seconds without quad cane    5x STS:  From w/c to quad cane, using right UE: 1st time: 39.90 seconds, 2nd: time 24.18 seconds    TUG: From 20\" table with quad cane, with up to min Ax1: 35.70 seconds    Lower trunk rotation (inches): Right: 16  left: 16    Pain Level (0-10 scale) post treatment: 6/10    ASSESSMENT/Changes in Function: Dimitris Olguin is a 62 y.o.  yo female who presents to [facility] diagnosed with arthritis of left knee, closed fracture of tibial plateau with routine healing. Patient is s/p fall approx 1 month ago and per pt MD noticed an old tibial fracture. Per MD order OK foe WBAT with cane, avoid heavy lifting PT, ok for ROM exercises with daily ROM. Pt with a history of Spina bifida, Stroke when she was 7, arthritis, Left RENATO in 2007, revision 2009, and another revision in 2011. This is only patients 4th visit including evaluation on 12/29/22. Pt has progressed with therapy as pt requires less assistance with bed mobility, inc in time with standing balance, dec time with 5x STS test, and dec time with TUG. Although pt has progressed, pt continues to have pain, rating pain 6/10 this therapy session. Pt continues to have tenderness along the left vastas lateralis. Pt also continues to require assistance with bed mobility, transfers, and gait. Pt also with dec in lower trunk rotation. Pt signs and symptoms continue to be consistent with mechanical lumbo-pelvic femoral dysfunction. Pt would benefit from continuing with skilled physical therapy to address these limitations. Patient will continue to benefit from skilled PT services to modify and progress therapeutic interventions, address functional mobility deficits, address ROM deficits, address strength deficits, analyze and address soft tissue restrictions, analyze and cue movement patterns, analyze and modify body mechanics/ergonomics, assess and modify postural abnormalities, address imbalance/dizziness, and instruct in home and community integration to attain remaining goals. []  See Plan of Care  [x]  See progress note/recertification  []  See Discharge Summary         Progress towards goals / Updated goals:  Short Term Goals: 1. Patient will report compliance with HEP 1x/day to aid in rehabilitation program.  Status at IE: Provided pt with HEP and pt appeared to understand. Current:1/27/23 compliance per pt report, needs up dating progressing- goal to be met in 8 more weeks     2. Patient will rate pain on greater than 5/10 so pt can perform ADL's. Status at IE:Pt rates pain _6__/10 max (in the last 48 hrs) __4_/10 min (in the last 48 hrs) _5__/10 currently at rest  Current: 1/27/23 rates pain 6/10 this therapy session-goal to be met in 8 more weeks     Long Term Goals: To be accomplished in 12 weeks: 1. Patient will increase limited Bilateral LE strength by at least . 5 grade MMT throughout so pt can perform transfers easier. Status at IE:    Left (0-5) Right (0-5)   Knee extension 3+ (pain) 4+   Knee flexion 2- 4   Ankle Dorsiflexion (L4) 0 1   Hip flexion 2- 3-   Hip abduction 1 2-   Current: 1/27/23-part'l MET-goal to be met in 8 more weeks      Left (0-5) Right (0-5)   Knee extension 4- (pain in lateral thigh) 4+   Knee flexion 2- 4   Ankle Dorsiflexion (L4) 0 1   Hip flexion 2 3   Hip abduction 2- 2        2. Patient will report pain no greater than 4/10 throughout entire day to aid in completion of ADLs. Status at IE:Pt rates pain _6__/10 max (in the last 48 hrs) __4_/10 min (in the last 48 hrs) _5__/10 currently at rest  Current: 1/27/23 rates pain 6/10 this therapy session-goal to be met in 8 more weeks     3. Patient will increase limited knee left ROM by 10 degrees all planes so pt can  objects off the ground. Status at IE:   Knee Left Right   Extension 0 (pain) 0   Flexion 111 (AAROM), pain 130   Current: 1/27/[id number] to be met in 8 more weeks   Left Right   0 (pain) 0   120 (AAROM), pain 130        4. Patient will improve FOTO score to 47 points to demonstrate improvement in functional status.   Status at IE:37  Current: 1/27/23 Same as IE, will perform on 5th visit, next visit-goal to be met in 8 more weeks  *FOTO score is an established functional score where 100 = no disability*     5. Pt will be able to perform sit to stand transfer with SBAx1 and stand pivot transfer with min Ax1 so pt can assist with getting in and out of wheelchair  Status at IE: Stand Pivot to and from w/c and table, mod assistance x1 with quad cane, requiring assistance with left LE to step back to sit; Transfers: Sit to stand transfer from w/c to quad cane CGAx1; sit to stand transfer from table to quad cane with CGAx1, pt requires cuing with placement of left LE  Current: 1/27/23 Stand Pivot to and from w/c and table, Contact gaurd assistance x1 with quad cane; Transfers: Sit to stand transfer from w/c to quad cane SBAx1; sit to stand transfer from table to quad cane with SBAx1, pt requires cuing with placement of left LE-goal MET     6. Pt will have 5 second improvement in 5x STS test and 3 second improvement in TUG so pt can perform ADLs. Status at IE: 5x STS: From 20 inch table: 31.31 seconds, no use of the left LE, to quad cane, legs pressing against table; TUG: From 20\" table with quad cane, with min Ax1: 48.95 seconds  Current: 1/27/23 5x STS:  From w/c to quad cane, using right UE: 1st time: 39.90 seconds, 2nd: time 24.18 seconds; TUG: From 20\" table with quad cane, with up to min Ax1: 35.70 seconds- goal met    New goals: 1/27/23 to be met in 8 more weeks  1) Pt will have an additional 5 second improvement in 5x STS test and 3 second improvement in TUG so pt can perform ADLs. Status at PN: 1/27/23 5x STS:  From w/c to quad cane, using right UE: 1st time: 39.90 seconds, 2nd: time 24.18 seconds; TUG: From 20\" table with quad cane, with up to min Ax1: 35.70 seconds    2)Pt will be able to ambulate for 1x250 feet with quad cane with SBA to CGA so that pt can ambulate around the home.    Status at PN 1/27/23  Pt ambulated _1x103__ feet with _quad cane__ device on level surfaces with up to mod assistance level of assistance as pt had a near fall and required therapist assistance.  Pt required verbal cuing for placement of device, sequencing, foot placement, feeling for heels    3) Pt will be able to stand without UE support for 2 minutes so that pt can perform ADLs  Status at PN: 1/27/23  Standing balance: 1 minute and 38 seconds without quad cane    PLAN  []  Upgrade activities as tolerated     [x]  Continue plan of care  []  Update interventions per flow sheet       []  Discharge due to:_  [x]  Other:_ Continue with skilled PT 2x/week for 8 more weeks       Krishna Dennis, PT, DPT, [facility] 1/27/2023  7:28 AM    Future Appointments   Date Time Provider Marty Jj   1/27/2023  7:30 AM Yuliana Borja, PT [facility]

## 2023-01-27 NOTE — PROGRESS NOTES
[facility] at the [address], [place] barros, [address] [facility]  Phone: [phone]      Fax:  [phone]    Progress Note      Patient name: Mavis Pickard Start of Care: 2022   Referral source: Abimbola [place] : 1965               Medical Diagnosis: Pain in left knee [M25.562]    Onset Date:1 month ago               Treatment Diagnosis: left knee pain   Prior Hospitalization: see medical history Provider#: [id number]   Medications: Verified on Patient summary List    Co-morbidities: PMHx/Surgical Hx: []DM [] HTN (controlled) [] High cholesterol (controlled) [] Cancer [x]Other  Spina bifida, Stroke when she was 7, Depression, arthritis, Left RENATO in , revision , revision   Prior Level of Function:  functionally required assistance with ADLs, required up to mod/max assistance with ambulation   Social/Recreation/Work: Work Hx: n/a  Living Situation: with                             Visits from Start of Care: 4    Missed Visits: 0    Progress Towards Goals:   Short Term Goals: 1. Patient will report compliance with HEP 1x/day to aid in rehabilitation program.  Status at IE: Provided pt with HEP and pt appeared to understand. Current:23 compliance per pt report, needs up dating progressing- goal to be met in 8 more weeks     2. Patient will rate pain on greater than 5/10 so pt can perform ADL's. Status at IE:Pt rates pain _6__/10 max (in the last 48 hrs) __4_/10 min (in the last 48 hrs) _5__/10 currently at rest  Current: 23 rates pain 6/10 this therapy session-goal to be met in 8 more weeks     Long Term Goals: To be accomplished in 12 weeks: 1. Patient will increase limited Bilateral LE strength by at least . 5 grade MMT throughout so pt can perform transfers easier.   Status at IE:    Left (0-5) Right (0-5)   Knee extension 3+ (pain) 4+   Knee flexion 2- 4   Ankle Dorsiflexion (L4) 0 1   Hip flexion 2- 3-   Hip abduction 1 2- Current: 1/27/23-part'l MET-goal to be met in 8 more weeks      Left (0-5) Right (0-5)   Knee extension 4- (pain in lateral thigh) 4+   Knee flexion 2- 4   Ankle Dorsiflexion (L4) 0 1   Hip flexion 2 3   Hip abduction 2- 2        2. Patient will report pain no greater than 4/10 throughout entire day to aid in completion of ADLs. Status at IE:Pt rates pain _6__/10 max (in the last 48 hrs) __4_/10 min (in the last 48 hrs) _5__/10 currently at rest  Current: 1/27/23 rates pain 6/10 this therapy session-goal to be met in 8 more weeks     3. Patient will increase limited knee left ROM by 10 degrees all planes so pt can  objects off the ground. Status at IE:   Knee Left Right   Extension 0 (pain) 0   Flexion 111 (AAROM), pain 130   Current: 1/27/2350-lwwocgwkzhy-qllk to be met in 8 more weeks   Left Right   0 (pain) 0   120 (AAROM), pain 130        4. Patient will improve FOTO score to 47 points to demonstrate improvement in functional status. Status at IE:37  Current: 1/27/23 Same as IE, will perform on 5th visit, next visit-goal to be met in 8 more weeks  *FOTO score is an established functional score where 100 = no disability*     5. Pt will be able to perform sit to stand transfer with SBAx1 and stand pivot transfer with min Ax1 so pt can assist with getting in and out of wheelchair  Status at IE: Stand Pivot to and from w/c and table, mod assistance x1 with quad cane, requiring assistance with left LE to step back to sit; Transfers: Sit to stand transfer from w/c to quad cane CGAx1; sit to stand transfer from table to quad cane with CGAx1, pt requires cuing with placement of left LE  Current: 1/27/23 Stand Pivot to and from w/c and table, Contact gaurd assistance x1 with quad cane; Transfers: Sit to stand transfer from w/c to quad cane SBAx1; sit to stand transfer from table to quad cane with SBAx1, pt requires cuing with placement of left LE-goal MET     6.  Pt will have 5 second improvement in 5x STS test and 3 second improvement in TUG so pt can perform ADLs. Status at IE: 5x STS: From 20 inch table: 31.31 seconds, no use of the left LE, to quad cane, legs pressing against table; TUG: From 20\" table with quad cane, with min Ax1: 48.95 seconds  Current: 1/27/23 5x STS:  From w/c to quad cane, using right UE: 1st time: 39.90 seconds, 2nd: time 24.18 seconds; TUG: From 20\" table with quad cane, with up to min Ax1: 35.70 seconds- goal met    New goals: 1/27/23 to be met in 8 more weeks  1) Pt will have an additional 5 second improvement in 5x STS test and 3 second improvement in TUG so pt can perform ADLs. Status at PN: 1/27/23 5x STS:  From w/c to quad cane, using right UE: 1st time: 39.90 seconds, 2nd: time 24.18 seconds; TUG: From 20\" table with quad cane, with up to min Ax1: 35.70 seconds    2)Pt will be able to ambulate for 1x250 feet with quad cane with SBA to CGA so that pt can ambulate around the home. Status at PN 1/27/23  Pt ambulated _1x103__ feet with _quad cane__ device on level surfaces with up to mod assistance level of assistance as pt had a near fall and required therapist assistance. Pt required verbal cuing for placement of device, sequencing, foot placement, feeling for heels    3) Pt will be able to stand without UE support for 2 minutes so that pt can perform ADLs  Status at PN: 1/27/23  Standing balance: 1 minute and 38 seconds without quad cane    Key Functional Changes: Mavis Pickard is a 62 y.o.  yo female who [facility] with arthritis of left knee, closed fracture of tibial plateau with routine healing. Patient is s/p fall approx 1 month ago and per pt MD noticed an old tibial fracture. Per MD order OK foe WBAT with cane, avoid heavy lifting PT, ok for ROM exercises with daily ROM. Pt with a history of Spina bifida, Stroke when she was 7, arthritis, Left RENATO in 2007, revision 2009, and another revision in 2011.  This is only patients 4th visit including evaluation on 12/29/22. Pt has progressed with therapy as pt requires less assistance with bed mobility, inc in time with standing balance, dec time with 5x STS test, and dec time with TUG. Although pt has progressed, pt continues to have pain, rating pain 6/10 this therapy session. Pt continues to have tenderness along the left vastas lateralis. Pt also continues to require assistance with bed mobility, transfers, and gait. Pt also with dec in lower trunk rotation. Pt signs and symptoms continue to be consistent with mechanical lumbo-pelvic femoral dysfunction. Pt would benefit from continuing with skilled physical therapy to address these limitations. Patient will continue to benefit from skilled PT services to modify and progress therapeutic interventions, address functional mobility deficits, address ROM deficits, address strength deficits, analyze and address soft tissue restrictions, analyze and cue movement patterns, analyze and modify body mechanics/ergonomics, assess and modify postural abnormalities, address imbalance/dizziness, and instruct in home and community integration to attain remaining goals.     Updated Goals: to be achieved in 8 more weeks:   Please see above  ASSESSMENT/RECOMMENDATIONS:  []Continue therapy per initial plan/protocol at a frequency of  2 x per week for 8 more weeks  []Continue therapy with the following recommended changes:_____________________      _____________________________________________________________________  []Discontinue therapy progressing towards or have reached established goals  []Discontinue therapy due to lack of appreciable progress towards goals  []Discontinue therapy due to lack of attendance or compliance  []Await Physician's recommendations/decisions regarding therapy  []Other:________________________________________________________________    Thank you for this referral.   Samantha Santos, PT, DPT, CIMT 1/27/2023 7:29 AM

## 2023-01-30 ENCOUNTER — APPOINTMENT (OUTPATIENT)
Dept: PHYSICAL THERAPY | Age: 58
End: 2023-01-30

## 2023-01-30 ENCOUNTER — APPOINTMENT (OUTPATIENT)
Dept: PHYSICAL THERAPY | Age: 58
End: 2023-01-30
Payer: MEDICARE

## 2023-02-01 ENCOUNTER — APPOINTMENT (OUTPATIENT)
Dept: PHYSICAL THERAPY | Age: 58
End: 2023-02-01

## 2023-02-01 ENCOUNTER — APPOINTMENT (OUTPATIENT)
Facility: HOSPITAL | Age: 58
End: 2023-02-01
Payer: MEDICARE

## 2023-02-06 ENCOUNTER — APPOINTMENT (OUTPATIENT)
Dept: PHYSICAL THERAPY | Age: 58
End: 2023-02-06

## 2023-02-08 ENCOUNTER — APPOINTMENT (OUTPATIENT)
Dept: PHYSICAL THERAPY | Age: 58
End: 2023-02-08

## 2023-02-08 ENCOUNTER — HOSPITAL ENCOUNTER (OUTPATIENT)
Dept: PHYSICAL THERAPY | Age: 58
Discharge: HOME | End: 2023-02-08
Payer: MEDICARE

## 2023-02-08 PROCEDURE — 97110 THERAPEUTIC EXERCISES: CPT

## 2023-02-08 PROCEDURE — 97112 NEUROMUSCULAR REEDUCATION: CPT

## 2023-02-08 PROCEDURE — 9990 CHARGE CONVERSION

## 2023-02-08 NOTE — PROGRESS NOTES
PT DAILY TREATMENT NOTE     Patient Name: Teo Kulkarni  Date:2023  : 1965  [x]  Patient  Verified  Payor: [employer]IC[employer] / Plan: Katrina Thapa 8141 HMO / Product Type: Managed Care Medicare /    In time:731  Out time:802  Total Treatment Time (min): 31  Visit #: 5 of 27    Medicare/BCBS Only   Total Timed Codes (min):  31 1:1 Treatment Time:  31       Treatment Area: Pain in left knee [M25.562]    SUBJECTIVE  Pain Level (0-10 scale): 6  Any medication changes, allergies to medications, adverse drug reactions, diagnosis change, or new procedure performed?: [x] No    [] Yes (see summary sheet for update)  Subjective functional status/changes:   [] No changes reported  Pt noted increased soreness but no falls since last visit    OBJECTIVE      11 min Therapeutic Exercise:  [] See flow sheet :   Rationale: increase ROM, increase strength, improve coordination, improve balance, and increase proprioception to improve the patients ability to perform daily activities with decreased pain and symptom levels                20 min Neuromuscular Re-education:  []  See flow sheet :   Rationale: increase ROM, increase strength, improve coordination, improve balance, and increase proprioception  to improve the patients ability to perform daily activities with decreased pain and symptom levels             With   [] TE   [] TA   [] neuro   [] other: Patient Education: [x] Review HEP    [] Progressed/Changed HEP based on:   [] positioning   [] body mechanics   [] transfers   [] heat/ice application    [] other:      Other Objective/Functional Measures:   - frequent repositioning and TC/VCs to correct LE placement and sequencing during sit<>stand, pivot transfers and gait today. Improving static balance once assisted in placement of LE under hips.       Pain Level (0-10 scale) post treatment: 6    ASSESSMENT/Changes in Function: pt continues demonstrate decreased safety awareness and poor carryover regarding placement and sequencing of movements B LE, especially transferring and amb. Pt continues to be max A for amb and mod A pivot transfer due to inability to maintain LE safely. Patient will continue to benefit from skilled PT services to modify and progress therapeutic interventions, address functional mobility deficits, address ROM deficits, address strength deficits, analyze and address soft tissue restrictions, and analyze and cue movement patterns to attain remaining goals. [x]  See Plan of Care  []  See progress note/recertification  []  See Discharge Summary         Progress towards goals / Updated goals:     New goals: 1/27/23 to be met in 8 more weeks  1) Pt will have an additional 5 second improvement in 5x STS test and 3 second improvement in TUG so pt can perform ADLs. Status at PN: 1/27/23 5x STS:  From w/c to quad cane, using right UE: 1st time: 39.90 seconds, 2nd: time 24.18 seconds; TUG: From 20\" table with quad cane, with up to min Ax1: 35.70 seconds     2)Pt will be able to ambulate for 1x250 feet with quad cane with SBA to CGA so that pt can ambulate around the home. Status at PN 1/27/23  Pt ambulated _1x103__ feet with _quad cane__ device on level surfaces with up to mod assistance level of assistance as pt had a near fall and required therapist assistance.  Pt required verbal cuing for placement of device, sequencing, foot placement, feeling for heels     3) Pt will be able to stand without UE support for 2 minutes so that pt can perform ADLs  Status at PN: 1/27/23  Standing balance: 1 minute and 38 seconds without quad cane       PLAN  []  Upgrade activities as tolerated     [x]  Continue plan of care  []  Update interventions per flow sheet       []  Discharge due to:_  []  Other:_      Sallye Arms, PTA 2/8/2023  1:59 PM    Future Appointments   Date Time Provider Marty Jj   2/14/2023  7:30 AM Timmy Calle [facility]PTBCLAUDIA [facility]   2/21/2023  7:30 AM Luc Alice RUFFHPELMER [facility]   2/28/2023  7:30 AM Alice Calle THE [facility]

## 2023-02-13 ENCOUNTER — APPOINTMENT (OUTPATIENT)
Dept: PHYSICAL THERAPY | Age: 58
End: 2023-02-13

## 2023-02-14 ENCOUNTER — APPOINTMENT (OUTPATIENT)
Dept: PHYSICAL THERAPY | Age: 58
End: 2023-02-14
Payer: MEDICARE

## 2023-02-14 ENCOUNTER — HOSPITAL ENCOUNTER (OUTPATIENT)
Facility: HOSPITAL | Age: 58
Setting detail: RECURRING SERIES
Discharge: HOME | End: 2023-02-17
Payer: MEDICARE

## 2023-02-14 PROCEDURE — 97110 THERAPEUTIC EXERCISES: CPT

## 2023-02-14 PROCEDURE — 97530 THERAPEUTIC ACTIVITIES: CPT

## 2023-02-14 NOTE — PROGRESS NOTES
PHYSICAL / OCCUPATIONAL THERAPY - DAILY TREATMENT NOTE (updated )    Patient Name: Josee Starks    Date: 2023    : 1965  Insurance: Payor: Mavis Crew / Plan: [facility] Arthuro Godfrey PLUS / Product Type: *No Product type* /      Patient  verified Yes     Visit #   Current / Total 6 27   Time   In / Out [id number]   Pain   In / Out 6 5   Subjective Functional Status/Changes: I did have radiation this weekend for my thyroid but i'm not contagious anymore. My knee is doing good it's more of my hip now. Changes to:  Meds, Allergies, Med Hx, Sx Hx? If yes, update Summary List no       TREATMENT AREA =  No admission diagnoses are documented for this encounter. OBJECTIVE         Therapeutic Procedures: Tx Min Billable or 1:1 Min (if diff from Tx Min) Procedure, Rationale, Specifics   12  31262 Therapeutic Exercise (timed):  increase ROM, strength, coordination, balance, and proprioception to improve patient's ability to progress to PLOF and address remaining functional goals. (see flow sheet as applicable)     Details if applicable:         [address] Therapeutic Activity (timed):  use of dynamic activities replicating functional movements to increase ROM, strength, coordination, balance, and proprioception in order to improve patient's ability to progress to PLOF and address remaining functional goals.   (see flow sheet as applicable)     Details if applicable:            Details if applicable:            Details if applicable:            Details if applicable:     28  [facility] Totals Reminder: bill using total billable min of TIMED therapeutic procedures (example: do not include dry needle or estim unattended, both untimed codes, in totals to left)  8-22 min = 1 unit; 23-37 min = 2 units; 38-52 min = 3 units; 53-67 min = 4 units; 68-82 min = 5 units   Total Total     [x]  Patient Education billed concurrently with other procedures   [x] Review HEP    [] Progressed/Changed HEP, detail: [] Other detail:       Objective Information/Functional Measures/Assessment    Consistent cues to use left LE with STS  Manual assist with left posterior hip shift with right foot stepping forward in parallel bars    Pt tolerated session well with reporting less pain in right hip post session. Pt continues to need consistent cues throughout session to use left Le in standing. CGA to min A with walking with SPC from table to parallel bars. Pt reporting no falls since last session. Patient will continue to benefit from skilled PT / OT services to modify and progress therapeutic interventions, analyze and address functional mobility deficits, analyze and address ROM deficits, analyze and address strength deficits, analyze and cue for proper movement patterns, analyze and modify for postural abnormalities, analyze and address imbalance/dizziness, and instruct in home and community integration to address functional deficits and attain remaining goals. Progress toward goals / Updated goals:  []  See Progress Note/Recertification    Short Term Goals: 1. Patient will report compliance with HEP 1x/day to aid in rehabilitation program.  Status at IE: Provided pt with HEP and pt appeared to understand. Last PN1/27/23 compliance per pt report, needs up dating progressing- goal to be met in 8 more weeks  Current: 2. Patient will rate pain on greater than 5/10 so pt can perform ADL's. Status at IE:Pt rates pain _6__/10 max (in the last 48 hrs) __4_/10 min (in the last 48 hrs) _5__/10 currently at rest  Last PN 1/27/23 rates pain 6/10 this therapy session-goal to be met in 8 more weeks  Current: 6/10 max pain in right hip no change since last PN 2/14/23     Long Term Goals: To be accomplished in 12 weeks: 1. Patient will increase limited Bilateral LE strength by at least . 5 grade MMT throughout so pt can perform transfers easier.   Status at IE:    Left (0-5) Right (0-5)   Knee extension 3+ (pain) 4+   Knee flexion 2- 4   Ankle Dorsiflexion (L4) 0 1   Hip flexion 2- 3-   Hip abduction 1 2-     Last PN1/27/23-part'l MET-goal to be met in 8 more weeks      Left (0-5) Right (0-5)   Knee extension 4- (pain in lateral thigh) 4+   Knee flexion 2- 4   Ankle Dorsiflexion (L4) 0 1   Hip flexion 2 3   Hip abduction 2- 2      Current: NA     2. Patient will report pain no greater than 4/10 throughout entire day to aid in completion of ADLs. Status at IE:Pt rates pain _6__/10 max (in the last 48 hrs) __4_/10 min (in the last 48 hrs) _5__/10 currently at rest  Last PN 1/27/23 rates pain 6/10 this therapy session-goal to be met in 8 more weeks  Current: NA     3. Patient will increase limited knee left ROM by 10 degrees all planes so pt can  objects off the ground. Status at IE:   Knee Left Right   Extension 0 (pain) 0   Flexion 111 (AAROM), pain 130   Last PN 1/27/2325-[id number]-ggtg to be met in 8 more weeks   Left Right   0 (pain) 0   120 (AAROM), pain 130      Current: NA     4. Patient will improve FOTO score to 47 points to demonstrate improvement in functional status. Status at IE:37  Last PN1/27/23 Same as IE, will perform on 5th visit, next visit-goal to be met in 8 more weeks  Current: NA  *FOTO score is an established functional score where 100 = no disability*     5.  Pt will be able to perform sit to stand transfer with SBAx1 and stand pivot transfer with min Ax1 so pt can assist with getting in and out of wheelchair  Status at IE: Stand Pivot to and from w/c and table, mod assistance x1 with quad cane, requiring assistance with left LE to step back to sit; Transfers: Sit to stand transfer from w/c to quad cane CGAx1; sit to stand transfer from table to quad cane with CGAx1, pt requires cuing with placement of left LE  Last PN 1/27/23 Stand Pivot to and from w/c and table, Contact gaurd assistance x1 with quad cane; Transfers: Sit to stand transfer from w/c to quad cane SBAx1; sit to stand transfer from table to quad cane with SBAx1, pt requires cuing with placement of left LE-goal MET     6. Pt will have 5 second improvement in 5x STS test and 3 second improvement in TUG so pt can perform ADLs. Status at IE: 5x STS: From 20 inch table: 31.31 seconds, no use of the left LE, to quad cane, legs pressing against table; TUG: From 20\" table with quad cane, with min Ax1: 48.95 seconds  Last PN 1/27/23 5x STS:  From w/c to quad cane, using right UE: 1st time: 39.90 seconds, 2nd: time 24.18 seconds; TUG: From 20\" table with quad cane, with up to min Ax1: 35.70 seconds- goal met     New goals: 1/27/23 to be met in 8 more weeks  1) Pt will have an additional 5 second improvement in 5x STS test and 3 second improvement in TUG so pt can perform ADLs. Status at PN: 1/27/23 5x STS:  From w/c to quad cane, using right UE: 1st time: 39.90 seconds, 2nd: time 24.18 seconds; TUG: From 20\" table with quad cane, with up to min Ax1: 35.70 seconds  Current: consistent cues needed to use left LE with standing with STS progressing 2/14/23     2)Pt will be able to ambulate for 1x250 feet with quad cane with SBA to CGA so that pt can ambulate around the home. Status at PN 1/27/23  Pt ambulated _1x103__ feet with _quad cane__ device on level surfaces with up to mod assistance level of assistance as pt had a near fall and required therapist assistance.  Pt required verbal cuing for placement of device, sequencing, foot placement, feeling for heels  Current: NA     3) Pt will be able to stand without UE support for 2 minutes so that pt can perform ADLs  Status at PN: 1/27/23  Standing balance: 1 minute and 38 seconds without quad cane  Current: NA       PLAN  Yes  Continue plan of care  []  Upgrade activities as tolerated  []  Discharge due to :  []  Other:    Margy Larkin, PT    2/14/2023    11:14 AM    Future Appointments   Date Time Provider Vaishali Beckett   2/17/2023  7:30 AM [facility] PT OLAYINKA ANTHONYTBW [facility]   2/21/2023  7:30 AM Margy Larkin PT [facility]PTBW [facility]   2/28/2023  7:30 AM Powell Singh, PT [facility]PT[facility]   3/2/2023  7:30 AM Ayaan Record, PT [facility]   3/7/2023  7:30 AM Janelle Singh, PT [facility]   3/9/2023  7:30 AM Marthann Doing, PT [facility]   3/14/2023  7:30 AM Janelle Singh, PT [facility]   3/16/2023  7:30 AM Marthann Doing, PT [facility]   3/21/2023  7:30 AM Janelle Singh, PT [facility]   3/23/2023  7:30 AM Marthann Doing, PT [facility]   3/28/2023  7:30 AM Powell Singh, PT [facility]   3/30/2023  7:30 AM Marthann Doing, PT [facility]

## 2023-02-15 ENCOUNTER — APPOINTMENT (OUTPATIENT)
Dept: PHYSICAL THERAPY | Age: 58
End: 2023-02-15

## 2023-02-17 ENCOUNTER — HOSPITAL ENCOUNTER (OUTPATIENT)
Facility: HOSPITAL | Age: 58
Setting detail: RECURRING SERIES
Discharge: HOME | End: 2023-02-20
Payer: MEDICARE

## 2023-02-17 PROCEDURE — 97110 THERAPEUTIC EXERCISES: CPT

## 2023-02-17 PROCEDURE — 97530 THERAPEUTIC ACTIVITIES: CPT

## 2023-02-17 PROCEDURE — 97112 NEUROMUSCULAR REEDUCATION: CPT

## 2023-02-17 NOTE — PROGRESS NOTES
PHYSICAL / OCCUPATIONAL THERAPY - DAILY TREATMENT NOTE (updated )    Patient Name: Ruslan Newman    Date: 2023    : 1965  Insurance: Payor: Melissa Kamara / Plan: [facility] / Product Type: *No Product type* /      Patient  verified Yes     Visit #   Current / Total 7 27   Time   In / Out 725 835   Pain   In / Out 6 6   Subjective Functional Status/Changes: Pt noted slight increased pain in right lateral hip upon arrival.  Pt noted still getting use to new custom made shoes with sole lifts. Changes to:  Meds, Allergies, Med Hx, Sx Hx? If yes, update Summary List no       TREATMENT AREA =  No admission diagnoses are documented for this encounter. OBJECTIVE    Modalities Rationale:     decrease pain to improve patient's ability to progress to PLOF and address remaining functional goals. min [] Estim Unattended, type/location:                                      []  w/ice    []  w/heat    min [] Estim Attended, type/location:                                     []  w/US     []  w/ice    []  w/heat    []  TENS insruct      min []  Mechanical Traction: type/lbs                   []  pro   []  sup   []  int   []  cont    []  before manual    []  after manual    min []  Ultrasound, settings/location:      min []  Iontophoresis w/ dexamethasone, location:                                               []  take home patch       []  in clinic   10 min  unbilled [x]  Ice     []  Heat    location/position: Seated, right lateral hip    min []  Paraffin,  details:     min []  Vasopneumatic Device, press/temp:     min []  Jacinto Wilburn / Millie Coronel:     If using vaso (only need to measure limb vaso being performed on)      pre-treatment girth :       post-treatment girth :       measured at (landmark location) :      min []  Other:    Skin assessment post-treatment (if applicable):    []  intact    []  redness- no adverse reaction                 []redness - adverse reaction: Therapeutic Procedures: Tx Min Billable or 1:1 Min (if diff from Tx Min) Procedure, Rationale, Specifics   10  46836 Therapeutic Exercise (timed):  increase ROM, strength, coordination, balance, and proprioception to improve patient's ability to progress to PLOF and address remaining functional goals. (see flow sheet as applicable)     Details if applicable:       20  40932 Neuromuscular Re-Education (timed):  improve balance, coordination, kinesthetic sense, posture, core stability and proprioception to improve patient's ability to develop conscious control of individual muscles and awareness of position of extremities in order to progress to PLOF and address remaining functional goals. (see flow sheet as applicable)     Details if applicable:     30  69774 Therapeutic Activity (timed):  use of dynamic activities replicating functional movements to increase ROM, strength, coordination, balance, and proprioception in order to improve patient's ability to progress to PLOF and address remaining functional goals. (see flow sheet as applicable)     Details if applicable:  gait, STS and reassessment                [facility] Totals Reminder: bill using total billable min of TIMED therapeutic procedures (example: do not include dry needle or estim unattended, both untimed codes, in totals to left)  8-22 min = 1 unit; 23-37 min = 2 units; 38-52 min = 3 units; 53-67 min = 4 units; 68-82 min = 5 units   Total Total     [x]  Patient Education billed concurrently with other procedures   [x] Review HEP    [] Progressed/Changed HEP, detail:    [] Other detail:       Objective Information/Functional Measures/Assessment    Pt began treatment with severely adducted LE, right hip shift and lacking knee and hip ext on left (no quad and glute recruitment).   Performed seated and supine postural therex and NDT mm activation on left LE which greatly improved posture, stability and function for remaining reassessments, including gait training, STS and unsupported standing balance. Pt continues to require mod A during gait to control LOB when stopping and starting but able to tolerate 10ft and 60ft with quad can and cues for step width and sequencing. STS from 20\" plinth improved to 27 secs with min A and cues for LE management, and static unsupported balance improved to 1 min hold first attempt and 1 min 54 sec hold second attempt before LE fatigue required seated rest.  Pt given updated HEP focusing on postural and left LE mm activation during functional positions and activities. Pt and her  verbalized understanding of HEP instruction as well as adding wedge between LE to promote hip abd AROM and neutralized posture. Pt had progressed towards all updated goals but none where met at this time. Pt has one additional visit on this recert. /NV scheduled for 2/21/23. Pt will continue to benefit from additional PT therapeutic intervention to increase safety ad ease of functional mobility in home and community for greater QOL. Patient will continue to benefit from skilled PT / OT services to modify and progress therapeutic interventions, analyze and address functional mobility deficits, analyze and address ROM deficits, analyze and address strength deficits, analyze and address soft tissue restrictions, and analyze and cue for proper movement patterns to address functional deficits and attain remaining goals. Progress toward goals / Updated goals:  []  See Progress Note/Recertification    Short Term Goals: 1. Patient will report compliance with HEP 1x/day to aid in rehabilitation program.  Status at IE: Provided pt with HEP and pt appeared to understand. Last PN1/27/23 compliance per pt report, needs up dating progressing- goal to be met in 8 more weeks  Current: 2. Patient will rate pain on greater than 5/10 so pt can perform ADL's.   Status at IE:Pt rates pain _6__/10 max (in the last 48 hrs) __4_/10 min (in the last 48 hrs) _5__/10 currently at rest  Last PN 1/27/23 rates pain 6/10 this therapy session-goal to be met in 8 more weeks  Current: 6/10 max pain in right hip no change since last PN 2/14/23     Long Term Goals: To be accomplished in 12 weeks: 1. Patient will increase limited Bilateral LE strength by at least . 5 grade MMT throughout so pt can perform transfers easier. Status at IE:    Left (0-5) Right (0-5)   Knee extension 3+ (pain) 4+   Knee flexion 2- 4   Ankle Dorsiflexion (L4) 0 1   Hip flexion 2- 3-   Hip abduction 1 2-      Last PN1/27/23-part'l MET-goal to be met in 8 more weeks      Left (0-5) Right (0-5)   Knee extension 4- (pain in lateral thigh) 4+   Knee flexion 2- 4   Ankle Dorsiflexion (L4) 0 1   Hip flexion 2 3   Hip abduction 2- 2      Current: NA     2. Patient will report pain no greater than 4/10 throughout entire day to aid in completion of ADLs. Status at IE:Pt rates pain _6__/10 max (in the last 48 hrs) __4_/10 min (in the last 48 hrs) _5__/10 currently at rest  Last PN 1/27/23 rates pain 6/10 this therapy session-goal to be met in 8 more weeks  Current: NA     3. Patient will increase limited knee left ROM by 10 degrees all planes so pt can  objects off the ground. Status at IE:   Knee Left Right   Extension 0 (pain) 0   Flexion 111 (AAROM), pain 130   Last PN 1/27/2333-iymjbvdzluh-egxt to be met in 8 more weeks   Left Right   0 (pain) 0   120 (AAROM), pain 130      Current: NA     4. Patient will improve FOTO score to 47 points to demonstrate improvement in functional status. Status at IE:37  Last PN1/27/23 Same as IE, will perform on 5th visit, next visit-goal to be met in 8 more weeks  Current: NA  *FOTO score is an established functional score where 100 = no disability*     5.  Pt will be able to perform sit to stand transfer with SBAx1 and stand pivot transfer with min Ax1 so pt can assist with getting in and out of wheelchair  Status at IE: Stand Pivot to and from w/c and table, mod assistance x1 with quad cane, requiring assistance with left LE to step back to sit; Transfers: Sit to stand transfer from w/c to quad cane CGAx1; sit to stand transfer from table to quad cane with CGAx1, pt requires cuing with placement of left LE  Last PN 1/27/23 Stand Pivot to and from w/c and table, Contact gaurd assistance x1 with quad cane; Transfers: Sit to stand transfer from w/c to quad cane SBAx1; sit to stand transfer from table to quad cane with SBAx1, pt requires cuing with placement of left LE-goal MET     6. Pt will have 5 second improvement in 5x STS test and 3 second improvement in TUG so pt can perform ADLs. Status at IE: 5x STS: From 20 inch table: 31.31 seconds, no use of the left LE, to quad cane, legs pressing against table; TUG: From 20\" table with quad cane, with min Ax1: 48.95 seconds  Last PN 1/27/23 5x STS:  From w/c to quad cane, using right UE: 1st time: 39.90 seconds, 2nd: time 24.18 seconds; TUG: From 20\" table with quad cane, with up to min Ax1: 35.70 seconds- goal met         New goals: 1/27/23 to be met in 8 more weeks (NV 2/64/71 recert required)  1) Pt will have an additional 5 second improvement in 5x STS test and 3 second improvement in TUG so pt can perform ADLs. Status at PN: 1/27/23 5x STS:  From w/c to quad cane, using right UE: 1st time: 39.90 seconds, 2nd: time 24.18 seconds; TUG: From 20\" table with quad cane, with up to min Ax1: 35.70 seconds  Current: consistent cues needed to use left LE with standing with STS progressing 27 secs ( 10 sec improvement but continued assistance to steady balance in standing) 5x STS at 20\" table, min A. 2/17/23     2)Pt will be able to ambulate for 1x250 feet with quad cane with SBA to CGA so that pt can ambulate around the home. Status at PN 1/27/23  Pt ambulated _1x103__ feet with _quad cane__ device on level surfaces with up to mod assistance level of assistance as pt had a near fall and required therapist assistance.  Pt required verbal cuing for placement of device, sequencing, foot placement, feeling for heels  Current: progressing, 3 trials with quad cane, mod A and cues for LE management, distances of 10ft and 60ft (2/17/23)     3) Pt will be able to stand without UE support for 2 minutes so that pt can perform ADLs  Status at PN: 1/27/23  Standing balance: 1 minute and 38 seconds without quad cane  Current: progressing attempt #1 1min, #2 1min 54secs (2/17/23)          PLAN  Yes  Continue plan of care  []  Upgrade activities as tolerated  []  Discharge due to :  [x]  Other: recert CHRISTI Aleman, PTA    2/17/2023    7:25 AM    Future Appointments   Date Time Provider Viashali Beckett   2/17/2023  7:30 AM [facility]   2/21/2023  7:30 AM Diego Liriano, PT [facility]   2/28/2023  7:30 AM Katy Nava, PT [facility]   3/2/2023  7:30 AM Yamile Zamora, PT [facility]   3/7/2023  7:30 AM Katy Nava, PT [facility]   3/9/2023  7:30 AM Heather Lester, PT [facility]   3/14/2023  7:30 AM Katy Nava, PT [facility]   3/16/2023  7:30 AM Francoisess Said, PT [facility]   3/21/2023  7:30 AM Katy Nava, PT [facility]   3/23/2023  7:30 AM Francoisess Said, PT [facility]   3/28/2023  7:30 AM Katy Nava, PT [facility]   3/30/2023  7:30 AM Francoisess Said, PT [facility]

## 2023-02-20 ENCOUNTER — APPOINTMENT (OUTPATIENT)
Dept: PHYSICAL THERAPY | Age: 58
End: 2023-02-20

## 2023-02-21 ENCOUNTER — APPOINTMENT (OUTPATIENT)
Dept: PHYSICAL THERAPY | Age: 58
End: 2023-02-21
Payer: MEDICARE

## 2023-02-21 ENCOUNTER — HOSPITAL ENCOUNTER (OUTPATIENT)
Facility: HOSPITAL | Age: 58
Setting detail: RECURRING SERIES
Discharge: HOME | End: 2023-02-24
Payer: MEDICARE

## 2023-02-21 PROCEDURE — 97110 THERAPEUTIC EXERCISES: CPT

## 2023-02-21 PROCEDURE — 97530 THERAPEUTIC ACTIVITIES: CPT

## 2023-02-21 NOTE — PROGRESS NOTES
PHYSICAL / OCCUPATIONAL THERAPY - DAILY TREATMENT NOTE (updated )    Patient Name: Roxanne Carmichael    Date: 2023    : 1965  Insurance: Payor: Triston Craven / Plan: FRANCO [facility] Felicia Potash PLUS / Product Type: *No Product type* /      Patient  verified Yes     Visit #   Current / Total 8 27   Time   In / Out 732 805   Pain   In / Out 7 7   Subjective Functional Status/Changes: I had 2 falls since last visit. Both were when I was walking, one in the bathroom and one in the kitchen. I lost my balance and it happened so fast. My  helped me the first time off the floor but we had to call 911 the second time. I am fine though no bruises or increase in pain. My right hip is the one that bothers me most. Left knee is doing great   Changes to:  Meds, Allergies, Med Hx, Sx Hx? If yes, update Summary List no       TREATMENT AREA =  No admission diagnoses are documented for this encounter. OBJECTIVE         Therapeutic Procedures: Tx Min Billable or 1:1 Min (if diff from Tx Min) Procedure, Rationale, Specifics   13  14445 Therapeutic Exercise (timed):  increase ROM, strength, coordination, balance, and proprioception to improve patient's ability to progress to PLOF and address remaining functional goals. (see flow sheet as applicable)     Details if applicable:       20  30245 Therapeutic Activity (timed):  use of dynamic activities replicating functional movements to increase ROM, strength, coordination, balance, and proprioception in order to improve patient's ability to progress to PLOF and address remaining functional goals.   (see flow sheet as applicable)     Details if applicable:            Details if applicable:            Details if applicable:            Details if applicable:     35  [facility] Totals Reminder: bill using total billable min of TIMED therapeutic procedures (example: do not include dry needle or estim unattended, both untimed codes, in totals to left)  8-22 min = 1 unit; 23-37 min = 2 units; 38-52 min = 3 units; 53-67 min = 4 units; 68-82 min = 5 units   Total Total     [x]  Patient Education billed concurrently with other procedures   [x] Review HEP    [] Progressed/Changed HEP, detail:    [] Other detail:       Objective Information/Functional Measures/Assessment  See updated goals below           Pt presented to therapy with c/c left knee and hip pain with diagnosis or left closed tibial fx. Pt with a history of Spina bifida, Stroke when she was 7, arthritis, Left ROSEANNE in 2007, revision 2009, and another revision in 2011. Pt has attended 8 sessions including initial eval with making good progress towards goals with now demonstrating [facility] left knee ROM without pain, no longer c/o pain in left knee,  improving LE strength per MMT and less time needed for 5x STS and TUG. Pt however still c/o right lateral hip pain, poor safety awareness with transfers at times with consistent cueing needed for left LE placement when standing up and awareness of left LE with turns, mod A at times needed with gait with SPC due to balance,  and still falling at home due to LOB. Pt would benefit from continued skilled PT services to address remaining unmet goals and above deficits to allow pt to complete ADLs with increased ease and less pain as well as decrease fall risk. Patient will continue to benefit from skilled PT / OT services to modify and progress therapeutic interventions, analyze and address functional mobility deficits, analyze and address strength deficits, analyze and cue for proper movement patterns, analyze and modify for postural abnormalities, analyze and address imbalance/dizziness, and instruct in home and community integration to address functional deficits and attain remaining goals. Progress toward goals / Updated goals:  []  See Progress Note/Recertification    Short Term Goals: 1. Patient will report compliance with HEP 1x/day to aid in rehabilitation program.  Status at IE: Provided pt with HEP and pt appeared to understand. Last PN1/27/23 compliance per pt report, needs up dating progressing- goal to be met in 8 more weeks  Current: compliance per pt report progressing well 2/21/23     2. Patient will rate pain on greater than 5/10 so pt can perform ADL's. Status at IE:Pt rates pain _6__/10 max (in the last 48 hrs) __4_/10 min (in the last 48 hrs) _5__/10 currently at rest  Last PN 1/27/23 rates pain 6/10 this therapy session-goal to be met in 8 more weeks  Current: 7/10 max pain in right hip, no longer having pain in left knee progressing 2/21/23     Long Term Goals: To be accomplished in 12 weeks: 1. Patient will increase limited Bilateral LE strength by at least . 5 grade MMT throughout so pt can perform transfers easier. Status at IE:    Left (0-5) Right (0-5)   Knee extension 3+ (pain) 4+   Knee flexion 2- 4   Ankle Dorsiflexion (L4) 0 1   Hip flexion 2- 3-   Hip abduction 1 2-      Last PN1/27/23-part'l MET-goal to be met in 8 more weeks      Left (0-5) Right (0-5)   Knee extension 4- (pain in lateral thigh) 4+   Knee flexion 2- 4   Ankle Dorsiflexion (L4) 0 1   Hip flexion 2 3   Hip abduction 2- 2      Current: progressing 2/21/23    Left (0-5) Right (0-5)   Knee extension 4+ 4+   Knee flexion 3 4+   Ankle Dorsiflexion (L4) 0 1   Hip flexion 2 3   Hip abduction 2 2+        2. Patient will report pain no greater than 4/10 throughout entire day to aid in completion of ADLs. Status at IE:Pt rates pain _6__/10 max (in the last 48 hrs) __4_/10 min (in the last 48 hrs) _5__/10 currently at rest  Last PN 1/27/23 rates pain 6/10 this therapy session-goal to be met in 8 more weeks  Current: 7/10 max pain in right hip, no longer having pain in left knee progressing 2/21/23     3. Patient will increase limited knee left ROM by 10 degrees all planes so pt can  objects off the ground.   Status at IE:   Knee Left Right   Extension 0 (pain) 0   Flexion 111 (AAROM), pain 130   Last PN 1/27/2382-twcpwscjari-lyak to be met in 8 more weeks   Left Right   0 (pain) 0   120 (AAROM), pain 130      Current: goal MET 2/21/23  Knee Left Right   Extension 0 0   Flexion 125 AA no pain 130        4. Patient will improve FOTO score to 47 points to demonstrate improvement in functional status. Status at IE:37  Last PN1/27/23 Same as IE, will perform on 5th visit, next visit-goal to be met in 8 more weeks  Current: 27 regression 2/21/23  *FOTO score is an established functional score where 100 = no disability*     5. Pt will be able to perform sit to stand transfer with SBAx1 and stand pivot transfer with min Ax1 so pt can assist with getting in and out of wheelchair  Status at IE: Stand Pivot to and from w/c and table, mod assistance x1 with quad cane, requiring assistance with left LE to step back to sit; Transfers: Sit to stand transfer from w/c to quad cane CGAx1; sit to stand transfer from table to quad cane with CGAx1, pt requires cuing with placement of left LE  Last PN 1/27/23 Stand Pivot to and from w/c and table, Contact gaurd assistance x1 with quad cane; Transfers: Sit to stand transfer from w/c to quad cane SBAx1; sit to stand transfer from table to quad cane with SBAx1, pt requires cuing with placement of left LE-goal MET     6. Pt will have 5 second improvement in 5x STS test and 3 second improvement in TUG so pt can perform ADLs.   Status at IE: 5x STS: From 20 inch table: 31.31 seconds, no use of the left LE, to quad cane, legs pressing against table; TUG: From 20\" table with quad cane, with min Ax1: 48.95 seconds  Last PN 1/27/23 5x STS:  From w/c to quad cane, using right UE: 1st time: 39.90 seconds, 2nd: time 24.18 seconds; TUG: From 20\" table with quad cane, with up to min Ax1: 35.70 seconds- goal met        New goals: 1/27/23 to be met in 8 more weeks (NV 6/50/77 recert required)  1) Pt will have an additional 5 second improvement in 5x STS test and 3 second improvement in TUG so pt can perform ADLs. Status at PN: 1/27/23 5x STS:  From w/c to quad cane, using right UE: 1st time: 39.90 seconds, 2nd: time 24.18 seconds; TUG: From 20\" table with quad cane, with up to min Ax1: 35.70 seconds  Current: consistent cues needed to use left LE with standing with STS progressing 27 secs ( 10 sec improvement but continued assistance to steady balance in standing) 5x STS at 20\" table, min A. 2/17/23     2)Pt will be able to ambulate for 1x250 feet with quad cane with SBA to CGA so that pt can ambulate around the home. Status at PN 1/27/23  Pt ambulated _1x103__ feet with _quad cane__ device on level surfaces with up to mod assistance level of assistance as pt had a near fall and required therapist assistance.  Pt required verbal cuing for placement of device, sequencing, foot placement, feeling for heels  Current: progressing, 3 trials with quad cane, mod A and cues for LE management, distances of 10ft and 60ft (2/17/23)     3) Pt will be able to stand without UE support for 2 minutes so that pt can perform ADLs  Status at PN: 1/27/23  Standing balance: 1 minute and 38 seconds without quad cane  Current: progressing attempt #1 1min, #2 1min 54secs (2/17/23)    PLAN  Yes  Continue plan of care  []  Upgrade activities as tolerated  []  Discharge due to :  []  Other:    Vanna Killian PT    2/21/2023    7:29 AM    Future Appointments   Date Time Provider Vaishali Beckett   2/21/2023  7:30 AM Vanna Killian, PT [facility]   2/28/2023  7:30 AM Vanna Killian PT [facility]   3/2/2023  7:30 AM PHILLIP Johnson [facility]   3/7/2023  7:30 AM Vanna Killian, PT [facility]   3/9/2023  7:30 AM Abdias Neal PT [facility]   3/14/2023  7:30 AM Rand Angles, PT [facility]   3/16/2023  7:30 AM Virlinda Neth, PT [facility]   3/21/2023  7:30 AM Rand Angles, PT [facility]   3/23/2023  7:30 AM Virlinda Neth, PT [facility]   3/28/2023  7:30 AM Rand Angles, PT [facility]   3/30/2023 7:30 AM Magy Gray, PHILLIP [facility]

## 2023-02-21 NOTE — PROGRESS NOTES
[facility] at the [address], [place] Vaishali dominguez, [address] [place] [facility]  Phone: [phone]      Fax:  [phone]    Progress Note    Patient name: Aletha Beck Start of Care: 22   Referral source: Dameon Berrios, Olga Platt : 1965   Medical/Treatment Diagnosis: left knee pain  M 25.562 Onset Date:1 month ago     Prior Hospitalization: see medical history Provider#: [id number]   Medications: Verified on Patient Summary List    Comorbidities: spina bifida, stroke, depression, arthritis, left ROSEANNE in , revision  and   Prior Level of Function:functionally required assistance with ADLs, required mod/max A with ambulation     Visits from Start of Care: 8    Missed Visits: 0    Updated Goals/Measure of Progress: To be achieved in 8 weeks:    Short Term Goals: 1. Patient will report compliance with HEP 1x/day to aid in rehabilitation program.  Status at IE: Provided pt with HEP and pt appeared to understand. Last PN23 compliance per pt report, needs up dating progressing- goal to be met in 8 more weeks  Current: compliance per pt report progressing well 23     2. Patient will rate pain on greater than 5/10 so pt can perform ADL's. Status at IE:Pt rates pain _6__/10 max (in the last 48 hrs) __4_/10 min (in the last 48 hrs) _5__/10 currently at rest  Last PN 23 rates pain 6/10 this therapy session-goal to be met in 8 more weeks  Current: 7/10 max pain in right hip, no longer having pain in left knee progressing 23     Long Term Goals: To be accomplished in 12 weeks: 1. Patient will increase limited Bilateral LE strength by at least . 5 grade MMT throughout so pt can perform transfers easier.   Status at IE:    Left (0-5) Right (0-5)   Knee extension 3+ (pain) 4+   Knee flexion 2- 4   Ankle Dorsiflexion (L4) 0 1   Hip flexion 2- 3-   Hip abduction 1 2-      Last PN23-part'l MET-goal to be met in 8 more weeks      Left (0-5) Right (0-5)   Knee extension 4- (pain in lateral thigh) 4+   Knee flexion 2- 4   Ankle Dorsiflexion (L4) 0 1   Hip flexion 2 3   Hip abduction 2- 2      Current: progressing 2/21/23    Left (0-5) Right (0-5)   Knee extension 4+ 4+   Knee flexion 3 4+   Ankle Dorsiflexion (L4) 0 1   Hip flexion 2 3   Hip abduction 2 2+         2. Patient will report pain no greater than 4/10 throughout entire day to aid in completion of ADLs. Status at IE:Pt rates pain _6__/10 max (in the last 48 hrs) __4_/10 min (in the last 48 hrs) _5__/10 currently at rest  Last PN 1/27/23 rates pain 6/10 this therapy session-goal to be met in 8 more weeks  Current: 7/10 max pain in right hip, no longer having pain in left knee progressing 2/21/23     3. Patient will increase limited knee left ROM by 10 degrees all planes so pt can  objects off the ground. Status at IE:   Knee Left Right   Extension 0 (pain) 0   Flexion 111 (AAROM), pain 130   Last PN 1/27/2359-[id number]-xomq to be met in 8 more weeks   Left Right   0 (pain) 0   120 (AAROM), pain 130      Current: goal MET 2/21/23  Knee Left Right   Extension 0 0   Flexion 125 AA no pain 130         4. Patient will improve FOTO score to 47 points to demonstrate improvement in functional status. Status at IE:37  Last PN1/27/23 Same as IE, will perform on 5th visit, next visit-goal to be met in 8 more weeks  Current: 27 regression 2/21/23  *FOTO score is an established functional score where 100 = no disability*     5.  Pt will be able to perform sit to stand transfer with SBAx1 and stand pivot transfer with min Ax1 so pt can assist with getting in and out of wheelchair  Status at IE: Stand Pivot to and from w/c and table, mod assistance x1 with quad cane, requiring assistance with left LE to step back to sit; Transfers: Sit to stand transfer from w/c to quad cane CGAx1; sit to stand transfer from table to quad cane with CGAx1, pt requires cuing with placement of left LE  Last PN 1/27/23 Stand Pivot to and from w/c and table, Contact gaurd assistance x1 with quad cane; Transfers: Sit to stand transfer from w/c to quad cane SBAx1; sit to stand transfer from table to quad cane with SBAx1, pt requires cuing with placement of left LE-goal MET     6. Pt will have 5 second improvement in 5x STS test and 3 second improvement in TUG so pt can perform ADLs. Status at IE: 5x STS: From 20 inch table: 31.31 seconds, no use of the left LE, to quad cane, legs pressing against table; TUG: From 20\" table with quad cane, with min Ax1: 48.95 seconds  Last PN 1/27/23 5x STS:  From w/c to quad cane, using right UE: 1st time: 39.90 seconds, 2nd: time 24.18 seconds; TUG: From 20\" table with quad cane, with up to min Ax1: 35.70 seconds- goal met     New goals: 1/27/23 to be met in 8 more weeks   1) Pt will have an additional 5 second improvement in 5x STS test and 3 second improvement in TUG so pt can perform ADLs. Status at PN: 1/27/23 5x STS:  From w/c to quad cane, using right UE: 1st time: 39.90 seconds, 2nd: time 24.18 seconds; TUG: From 20\" table with quad cane, with up to min Ax1: 35.70 seconds  Current: consistent cues needed to use left LE with standing with STS progressing 27 secs ( 10 sec improvement but continued assistance to steady balance in standing) 5x STS at 20\" table, min A. 2/17/23     2)Pt will be able to ambulate for 1x250 feet with quad cane with SBA to CGA so that pt can ambulate around the home. Status at PN 1/27/23  Pt ambulated _1x103__ feet with _quad cane__ device on level surfaces with up to mod assistance level of assistance as pt had a near fall and required therapist assistance.  Pt required verbal cuing for placement of device, sequencing, foot placement, feeling for heels  Current: progressing, 3 trials with quad cane, mod A and cues for LE management, distances of 10ft and 60ft (2/17/23)     3) Pt will be able to stand without UE support for 2 minutes so that pt can perform ADLs  Status at PN: 1/27/23 Standing balance: 1 minute and 38 seconds without quad cane  Current: progressing attempt #1 1min, #2 1min 54secs (2/17/23)      Summary of Care/ Key Functional Changes: Pt presented to therapy with c/c left knee and hip pain with diagnosis or left closed tibial fx. Pt with a history of Spina bifida, Stroke when she was 7, arthritis, Left ROSEANNE in 2007, revision 2009, and another revision in 2011. Pt has attended 8 sessions including initial eval with making good progress towards goals with now demonstrating [facility] left knee ROM without pain, no longer c/o pain in left knee,  improving LE strength per MMT and less time needed for 5x STS and TUG. Pt however still c/o right lateral hip pain, poor safety awareness with transfers at times with consistent cueing needed for left LE placement when standing up and awareness of left LE with turns, mod A at times needed with gait with SPC due to balance,  and still falling at home due to LOB. Pt would benefit from continued skilled PT services to address remaining unmet goals and above deficits to allow pt to complete ADLs with increased ease and less pain as well as decrease fall risk.           ASSESSMENT/RECOMMENDATIONS:    [x]Continue therapy per initial plan/protocol at a frequency of  2 x per week for 8 weeks  []Continue therapy with the following recommended changes:_____________________      _____________________________________________________________________  []Discontinue therapy progressing towards or have reached established goals  []Discontinue therapy due to lack of appreciable progress towards goals  []Discontinue therapy due to lack of attendance or compliance  []Await Physician's recommendations/decisions regarding therapy  []Other:________________________________________________________________    Thank you for this referral.   Donna Ling, PT 2/21/2023 1:10 PM

## 2023-02-22 ENCOUNTER — APPOINTMENT (OUTPATIENT)
Dept: PHYSICAL THERAPY | Age: 58
End: 2023-02-22

## 2023-02-24 ENCOUNTER — APPOINTMENT (OUTPATIENT)
Dept: PHYSICAL THERAPY | Age: 58
End: 2023-02-24
Payer: MEDICARE

## 2023-02-24 ENCOUNTER — APPOINTMENT (OUTPATIENT)
Facility: HOSPITAL | Age: 58
End: 2023-02-24
Payer: MEDICARE

## 2023-02-27 ENCOUNTER — APPOINTMENT (OUTPATIENT)
Dept: PHYSICAL THERAPY | Age: 58
End: 2023-02-27

## 2023-02-28 ENCOUNTER — APPOINTMENT (OUTPATIENT)
Dept: PHYSICAL THERAPY | Age: 58
End: 2023-02-28
Payer: MEDICARE

## 2023-02-28 ENCOUNTER — HOSPITAL ENCOUNTER (OUTPATIENT)
Facility: HOSPITAL | Age: 58
Setting detail: RECURRING SERIES
End: 2023-02-28
Payer: MEDICARE

## 2023-03-01 ENCOUNTER — APPOINTMENT (OUTPATIENT)
Dept: PHYSICAL THERAPY | Age: 58
End: 2023-03-01
Payer: MEDICARE

## 2023-03-01 ENCOUNTER — APPOINTMENT (OUTPATIENT)
Dept: PHYSICAL THERAPY | Age: 58
End: 2023-03-01

## 2023-03-02 ENCOUNTER — APPOINTMENT (OUTPATIENT)
Facility: HOSPITAL | Age: 58
End: 2023-03-02
Payer: MEDICARE

## 2023-03-07 ENCOUNTER — HOSPITAL ENCOUNTER (OUTPATIENT)
Facility: HOSPITAL | Age: 58
Setting detail: RECURRING SERIES
Discharge: HOME | End: 2023-03-10
Payer: MEDICARE

## 2023-03-07 PROCEDURE — 97530 THERAPEUTIC ACTIVITIES: CPT

## 2023-03-07 PROCEDURE — 97110 THERAPEUTIC EXERCISES: CPT

## 2023-03-07 NOTE — PROGRESS NOTES
PHYSICAL / OCCUPATIONAL THERAPY - DAILY TREATMENT NOTE (updated )    Patient Name: Sandy Vaughn    Date: 3/7/2023    : 1965  Insurance: Payor: Mary Nunez / Plan: [employer]AN[facility] PLUS / Product Type: *No Product type* /      Patient  verified Yes     Visit #   Current / Total 9 27   Time   In / Out 730 805   Pain   In / Out 6 4   Subjective Functional Status/Changes: Good. Getting over a bug. I did have a fall forward when trying to flush the toilet. Changes to:  Meds, Allergies, Med Hx, Sx Hx? If yes, update Summary List no       TREATMENT AREA = left knee pain     OBJECTIVE    Therapeutic Procedures: Tx Min Billable or 1:1 Min (if diff from Tx Min) Procedure, Rationale, Specifics   10  42850 Therapeutic Exercise (timed):  increase ROM, strength, coordination, balance, and proprioception to improve patient's ability to progress to PLOF and address remaining functional goals. (see flow sheet as applicable)     Details if applicable:         08081 Therapeutic Activity (timed):  use of dynamic activities replicating functional movements to increase ROM, strength, coordination, balance, and proprioception in order to improve patient's ability to progress to PLOF and address remaining functional goals.   (see flow sheet as applicable)     Details if applicable:            Details if applicable:            Details if applicable:            Details if applicable:     28  [facility] Totals Reminder: bill using total billable min of TIMED therapeutic procedures (example: do not include dry needle or estim unattended, both untimed codes, in totals to left)  8-22 min = 1 unit; 23-37 min = 2 units; 38-52 min = 3 units; 53-67 min = 4 units; 68-82 min = 5 units   Total Total     TOTAL TREATMENT TIME:        35     [x]  Patient Education billed concurrently with other procedures   [x] Review HEP    [] Progressed/Changed HEP, detail:    [] Other detail:       Objective Information/Functional Measures/Assessment  29.5sec 5x STS from 18in   1min standing without UE assist      Pt tolerated session well with reporting decreased pain in right hip post session. Improved weight shifting to left today in stance and STS with ability to stand for 1 min without right UE assist.     Patient will continue to benefit from skilled PT / OT services to modify and progress therapeutic interventions, analyze and address functional mobility deficits, analyze and address strength deficits, analyze and cue for proper movement patterns, analyze and modify for postural abnormalities, analyze and address imbalance/dizziness, and instruct in home and community integration to address functional deficits and attain remaining goals. Progress toward goals / Updated goals:  []  See Progress Note/Recertification    Short Term Goals: 1. Patient will report compliance with HEP 1x/day to aid in rehabilitation program.  Status at IE: Provided pt with HEP and pt appeared to understand. Last PN : compliance per pt report progressing well 2/21/23  Current: NA     2. Patient will rate pain on greater than 5/10 so pt can perform ADL's. Status at IE:Pt rates pain _6__/10 max (in the last 48 hrs) __4_/10 min (in the last 48 hrs) _5__/10 currently at rest  Last PN7/10 max pain in right hip, no longer having pain in left knee progressing 2/21/23  Current: NA     Long Term Goals: To be accomplished in 12 weeks: 1. Patient will increase limited Bilateral LE strength by at least . 5 grade MMT throughout so pt can perform transfers easier. Status at IE:    Left (0-5) Right (0-5)   Knee extension 3+ (pain) 4+   Knee flexion 2- 4   Ankle Dorsiflexion (L4) 0 1   Hip flexion 2- 3-   Hip abduction 1 2-      Last PN progressing 2/21/23    Left (0-5) Right (0-5)   Knee extension 4+ 4+   Knee flexion 3 4+   Ankle Dorsiflexion (L4) 0 1   Hip flexion 2 3   Hip abduction 2 2+      Current: NA     2. Patient will report pain no greater than 4/10 throughout entire day to aid in completion of ADLs. Status at IE:Pt rates pain _6__/10 max (in the last 48 hrs) __4_/10 min (in the last 48 hrs) _5__/10 currently at rest  Last PN : 7/10 max pain in right hip, no longer having pain in left knee progressing 2/21/23  Current: NA     3. Patient will increase limited knee left ROM by 10 degrees all planes so pt can  objects off the ground. Status at IE:   Knee Left Right   Extension 0 (pain) 0   Flexion 111 (AAROM), pain 130   Last PN goal MET 2/21/23  Knee Left Right   Extension 0 0   Flexion 125 AA no pain 130         4. Patient will improve FOTO score to 47 points to demonstrate improvement in functional status. Status at IE:37  Last PN 27 regression 2/21/23  Current: NA  *FOTO score is an established functional score where 100 = no disability*     5. Pt will be able to perform sit to stand transfer with SBAx1 and stand pivot transfer with min Ax1 so pt can assist with getting in and out of wheelchair  Status at IE: Stand Pivot to and from w/c and table, mod assistance x1 with quad cane, requiring assistance with left LE to step back to sit; Transfers: Sit to stand transfer from w/c to quad cane CGAx1; sit to stand transfer from table to quad cane with CGAx1, pt requires cuing with placement of left LE  Last PN 1/27/23 Stand Pivot to and from w/c and table, Contact gaurd assistance x1 with quad cane; Transfers: Sit to stand transfer from w/c to quad cane SBAx1; sit to stand transfer from table to quad cane with SBAx1, pt requires cuing with placement of left LE-goal MET     6. Pt will have 5 second improvement in 5x STS test and 3 second improvement in TUG so pt can perform ADLs.   Status at IE: 5x STS: From 20 inch table: 31.31 seconds, no use of the left LE, to quad cane, legs pressing against table; TUG: From 20\" table with quad cane, with min Ax1: 48.95 seconds  Last PN 1/27/23 5x STS:  From w/c to quad cane, using right UE: 1st time: 39.90 seconds, 2nd: time 24.18 seconds; TUG: From 20\" table with quad cane, with up to min Ax1: 35.70 seconds- goal met     New goals: 1/27/23 to be met in 8 more weeks   1) Pt will have an additional 5 second improvement in 5x STS test and 3 second improvement in TUG so pt can perform ADLs. Status at PN:  consistent cues needed to use left LE with standing with STS progressing 27 secs ( 10 sec improvement but continued assistance to steady balance in standing) 5x STS at 20\" table, min A. 2/17/23  Current: 29.5 sec from 18in with increased use of left LE today progressing 3/7/23     2)Pt will be able to ambulate for 1x250 feet with quad cane with SBA to CGA so that pt can ambulate around the home.    Status at PN  progressing, 3 trials with quad cane, mod A and cues for LE management, distances of 10ft and 60ft (2/17/23)  Current: NA     3) Pt will be able to stand without UE support for 2 minutes so that pt can perform ADLs  Status at PN:  progressing attempt #1 1min, #2 1min 54secs (2/17/23)  Current: NA         PLAN  Yes  Continue plan of care  []  Upgrade activities as tolerated  []  Discharge due to :  []  Other:    Kendy Calix PT    3/7/2023    7:21 AM    Future Appointments   Date Time Provider Vaishali Beckett   3/7/2023  7:30 AM PHILLIP Delarosa [facility]   3/9/2023  7:30 AM PHILLIP Byrd [facility]   3/14/2023  7:30 AM PHILLIP DelarosaHPISELA [facility]   3/16/2023  7:30 AM PHILLIP Byrd [facility]   3/21/2023  7:30 AM PHILLIP Delarosa [facility]   3/23/2023  7:30 AM PHILLIP Byrd [facility]   3/28/2023  7:30 AM PHILLIP Delarosa [facility]   3/30/2023  7:30 AM Leonard Armando, PHILLIP [facility]

## 2023-03-09 ENCOUNTER — APPOINTMENT (OUTPATIENT)
Facility: HOSPITAL | Age: 58
End: 2023-03-09
Payer: MEDICARE

## 2023-03-14 ENCOUNTER — HOSPITAL ENCOUNTER (OUTPATIENT)
Facility: HOSPITAL | Age: 58
Setting detail: RECURRING SERIES
Discharge: HOME | End: 2023-03-17
Payer: MEDICARE

## 2023-03-14 PROCEDURE — 97530 THERAPEUTIC ACTIVITIES: CPT

## 2023-03-14 PROCEDURE — 97112 NEUROMUSCULAR REEDUCATION: CPT

## 2023-03-14 NOTE — PROGRESS NOTES
PHYSICAL / OCCUPATIONAL THERAPY - DAILY TREATMENT NOTE (updated )    Patient Name: Elysia Chun    Date: 3/14/2023    : 1965  Insurance: Payor: Jed Youssef / Plan: FRANCO [facility] Brenna Sanches PLUS / Product Type: *No Product type* /      Patient  verified Yes     Visit #   Current / Total 10 27   Time   In / Out 730 805   Pain   In / Out 6 3   Subjective Functional Status/Changes: No falls. Changes to:  Meds, Allergies, Med Hx, Sx Hx? If yes, update Summary List no       TREATMENT AREA =  No admission diagnoses are documented for this encounter. OBJECTIVE      Therapeutic Procedures: Tx Min Billable or 1:1 Min (if diff from Tx Min) Procedure, Rationale, Specifics   20  [id number] Neuromuscular Re-Education (timed):  improve balance, coordination, kinesthetic sense, posture, core stability and proprioception to improve patient's ability to develop conscious control of individual muscles and awareness of position of extremities in order to progress to PLOF and address remaining functional goals. (see flow sheet as applicable)     Details if applicable:       15  [id number] Therapeutic Activity (timed):  use of dynamic activities replicating functional movements to increase ROM, strength, coordination, balance, and proprioception in order to improve patient's ability to progress to PLOF and address remaining functional goals.   (see flow sheet as applicable)     Details if applicable:            Details if applicable:            Details if applicable:            Details if applicable:     28  [facility] Totals Reminder: bill using total billable min of TIMED therapeutic procedures (example: do not include dry needle or estim unattended, both untimed codes, in totals to left)  8-22 min = 1 unit; 23-37 min = 2 units; 38-52 min = 3 units; 53-67 min = 4 units; 68-82 min = 5 units   Total Total     TOTAL TREATMENT TIME:        35     [x]  Patient Education billed concurrently with other procedures   [x] Review HEP    [] Progressed/Changed HEP, detail:    [] Other detail:       Objective Information/Functional Measures/Assessment  1 LOB walking to parallel bars with taking too big of step with right LE with max A from therapist to prevent falling  Improved use of left LE with STS at table  Unable to maintain full left extension in standing today    Pt tolerated session fairly today with increased fatigued noted with standing activities after STS today. Increased use of left LE with STS today with less VC however still needing cues to lean trunk forward. Pt reporting decreased pain in right hip after seated shifting activities. Patient will continue to benefit from skilled PT / OT services to modify and progress therapeutic interventions, analyze and address functional mobility deficits, analyze and address ROM deficits, analyze and address strength deficits, analyze and cue for proper movement patterns, analyze and modify for postural abnormalities, analyze and address imbalance/dizziness, and instruct in home and community integration to address functional deficits and attain remaining goals. Progress toward goals / Updated goals:  []  See Progress Note/Recertification    Short Term Goals: 1. Patient will report compliance with HEP 1x/day to aid in rehabilitation program.  Status at IE: Provided pt with HEP and pt appeared to understand. Last PN : compliance per pt report progressing well 2/21/23  Current: compliance per pt report progressing 3/14/23     2. Patient will rate pain on greater than 5/10 so pt can perform ADL's. Status at IE:Pt rates pain _6__/10 max (in the last 48 hrs) __4_/10 min (in the last 48 hrs) _5__/10 currently at rest  Last PN7/10 max pain in right hip, no longer having pain in left knee progressing 2/21/23  Current: 3/10 pain leaving today progressing 3/14/23     Long Term Goals: To be accomplished in 12 weeks: 1. Patient will increase limited Bilateral LE strength by at least . 5 grade MMT throughout so pt can perform transfers easier. Status at IE:    Left (0-5) Right (0-5)   Knee extension 3+ (pain) 4+   Knee flexion 2- 4   Ankle Dorsiflexion (L4) 0 1   Hip flexion 2- 3-   Hip abduction 1 2-      Last PN progressing 2/21/23    Left (0-5) Right (0-5)   Knee extension 4+ 4+   Knee flexion 3 4+   Ankle Dorsiflexion (L4) 0 1   Hip flexion 2 3   Hip abduction 2 2+      Current: NA     2. Patient will report pain no greater than 4/10 throughout entire day to aid in completion of ADLs. Status at IE:Pt rates pain _6__/10 max (in the last 48 hrs) __4_/10 min (in the last 48 hrs) _5__/10 currently at rest  Last PN : 7/10 max pain in right hip, no longer having pain in left knee progressing 2/21/23  Current: NA     3. Patient will increase limited knee left ROM by 10 degrees all planes so pt can  objects off the ground. Status at IE:   Knee Left Right   Extension 0 (pain) 0   Flexion 111 (AAROM), pain 130   Last PN goal MET 2/21/23  Knee Left Right   Extension 0 0   Flexion 125 AA no pain 130         4. Patient will improve FOTO score to 47 points to demonstrate improvement in functional status. Status at IE:37  Last PN 27 regression 2/21/23  Current: NA  *FOTO score is an established functional score where 100 = no disability*     5.  Pt will be able to perform sit to stand transfer with SBAx1 and stand pivot transfer with min Ax1 so pt can assist with getting in and out of wheelchair  Status at IE: Stand Pivot to and from w/c and table, mod assistance x1 with quad cane, requiring assistance with left LE to step back to sit; Transfers: Sit to stand transfer from w/c to quad cane CGAx1; sit to stand transfer from table to quad cane with CGAx1, pt requires cuing with placement of left LE  Last PN 1/27/23 Stand Pivot to and from w/c and table, Contact gaurd assistance x1 with quad cane; Transfers: Sit to stand transfer from w/c to quad cane SBAx1; sit to stand transfer from table to quad cane with SBAx1, pt requires cuing with placement of left LE-goal MET     6. Pt will have 5 second improvement in 5x STS test and 3 second improvement in TUG so pt can perform ADLs. Status at IE: 5x STS: From 20 inch table: 31.31 seconds, no use of the left LE, to quad cane, legs pressing against table; TUG: From 20\" table with quad cane, with min Ax1: 48.95 seconds  Last PN 1/27/23 5x STS:  From w/c to quad cane, using right UE: 1st time: 39.90 seconds, 2nd: time 24.18 seconds; TUG: From 20\" table with quad cane, with up to min Ax1: 35.70 seconds- goal met     New goals: 1/27/23 to be met in 8 more weeks   1) Pt will have an additional 5 second improvement in 5x STS test and 3 second improvement in TUG so pt can perform ADLs. Status at PN:  consistent cues needed to use left LE with standing with STS progressing 27 secs ( 10 sec improvement but continued assistance to steady balance in standing) 5x STS at 20\" table, min A. 2/17/23  Current: 29.5 sec from 18in with increased use of left LE today progressing 3/7/23     2)Pt will be able to ambulate for 1x250 feet with quad cane with SBA to CGA so that pt can ambulate around the home.    Status at PN  progressing, 3 trials with quad cane, mod A and cues for LE management, distances of 10ft and 60ft (2/17/23)  Current: NA     3) Pt will be able to stand without UE support for 2 minutes so that pt can perform ADLs  Status at PN:  progressing attempt #1 1min, #2 1min 54secs (2/17/23)  Current: NA         PLAN  Yes  Continue plan of care  []  Upgrade activities as tolerated  []  Discharge due to :  []  Other:    Chuck Pedraza PT    3/14/2023    7:27 AM    Future Appointments   Date Time Provider Vaishali Beckett   3/14/2023  7:30 AM PHILLIP Miles [facility]   3/21/2023  7:30 AM PHILLIP Miles [facility]   3/28/2023  7:30 AM Chuck Pedraza, PT [facility]

## 2023-03-16 ENCOUNTER — APPOINTMENT (OUTPATIENT)
Facility: HOSPITAL | Age: 58
End: 2023-03-16
Payer: MEDICARE

## 2023-03-21 ENCOUNTER — HOSPITAL ENCOUNTER (OUTPATIENT)
Facility: HOSPITAL | Age: 58
Setting detail: RECURRING SERIES
Discharge: HOME | End: 2023-03-24
Payer: MEDICARE

## 2023-03-21 PROCEDURE — 97110 THERAPEUTIC EXERCISES: CPT

## 2023-03-21 PROCEDURE — 97530 THERAPEUTIC ACTIVITIES: CPT

## 2023-03-21 PROCEDURE — 97116 GAIT TRAINING THERAPY: CPT

## 2023-03-21 NOTE — PROGRESS NOTES
[facility] at the [address], [place], [address] [facility]  Phone: [phone]      Fax:  [phone]    Continued Plan of Care/ Re-certification for Physical Therapy Services    Patient name: [place] Start of Care: 22   Referral source: Lauren De Paz : 1965   Medical/Treatment Diagnosis: left knee pain  M 25.562 Onset Date:1 month ago      Prior Hospitalization: see medical history Provider#: [id number]   Medications: Verified on Patient Summary List     Comorbidities: spina bifida, stroke, depression, arthritis, left ROSEANNE in , revision  and   Prior Level of Function:functionally required assistance with ADLs, required mod/max A with ambulation     Visits from Start of Care: 11    Missed Visits: 2    Reporting Period: 22 to 3/21/23    The Plan of Care and following information is based on the patient's current status:    Key functional changes: improvements in LE strength per MMT, no longer c/o left knee pain, left knee AROM WFL, increased use of left LE with transfers and standing with cues      Problems/ barriers to goal attainment: frequent cueing for left LE placement and safety awareness with transfers and gait, LE push off with STS, posterior LOB due to posterior trunk lean upon standing and walking      Problem List: pain affecting function, decrease ROM, decrease strength, impaired gait/balance, decrease ADL/functional abilities, decrease activity tolerance, decrease flexibility/joint mobility, and decrease transfer abilities     Treatment Plan: Therapeutic exercise, Neuromuscular reeducation, Manual therapy, Therapeutic activity, Self care/home management, and Gait training     Goals for this certification period to be accomplished in 8 weeks  Short Term Goals: 1. Patient will report compliance with HEP 1x/day to aid in rehabilitation program.  Status at IE: Provided pt with HEP and pt appeared to understand.   Last
functional deficits and attain remaining goals. Progress toward goals / Updated goals:  []  See Progress Note/Recertification    Short Term Goals: 1. Patient will report compliance with HEP 1x/day to aid in rehabilitation program.  Status at IE: Provided pt with HEP and pt appeared to understand. Last PN : compliance per pt report progressing well 2/21/23  Current: compliance per pt report goal MET 3/21/23     2. Patient will rate pain on greater than 5/10 so pt can perform ADL's. Status at IE:Pt rates pain _6__/10 max (in the last 48 hrs) __4_/10 min (in the last 48 hrs) _5__/10 currently at rest  Last PN7/10 max pain in right hip, no longer having pain in left knee progressing 2/21/23  Current: 6/10 max pain in right hip progressing 3/21/23     Long Term Goals: To be accomplished in 12 weeks: 1. Patient will increase limited Bilateral LE strength by at least . 5 grade MMT throughout so pt can perform transfers easier. Status at IE:    Left (0-5) Right (0-5)   Knee extension 3+ (pain) 4+   Knee flexion 2- 4   Ankle Dorsiflexion (L4) 0 1   Hip flexion 2- 3-   Hip abduction 1 2-      Last PN progressing 2/21/23    Left (0-5) Right (0-5)   Knee extension 4+ 4+   Knee flexion 3 4+   Ankle Dorsiflexion (L4) 0 1   Hip flexion 2 3   Hip abduction 2 2+      Current: progressing 3/21/23    Left (0-5) Right (0-5)   Knee extension 4 4+   Knee flexion 3 5   Ankle Dorsiflexion (L4) 0 1   Hip flexion 2+ 4   Hip abduction 2 2+        2. Patient will report pain no greater than 4/10 throughout entire day to aid in completion of ADLs. Status at IE:Pt rates pain _6__/10 max (in the last 48 hrs) __4_/10 min (in the last 48 hrs) _5__/10 currently at rest  Last PN : 7/10 max pain in right hip, no longer having pain in left knee progressing 2/21/23  Current: 6/10 max pain in right hip progressing 3/21/23     3. Patient will increase limited knee left ROM by 10 degrees all planes so pt can  objects off the ground.   Status

## 2023-03-23 ENCOUNTER — APPOINTMENT (OUTPATIENT)
Facility: HOSPITAL | Age: 58
End: 2023-03-23
Payer: MEDICARE

## 2023-03-28 ENCOUNTER — HOSPITAL ENCOUNTER (OUTPATIENT)
Facility: HOSPITAL | Age: 58
Setting detail: RECURRING SERIES
Discharge: HOME | End: 2023-03-31
Payer: MEDICARE

## 2023-03-28 PROCEDURE — 97116 GAIT TRAINING THERAPY: CPT

## 2023-03-28 PROCEDURE — 97112 NEUROMUSCULAR REEDUCATION: CPT

## 2023-03-30 ENCOUNTER — APPOINTMENT (OUTPATIENT)
Facility: HOSPITAL | Age: 58
End: 2023-03-30
Payer: MEDICARE

## 2023-04-18 ENCOUNTER — HOSPITAL ENCOUNTER (OUTPATIENT)
Facility: HOSPITAL | Age: 58
Setting detail: RECURRING SERIES
Discharge: HOME | End: 2023-04-21
Payer: MEDICARE

## 2023-04-18 PROCEDURE — 97530 THERAPEUTIC ACTIVITIES: CPT

## 2023-04-18 PROCEDURE — 97110 THERAPEUTIC EXERCISES: CPT

## 2023-04-18 PROCEDURE — 97112 NEUROMUSCULAR REEDUCATION: CPT

## 2023-04-18 NOTE — PROGRESS NOTES
seconds  Last PN 1/27/23 5x STS:  From w/c to quad cane, using right UE: 1st time: 39.90 seconds, 2nd: time 24.18 seconds; TUG: From 20\" table with quad cane, with up to min Ax1: 35.70 seconds- goal met     New goals: 1/27/23 to be met in 8 more weeks   1) Pt will have an additional 5 second improvement in 5x STS test and 3 second improvement in TUG so pt can perform ADLs. Status at PN: 32.9 sec from 18in with cues to lean forward, LE push off table with 5x STS and TUG 1min 15 sec progressing 3/21/23  Current: 5x STS in 31.9 sec from 18in with SBA, pushing off from LE from table, attempted TUG however LOB with turn needing Max A to correct regression 4/18/23     2)Pt will be able to ambulate for 1x250 feet with quad cane with SBA to CGA so that pt can ambulate around the home. Status at PN  progressing, 3 trials with quad cane, mod A and cues for LE management, distances of 10ft and 60ft (2/17/23)  Last PN 50ft with CGA to min A due to LOB progressing 3/21/23  Current: LOB with max A to correct with completing TUG today regression 4/18/23     3) Pt will be able to stand without UE support for 2 minutes so that pt can perform ADLs  Status at PN:  progressing attempt #1 1min, #2 1min 54secs (2/17/23)  Last PN:38sec first trial, 44 seconds second trial, posterior LOB noted needing cues to increase anterior trunk lean regression from last PN 3/21/23  Current: unable stand without UE support today regression 4/18/23         Summary of Care/ Key Functional Changes: Pt presented to therapy with c/c left knee and right hip pain with diagnosis or left closed tibial fx. Pt with a history of Spina bifida, Stroke when she was 7, arthritis, Left ROSEANNE in 2007, revision 2009, and another revision in 2011. Pt has not been in seen in 1 month due to insurance and scheduling.  Pt has attended 13 sessions including initial eval with making progress towards goals however regression noted today possibly due to not being in therapy in a
ADLs.  Status at PN: 32.9 sec from 18in with cues to lean forward, LE push off table with 5x STS and TUG 1min 15 sec progressing 3/21/23  Current: 5x STS in 31.9 sec from 18in with SBA, pushing off from LE from table, attempted TUG however LOB with turn needing Max A to correct regression 4/18/23     2)Pt will be able to ambulate for 1x250 feet with quad cane with SBA to CGA so that pt can ambulate around the home. Status at PN  progressing, 3 trials with quad cane, mod A and cues for LE management, distances of 10ft and 60ft (2/17/23)  Last PN 50ft with CGA to min A due to LOB progressing 3/21/23  Current: LOB with max A to correct with completing TUG today regression 4/18/23     3) Pt will be able to stand without UE support for 2 minutes so that pt can perform ADLs  Status at PN:  progressing attempt #1 1min, #2 1min 54secs (2/17/23)  Last PN:38sec first trial, 44 seconds second trial, posterior LOB noted needing cues to increase anterior trunk lean regression from last PN 3/21/23  Current: unable stand without UE support today regression 4/18/23      PLAN  Yes  Continue plan of care  []  Upgrade activities as tolerated  []  Discharge due to :  []  Other:    Allyn Love, PT    4/18/2023    8:22 AM    No future appointments.

## 2023-05-09 ENCOUNTER — HOSPITAL ENCOUNTER (OUTPATIENT)
Facility: HOSPITAL | Age: 58
Setting detail: RECURRING SERIES
Discharge: HOME | End: 2023-05-12
Payer: MEDICARE

## 2023-05-09 PROCEDURE — 97530 THERAPEUTIC ACTIVITIES: CPT

## 2023-05-09 PROCEDURE — 97116 GAIT TRAINING THERAPY: CPT

## 2023-05-09 PROCEDURE — 97110 THERAPEUTIC EXERCISES: CPT

## 2023-05-09 NOTE — PROGRESS NOTES
PHYSICAL / OCCUPATIONAL THERAPY - DAILY TREATMENT NOTE (updated )    Patient Name: Prashanth Herron    Date: 2023    : 1965  Insurance: Payor: [employer]Bit Rodriguez / Plan: [employer] [facility] [employer] PLUS / Product Type: *No Product type* /      Patient  verified Yes     Visit #   Current / Total 14 27   Time   In / Out 720 [id number]   Pain   In / Out 5 3   Subjective Functional Status/Changes: Doing good. Pain in left hip. No knee pain. No falls since last visit. Changes to:  Meds, Allergies, Med Hx, Sx Hx? If yes, update Summary List no       TREATMENT AREA =  No admission diagnoses are documented for this encounter. OBJECTIVE          Therapeutic Procedures: Tx Min Billable or 1:1 Min (if diff from Tx Min) Procedure, Rationale, Specifics   15  41435 Therapeutic Exercise (timed):  increase ROM, strength, coordination, balance, and proprioception to improve patient's ability to progress to PLOF and address remaining functional goals. (see flow sheet as applicable)     Details if applicable:       15  28893 Therapeutic Activity (timed):  use of dynamic activities replicating functional movements to increase ROM, strength, coordination, balance, and proprioception in order to improve patient's ability to progress to PLOF and address remaining functional goals. (see flow sheet as applicable)     Details if applicable: focus on transfers    13  17322 Gait Training (timed):    8 x4 feet with parallel bars (assistive device) over flat  surfaces with mod level of assist. Cuing for foot placement, proper WS to left leg. To improve safety and dynamic movement with household/community ambulation.   (see flow sheet as applicable)     Details if applicable: also worked on [employer] activities with improved knee Ext and hip stability left           Details if applicable:            Details if applicable:     37  [address] Reminder: bill using total billable min of TIMED therapeutic procedures (example: do not

## 2023-05-17 ENCOUNTER — HOSPITAL ENCOUNTER (OUTPATIENT)
Facility: HOSPITAL | Age: 58
Setting detail: RECURRING SERIES
Discharge: HOME | End: 2023-05-20
Payer: MEDICARE

## 2023-05-17 PROCEDURE — 97110 THERAPEUTIC EXERCISES: CPT

## 2023-05-17 PROCEDURE — 97530 THERAPEUTIC ACTIVITIES: CPT

## 2023-05-17 PROCEDURE — 97112 NEUROMUSCULAR REEDUCATION: CPT

## 2023-05-17 NOTE — PROGRESS NOTES
PHYSICAL / OCCUPATIONAL THERAPY - DAILY TREATMENT NOTE (updated )    Patient Name: Robert Going    Date: 2023    : 1965  Insurance: Payor: Tomasa Roe / Plan: [employer]AN[employer] [facility] Joanie Méndez PLUS / Product Type: *No Product type* /      Patient  verified Yes     Visit #   Current / Total 15 27   Time   In / Out [phone]   Pain   In / Out 8 7   Subjective Functional Status/Changes: Reports fall last Thursday at home. Visit to ER. Diagnosed with leg contusion. Since then left thigh pain and intermittent giving out of left knee. Changes to:  Meds, Allergies, Med Hx, Sx Hx? If yes, update Summary List no       TREATMENT AREA =  No admission diagnoses are documented for this encounter. OBJECTIVE      Therapeutic Procedures: Tx Min Billable or 1:1 Min (if diff from Tx Min) Procedure, Rationale, Specifics   15 11 [id number] Therapeutic Exercise (timed):  increase ROM, strength, coordination, balance, and proprioception to improve patient's ability to progress to PLOF and address remaining functional goals. (see flow sheet as applicable)     Details if applicable: assisted left leg ROM in supine      15  85792 Neuromuscular Re-Education (timed):  improve balance, coordination, kinesthetic sense, posture, core stability and proprioception to improve patient's ability to develop conscious control of individual muscles and awareness of position of extremities in order to progress to PLOF and address remaining functional goals. (see flow sheet as applicable)     Details if applicable:     14  05909 Therapeutic Activity (timed):  use of dynamic activities replicating functional movements to increase ROM, strength, coordination, balance, and proprioception in order to improve patient's ability to progress to PLOF and address remaining functional goals.   (see flow sheet as applicable)     Details if applicable: 5xSTS, reevaluation, transfers bed and STS, gait with SC              44 40 MC BC Totals
continue therapy with the following changes/special instructions: _______________________________________  [] I have read the above report and request that my patient be discharged from therapy    Physician's Signature:____________________ Date:_________ TIME:________    Muriel Alpers, Date and Time must be completed for valid certification **    Please sign and return to   [facility] at the [address], [facility], [address] [address]  Phone: [phone]      Fax:  [phone]

## 2023-05-24 ENCOUNTER — HOSPITAL ENCOUNTER (OUTPATIENT)
Facility: HOSPITAL | Age: 58
Setting detail: RECURRING SERIES
Discharge: HOME | End: 2023-05-27
Payer: MEDICARE

## 2023-05-24 PROCEDURE — 97530 THERAPEUTIC ACTIVITIES: CPT

## 2023-05-24 PROCEDURE — 97112 NEUROMUSCULAR REEDUCATION: CPT

## 2023-05-24 PROCEDURE — 97110 THERAPEUTIC EXERCISES: CPT

## 2023-05-24 NOTE — PROGRESS NOTES
Transfers: Sit to stand transfer from w/c to quad cane SBAx1; sit to stand transfer from table to quad cane with SBAx1, pt requires cuing with placement of left LE-goal MET     6. Pt will have 5 second improvement in 5x STS test and 3 second improvement in TUG so pt can perform ADLs. Status at IE: 5x STS: From 20 inch table: 31.31 seconds, no use of the left LE, to quad cane, legs pressing against table; TUG: From 20\" table with quad cane, with min Ax1: 48.95 seconds  Last PN 1/27/23 5x STS:  From w/c to quad cane, using right UE: 1st time: 39.90 seconds, 2nd: time 24.18 seconds; TUG: From 20\" table with quad cane, with up to min Ax1: 35.70 seconds- goal met     New goals: 1/27/23 to be met in 8 more weeks   1) Pt will have an additional 5 second improvement in 5x STS test and 3 second improvement in TUG so pt can perform ADLs. Status at PN: 32.9 sec from 18in with cues to lean forward, LE push off table with 5x STS and TUG 1min 15 sec progressing 3/21/23  Last PN: 5x STS in 31.9 sec from 18in with SBA, pushing off from LE from table, attempted TUG however LOB with turn needing Max A to correct regression 4/18/23  Current 5/17/23: performed 5xSTS in 26.86 sec, with patient using right side for compensation, MET     2)Pt will be able to ambulate for 1x250 feet with quad cane with SBA to CGA so that pt can ambulate around the home.    Status at PN  progressing, 3 trials with quad cane, mod A and cues for LE management, distances of 10ft and 60ft (2/17/23)  Last PN 50ft with CGA to min A due to LOB progressing 3/21/23  Status on 5/9/23:gait in parallel bars fwd/retro for 8 ft x 2 with mod assist and VC for foot placement and increased left WS, progressing  Current 5/17/23: ambulation with QC and mod assist for safety for 45 ft, signs of instability, progressing     3) Pt will be able to stand without UE support for 2 minutes so that pt can perform ADLs  Status at PN:  progressing attempt #1 1min, #2 1min 54secs

## 2023-05-26 ENCOUNTER — HOSPITAL ENCOUNTER (OUTPATIENT)
Facility: HOSPITAL | Age: 58
Setting detail: RECURRING SERIES
Discharge: HOME | End: 2023-05-29
Payer: MEDICARE

## 2023-05-26 PROCEDURE — 97110 THERAPEUTIC EXERCISES: CPT

## 2023-05-26 PROCEDURE — 97112 NEUROMUSCULAR REEDUCATION: CPT

## 2023-05-26 PROCEDURE — 97116 GAIT TRAINING THERAPY: CPT

## 2023-05-31 ENCOUNTER — APPOINTMENT (OUTPATIENT)
Facility: HOSPITAL | Age: 58
End: 2023-05-31
Payer: MEDICARE

## 2023-06-01 ENCOUNTER — HOSPITAL ENCOUNTER (OUTPATIENT)
Facility: HOSPITAL | Age: 58
Setting detail: RECURRING SERIES
Discharge: HOME | End: 2023-06-04
Payer: MEDICARE

## 2023-06-01 PROCEDURE — 97116 GAIT TRAINING THERAPY: CPT

## 2023-06-01 PROCEDURE — 97110 THERAPEUTIC EXERCISES: CPT

## 2023-06-01 PROCEDURE — 97530 THERAPEUTIC ACTIVITIES: CPT

## 2023-06-15 ENCOUNTER — APPOINTMENT (OUTPATIENT)
Facility: HOSPITAL | Age: 58
End: 2023-06-15
Payer: MEDICARE

## 2024-10-17 ENCOUNTER — HOSPITAL ENCOUNTER (OUTPATIENT)
Facility: HOSPITAL | Age: 59
Discharge: HOME | End: 2024-10-20

## 2024-10-17 LAB — LABCORP SPECIMEN COLLECTION: NORMAL

## 2024-10-17 PROCEDURE — 99001 SPECIMEN HANDLING PT-LAB: CPT

## 2025-01-14 ENCOUNTER — HOSPITAL ENCOUNTER (OUTPATIENT)
Facility: HOSPITAL | Age: 60
Setting detail: SPECIMEN
Discharge: HOME | End: 2025-01-17

## 2025-01-14 LAB — LABCORP SPECIMEN COLLECTION: NORMAL

## 2025-01-14 PROCEDURE — 99001 SPECIMEN HANDLING PT-LAB: CPT
